# Patient Record
Sex: FEMALE | Race: WHITE | NOT HISPANIC OR LATINO | Employment: OTHER | URBAN - METROPOLITAN AREA
[De-identification: names, ages, dates, MRNs, and addresses within clinical notes are randomized per-mention and may not be internally consistent; named-entity substitution may affect disease eponyms.]

---

## 2018-04-09 LAB — INR PPP: 2.6 (ref 2–3.5)

## 2018-04-23 ENCOUNTER — ANTICOAGULATION VISIT (OUTPATIENT)
Dept: MEDICAL GROUP | Facility: PHYSICIAN GROUP | Age: 83
End: 2018-04-23
Payer: MEDICARE

## 2018-04-23 ENCOUNTER — TELEPHONE (OUTPATIENT)
Dept: VASCULAR LAB | Facility: MEDICAL CENTER | Age: 83
End: 2018-04-23

## 2018-04-23 DIAGNOSIS — Z79.01 CHRONIC ANTICOAGULATION: ICD-10-CM

## 2018-04-23 LAB — INR PPP: 1.5 (ref 2–3.5)

## 2018-04-23 PROCEDURE — 99211 OFF/OP EST MAY X REQ PHY/QHP: CPT | Performed by: FAMILY MEDICINE

## 2018-04-23 PROCEDURE — 85610 PROTHROMBIN TIME: CPT | Performed by: FAMILY MEDICINE

## 2018-04-23 RX ORDER — WARFARIN SODIUM 4 MG/1
4-8 TABLET ORAL DAILY
Qty: 180 TAB | Refills: 1 | Status: SHIPPED | OUTPATIENT
Start: 2018-04-23 | End: 2018-05-24 | Stop reason: SDUPTHER

## 2018-04-23 NOTE — PROGRESS NOTES
Anticoagulation Summary  As of 4/23/2018    INR goal:   2.0-3.0   TTR:   --   Today's INR:   1.5!   Maintenance plan:   4 mg (4 mg x 1) every day   Weekly total:   28 mg   Plan last modified:   Harrison Adames, PharmD (4/23/2018)   Next INR check:   4/26/2018   Target end date:       Indications    Acute thromboembolism of deep veins of lower extremity (CMS-HCC) [I82.409]  Pulmonary embolism (CMS-HCC) [I26.99]  Chronic anticoagulation [Z79.01]             Anticoagulation Episode Summary     INR check location:   [facility]    Preferred lab:       Send INR reminders to:       Comments:         Anticoagulation Care Providers     Provider Role Specialty Phone number    [facility] Responsible  [phone]    Rambo Shah M.D. Responsible Family Medicine [phone]        Anticoagulation Patient Findings      HPI:  Nancy King referred to the [facility] for warfarin management. They have been with us in the past. They have a PMH of a PE and DVT. Note from Dr. Bloch on 7/15.  They have been managed by oncology. She has had about 2-3 episodes of PEs. She and her  have a Alere HM that they bought and will continue to use it and report the INR to us.      Pt gave verbal consent  to leave a VM with detailed medical information regarding INR and dose of warfarin.    Pt education done (s/s of bleeding, when to f/u with clinic vs ER, foods with vitamin K, etc)     Assessment:  INR  sub-therapeutic.      She does not want to use lovenox at this time.     They would be a good candidate for PSM at some point, but I would like to work with them first on the phone for a few weeks first.     Pt tolerating medication well, no s/s of bleeding.     Med rec done with pt, they are also on fish oil and a SSRI, both will increase the bleeding risk. The risks discussed with the patient.     Plan   2 tabs today then continue weekly warfarin dose as noted      Follow up:  Follow up appointment in 3 days        Harrison Adames, PharmD

## 2018-04-24 NOTE — TELEPHONE ENCOUNTER
Initial anticoag clinic note reviewed.  Pending further recs from pcp or heme we will continue with indefinite anticoag for recurrent DVT/PE.  Patient also had IVC filter in past - likely remains indwelling based on previous documentation    Michael Bloch, MD  Anticoagulation Clinic    Cc:      Dr. Alyssa Neil

## 2018-04-25 ENCOUNTER — ANTICOAGULATION MONITORING (OUTPATIENT)
Dept: VASCULAR LAB | Facility: MEDICAL CENTER | Age: 83
End: 2018-04-25

## 2018-04-25 DIAGNOSIS — Z79.01 CHRONIC ANTICOAGULATION: ICD-10-CM

## 2018-04-25 LAB — INR PPP: 2.3 (ref 2–3.5)

## 2018-04-25 NOTE — PROGRESS NOTES
Anticoagulation Summary  As of 4/25/2018    INR goal:   2.0-3.0   TTR:   --   Today's INR:   2.3   Warfarin maintenance plan:   4 mg (4 mg x 1) every day   Weekly warfarin total:   28 mg   Plan last modified:   Harrison Adames, PharmD (4/23/2018)   Next INR check:   5/2/2018   Target end date:   Indefinite    Indications    Acute thromboembolism of deep veins of lower extremity (CMS-[facility]) [I82.409]  Pulmonary embolism (CMS-[facility]) [I26.99]  Chronic anticoagulation [Z79.01]             Anticoagulation Episode Summary     INR check location:   [facility]    Preferred lab:       Send INR reminders to:       Comments:         Anticoagulation Care Providers     Provider Role Specialty Phone number    [facility] Responsible  [phone]    Rambo Shah M.D. Responsible [facility] [phone]        Anticoagulation Patient Findings      Spoke with pt's .  INR is therapeutic.   However pt missed her dose yesterday.   Pt denies any unusual s/s of bleeding, bruising, clotting or any changes to diet or medications. Denies any etoh, cranberries, supplements, or illness.   Pt verifies warfarin weekly dosing.     Will have pt take a boost dose of 6mg x1 today and then resume her normal dose of 4mg daily.     Repeat INR in 1 week(s).     Tete Smith, PharmD

## 2018-05-18 LAB — INR PPP: 3.2 (ref 2–3.5)

## 2018-05-21 ENCOUNTER — ANTICOAGULATION MONITORING (OUTPATIENT)
Dept: VASCULAR LAB | Facility: MEDICAL CENTER | Age: 83
End: 2018-05-21

## 2018-05-21 DIAGNOSIS — Z79.01 CHRONIC ANTICOAGULATION: ICD-10-CM

## 2018-05-24 ENCOUNTER — OFFICE VISIT (OUTPATIENT)
Dept: CARDIOLOGY | Facility: MEDICAL CENTER | Age: 83
End: 2018-05-24
Payer: MEDICARE

## 2018-05-24 VITALS
WEIGHT: 184 LBS | BODY MASS INDEX: 30.66 KG/M2 | DIASTOLIC BLOOD PRESSURE: 78 MMHG | HEART RATE: 96 BPM | OXYGEN SATURATION: 97 % | HEIGHT: 65 IN | SYSTOLIC BLOOD PRESSURE: 152 MMHG

## 2018-05-24 DIAGNOSIS — I10 BENIGN ESSENTIAL HTN: Chronic | ICD-10-CM

## 2018-05-24 DIAGNOSIS — Z79.01 CHRONIC ANTICOAGULATION: Chronic | ICD-10-CM

## 2018-05-24 DIAGNOSIS — I82.409 ACUTE THROMBOEMBOLISM OF DEEP VEINS OF LOWER EXTREMITY, UNSPECIFIED LATERALITY (HCC): Chronic | ICD-10-CM

## 2018-05-24 DIAGNOSIS — R07.89 OTHER CHEST PAIN: ICD-10-CM

## 2018-05-24 DIAGNOSIS — I25.10 CORONARY ARTERY DISEASE INVOLVING NATIVE CORONARY ARTERY OF NATIVE HEART WITHOUT ANGINA PECTORIS: Chronic | ICD-10-CM

## 2018-05-24 DIAGNOSIS — E78.00 PURE HYPERCHOLESTEROLEMIA: Chronic | ICD-10-CM

## 2018-05-24 DIAGNOSIS — I60.9 SUBARACHNOID HEMORRHAGE (HCC): ICD-10-CM

## 2018-05-24 LAB — EKG IMPRESSION: NORMAL

## 2018-05-24 PROCEDURE — 99204 OFFICE O/P NEW MOD 45 MIN: CPT | Performed by: INTERNAL MEDICINE

## 2018-05-24 PROCEDURE — 93000 ELECTROCARDIOGRAM COMPLETE: CPT | Performed by: INTERNAL MEDICINE

## 2018-05-24 RX ORDER — NITROGLYCERIN 0.4 MG/1
0.4 TABLET SUBLINGUAL PRN
Qty: 25 TAB | Refills: 3 | Status: SHIPPED | OUTPATIENT
Start: 2018-05-24 | End: 2019-10-07 | Stop reason: SDUPTHER

## 2018-05-24 RX ORDER — AZITHROMYCIN 250 MG/1
TABLET, FILM COATED ORAL
COMMUNITY
Start: 2018-04-29 | End: 2018-05-24

## 2018-05-24 RX ORDER — SIMVASTATIN 40 MG
40 TABLET ORAL EVERY MORNING
Qty: 90 TAB | Refills: 3 | Status: SHIPPED | OUTPATIENT
Start: 2018-05-24 | End: 2019-02-23 | Stop reason: SDUPTHER

## 2018-05-24 RX ORDER — VALSARTAN 80 MG/1
80 TABLET ORAL DAILY
Qty: 90 TAB | Refills: 3 | Status: SHIPPED | OUTPATIENT
Start: 2018-05-24 | End: 2018-08-20 | Stop reason: RX

## 2018-05-24 RX ORDER — WARFARIN SODIUM 4 MG/1
4-8 TABLET ORAL DAILY
Qty: 180 TAB | Refills: 3 | Status: SHIPPED | OUTPATIENT
Start: 2018-05-24 | End: 2020-09-08

## 2018-05-24 RX ORDER — VALSARTAN 80 MG/1
80 TABLET ORAL DAILY
COMMUNITY
End: 2018-05-24 | Stop reason: SDUPTHER

## 2018-05-24 ASSESSMENT — ENCOUNTER SYMPTOMS
ORTHOPNEA: 0
NERVOUS/ANXIOUS: 1
PND: 0
SHORTNESS OF BREATH: 1
FEVER: 0
INSOMNIA: 0
MYALGIAS: 0
BLURRED VISION: 0
PALPITATIONS: 0
CHILLS: 0
BACK PAIN: 1
ABDOMINAL PAIN: 0
LOSS OF CONSCIOUSNESS: 0
HEARTBURN: 1
DIZZINESS: 0

## 2018-05-24 NOTE — PROGRESS NOTES
"Chief Complaint   Patient presents with   • Chest Pain     x2 weeks scale:7       Subjective:   Nancy King is a 82 y.o. female who presents today as new patient establishment for chest pain with coronary artery disease.    Ms. Moffett is a 82 year old female with history of coronary artery disease, hypertension and hyperlipidemia, deep venous thrombosis and.pulmonary embolism on chronic anticoagulation therapy. She was well until 2 weeks ago when she began to experience chest pain. She describes her chest pain to be moderate to severe chest pressure sensation of her mid chest with radiation up her shoulder and down her bilateral arm, lasting up one to five minutes. She admits to associated nausea. She denies associated shortness of breath, palpitations, diaphoresis and vomiting. Her pain occurs couple of hours after she falls asleep and is alleviated by use of nitroglycerine.     Past Medical History:   Diagnosis Date   • Acute venous embolism and thrombosis of unspecified deep vessels of lower extremity    • Angina pectoris 5/11/2012    \"with stress\"   • Benign essential HTN 5/11/2012   • CAD (coronary artery disease) 5/11/2012   • Cancer (HCC)     right breast   • Chronic anticoagulation 5/11/2012   • Heart burn    • Hyperlipidemia 5/11/2012   • Hyperlipoproteinemia 5/11/2012   • Indigestion    • Obesity 5/11/2012   • Other specified disorder of intestines     diarrhea   • Pulmonary embolism (HCC)    • Unspecified cataract     removed bilat   • Unspecified disorder of thyroid    • Unspecified urinary incontinence      Past Surgical History:   Procedure Laterality Date   • MASTECTOMY  1/12/2015    Performed by Dwight Birch M.D. at SURGERY [facility]   • NODE BIOPSY SENTINEL  1/12/2015    Performed by Dwight Birch M.D. at SURGERY [facility]   • AXILLARY NODE DISSECTION  1/12/2015    Performed by Dwight Birch M.D. at SURGERY [facility]   • APPENDECTOMY     • CHOLECYSTECTOMY     • " HYSTERECTOMY, TOTAL ABDOMINAL     • OTHER      Bladder   • TONSILLECTOMY AND ADENOIDECTOMY       Family History   Problem Relation Age of Onset   • Heart Disease Mother    • Heart Disease Father      Social History     Social History   • Marital status:      Spouse name: N/A   • Number of children: N/A   • Years of education: N/A     Occupational History   • Not on file.     Social History Main Topics   • Smoking status: Never Smoker   • Smokeless tobacco: Never Used   • Alcohol use No   • Drug use: No   • Sexual activity: Not on file     Other Topics Concern   • Not on file     Social History Narrative   • No narrative on file     Allergies   Allergen Reactions   • Sulfa Drugs      Swelling   • Levaquin      Rash     Medications reviewed.    Outpatient Encounter Prescriptions as of 5/24/2018   Medication Sig Dispense Refill   • Capsaicin-Menthol (PAIN RELIEF EX) by Apply externally route.     • valsartan (DIOVAN) 80 MG Tab Take 80 mg by mouth every day.     • SITagliptin (JANUVIA) 100 MG Tab Take 100 mg by mouth every day.     • warfarin (COUMADIN) 4 MG Tab Take 1-2 Tabs by mouth every day. 180 Tab 1   • levothyroxine (SYNTHROID) 50 MCG TABS Take 50 mcg by mouth every morning before breakfast.     • simvastatin (ZOCOR) 40 MG TABS Take 40 mg by mouth every morning.     • Cholecalciferol (VITAMIN D PO) Take 1 Tab by mouth every morning.     • ascorbic acid (ASCORBIC ACID) 500 MG TABS Take 500 mg by mouth every morning.     • [DISCONTINUED] azithromycin (ZITHROMAX) 250 MG Tab      • [DISCONTINUED] enoxaparin (LOVENOX) 100 MG/ML SOLN inj Inject 80 mg as instructed every 12 hours. 7 Syringe 0   • [DISCONTINUED] lisinopril (PRINIVIL) 20 MG TABS Take 20 mg by mouth every day.     • [DISCONTINUED] metoprolol SR (TOPROL XL) 25 MG TB24 Take 25 mg by mouth every day.     • [DISCONTINUED] magnesium oxide (MAG-OX) 400 MG TABS Take 400 mg by mouth every morning.     • [DISCONTINUED] Coenzyme Q10 200 MG CAPS Take 200 mg by  "mouth every day.     • [DISCONTINUED] Omega-3 Fatty Acids (OMEGA 3 PO) Take 1 Cap by mouth every morning.     • [DISCONTINUED] Green Tea, Camillia sinensis, (GREEN TEA EXTRACT) 150 MG CAPS Take 1 Cap by mouth every morning.     • [DISCONTINUED] hydrochlorothiazide (MICROZIDE) 12.5 MG capsule Take 1 Cap by mouth every day. 90 Cap 1   • [DISCONTINUED] escitalopram (LEXAPRO) 10 MG TABS Take 10 mg by mouth every morning.     • [DISCONTINUED] warfarin (COUMADIN) 3 MG TABS Take 3 mg by mouth every 48 hours. Alternates w/4 mg       No facility-administered encounter medications on file as of 5/24/2018.      Review of Systems   Constitutional: Negative for chills and fever.   HENT: Positive for hearing loss. Negative for congestion.    Eyes: Negative for blurred vision.   Respiratory: Positive for shortness of breath.    Cardiovascular: Positive for chest pain and leg swelling. Negative for palpitations, orthopnea and PND.   Gastrointestinal: Positive for heartburn. Negative for abdominal pain.   Genitourinary: Negative for dysuria.   Musculoskeletal: Positive for back pain and joint pain. Negative for myalgias.   Skin: Positive for itching. Negative for rash.   Neurological: Negative for dizziness and loss of consciousness.   Psychiatric/Behavioral: The patient is nervous/anxious. The patient does not have insomnia.         Objective:   /78   Pulse 96   Ht 1.651 m (5' 5\")   Wt 83.5 kg (184 lb)   SpO2 97%   BMI 30.62 kg/m²     Physical Exam   Constitutional: She is oriented to person, place, and time. She appears well-developed and well-nourished.   HENT:   Head: Normocephalic and atraumatic.   Eyes: Conjunctivae are normal.   Neck: Neck supple.   Cardiovascular: Normal rate and regular rhythm.    Pulmonary/Chest: Effort normal and breath sounds normal.   Abdominal: Soft. Bowel sounds are normal.   Musculoskeletal: Normal range of motion. She exhibits edema.   Neurological: She is alert and oriented to person, " place, and time.   Skin: Skin is warm and dry.   Psychiatric: She has a normal mood and affect.     CARDIAC STUDIES/PROCEDURES:    CTA OF CHEST (01/30/15)  1.  Extensive pulmonary thromboembolism involving majority of right pulmonary arteries and significant portions of left pulmonary arteries  2.  No other significant finding  3.  Right breast postoperative changes  4.  Aortic atherosclerotic plaque  5.  Infiltration of liver    ECHOCARDIOGRAM CONCLUSIONS (02/01/15)  No prior study is available for comparison.   Normal left ventricular size and function. Grade I diastolic   dysfunction - mitral inflow E/A is <1.0. Left ventricular ejection   fraction is 60% to 65%. Mild concentric left ventricular hypertrophy.  No significant valve abnormalities.   Right ventricular systolic pressure is estimated to be 45-50 mmHg.  Normal pericardium without effusion.    EKG was ordered for chest pain, performed on (05/24/18) and reviewed: EKG shows sinus rhythm with diffuse ST abnormalities.    Laboratory results of (02/05/15) were reviewed. Cholesterol profile of 129/144/34/66 noted.    LOWER EXTREMITY VENOUS ULTRASOUND (01/30/15)  1.  Normal right lower extremity superficial and deep venous examination.   2.  Left lower extremity deep and superficial venous thrombosis.   3.  Left popliteal cyst.    Assessment:     1. Other chest pain  EKG   2. Coronary artery disease involving native coronary artery of native heart without angina pectoris     3. Benign essential HTN     4. Pure hypercholesterolemia     5. Acute thromboembolism of deep veins of lower extremity, unspecified laterality (HCC)     6. Chronic anticoagulation     7. Subarachnoid hemorrhage (HCC)       Medical Decision Making:  Today's Assessment / Status / Plan:     1. Chest pain with coronary artery disease of left anterior descending artery diagnosed in 1984 [place], [place]: She is experiencing chest pain as described above. We will perform a MPI study and an  echocardiogram and follow up with her. We will start beta-blockade therapy with metoprolol 25 mg BID and she will try PPI as well.   2. Hypertension: Blood pressure has been high. She has been off of her Diovan. We will restart her medication.  3. Hyperlipidemia: She is doing well on statin therapy without myalgia symptoms. We will repeat labs including fasting lipid profile.  4. Pulmonary embolism with deep venous thrombosis on chronic anticoagulation therapy (warfarin): She is clinically doing well without recurrence or chest pain or shortness of breath. She has chronic edema of her left lower extremity.    We will follow up after her tests to reassess her symptoms.

## 2018-06-11 ENCOUNTER — HOSPITAL ENCOUNTER (OUTPATIENT)
Dept: CARDIOLOGY | Facility: MEDICAL CENTER | Age: 83
End: 2018-06-11
Attending: INTERNAL MEDICINE
Payer: MEDICARE

## 2018-06-11 DIAGNOSIS — R07.89 OTHER CHEST PAIN: ICD-10-CM

## 2018-06-11 PROCEDURE — 93306 TTE W/DOPPLER COMPLETE: CPT | Mod: 26 | Performed by: INTERNAL MEDICINE

## 2018-06-11 PROCEDURE — 93306 TTE W/DOPPLER COMPLETE: CPT

## 2018-06-12 LAB
LV EJECT FRACT  99904: 60
LV EJECT FRACT MOD 2C 99903: 69.39
LV EJECT FRACT MOD 4C 99902: 58.91
LV EJECT FRACT MOD BP 99901: 61.96

## 2018-06-13 ENCOUNTER — TELEPHONE (OUTPATIENT)
Dept: CARDIOLOGY | Facility: MEDICAL CENTER | Age: 83
End: 2018-06-13

## 2018-06-13 NOTE — TELEPHONE ENCOUNTER
----- Message from Dave Obrien M.D. sent at 6/12/2018  8:26 AM PDT -----  Please call with unremarkable study, echocardiogram.    Thanks.  SARA

## 2018-06-14 ENCOUNTER — ANTICOAGULATION MONITORING (OUTPATIENT)
Dept: VASCULAR LAB | Facility: MEDICAL CENTER | Age: 83
End: 2018-06-14

## 2018-06-14 DIAGNOSIS — Z79.01 CHRONIC ANTICOAGULATION: ICD-10-CM

## 2018-06-14 DIAGNOSIS — I82.409 ACUTE THROMBOEMBOLISM OF DEEP VEINS OF LOWER EXTREMITY, UNSPECIFIED LATERALITY (HCC): ICD-10-CM

## 2018-06-14 NOTE — PROGRESS NOTES
Anticoagulation clinic    Reminder voice message for patient regarding getting INR done ASAP for anticoagulation clinic.     Harrison Adames, PharmD

## 2018-06-15 ENCOUNTER — HOSPITAL ENCOUNTER (OUTPATIENT)
Dept: LAB | Facility: MEDICAL CENTER | Age: 83
End: 2018-06-15
Attending: INTERNAL MEDICINE
Payer: MEDICARE

## 2018-06-15 ENCOUNTER — HOSPITAL ENCOUNTER (OUTPATIENT)
Dept: RADIOLOGY | Facility: MEDICAL CENTER | Age: 83
End: 2018-06-15
Attending: INTERNAL MEDICINE
Payer: MEDICARE

## 2018-06-15 DIAGNOSIS — R07.89 OTHER CHEST PAIN: ICD-10-CM

## 2018-06-15 DIAGNOSIS — E78.00 PURE HYPERCHOLESTEROLEMIA: Chronic | ICD-10-CM

## 2018-06-15 LAB
ALBUMIN SERPL BCP-MCNC: 4.7 G/DL (ref 3.2–4.9)
ALBUMIN/GLOB SERPL: 1.7 G/DL
ALP SERPL-CCNC: 61 U/L (ref 30–99)
ALT SERPL-CCNC: 20 U/L (ref 2–50)
ANION GAP SERPL CALC-SCNC: 10 MMOL/L (ref 0–11.9)
AST SERPL-CCNC: 23 U/L (ref 12–45)
BILIRUB SERPL-MCNC: 0.8 MG/DL (ref 0.1–1.5)
BUN SERPL-MCNC: 21 MG/DL (ref 8–22)
CALCIUM SERPL-MCNC: 9.6 MG/DL (ref 8.5–10.5)
CHLORIDE SERPL-SCNC: 105 MMOL/L (ref 96–112)
CHOLEST SERPL-MCNC: 172 MG/DL (ref 100–199)
CO2 SERPL-SCNC: 24 MMOL/L (ref 20–33)
CREAT SERPL-MCNC: 0.84 MG/DL (ref 0.5–1.4)
GLOBULIN SER CALC-MCNC: 2.8 G/DL (ref 1.9–3.5)
GLUCOSE SERPL-MCNC: 162 MG/DL (ref 65–99)
HDLC SERPL-MCNC: 63 MG/DL
LDLC SERPL CALC-MCNC: 80 MG/DL
POTASSIUM SERPL-SCNC: 4.1 MMOL/L (ref 3.6–5.5)
PROT SERPL-MCNC: 7.5 G/DL (ref 6–8.2)
SODIUM SERPL-SCNC: 139 MMOL/L (ref 135–145)
TRIGL SERPL-MCNC: 147 MG/DL (ref 0–149)

## 2018-06-15 PROCEDURE — 80053 COMPREHEN METABOLIC PANEL: CPT

## 2018-06-15 PROCEDURE — 700111 HCHG RX REV CODE 636 W/ 250 OVERRIDE (IP)

## 2018-06-15 PROCEDURE — 36415 COLL VENOUS BLD VENIPUNCTURE: CPT

## 2018-06-15 PROCEDURE — A9502 TC99M TETROFOSMIN: HCPCS

## 2018-06-15 PROCEDURE — 80061 LIPID PANEL: CPT

## 2018-06-15 RX ORDER — REGADENOSON 0.08 MG/ML
INJECTION, SOLUTION INTRAVENOUS
Status: COMPLETED
Start: 2018-06-15 | End: 2018-06-15

## 2018-06-15 RX ADMIN — REGADENOSON 0.4 MG: 0.08 INJECTION, SOLUTION INTRAVENOUS at 11:55

## 2018-06-18 ENCOUNTER — TELEPHONE (OUTPATIENT)
Dept: CARDIOLOGY | Facility: MEDICAL CENTER | Age: 83
End: 2018-06-18

## 2018-06-18 NOTE — TELEPHONE ENCOUNTER
----- Message from Dave Obrien M.D. sent at 6/18/2018  7:44 AM PDT -----  Please call with unremarkable study, myocardial perfusion imaging study showing small ischemia only. This can be treated medically. Please make a follow up with me if she wishes to discuss cardiac catheterization..    Thanks.  SARA    --------------------------------------------------------------------------------        Pt has a scheduled follow up 6/29 w/ SARA. S/w pt at home number and informed her of testing results, including Dr. Obrien's interpretation, and lab work. Pt was informed to to EMS or go to ER if chest pain persists.     Pt admits that just prior to her blood work she had a can of soda and 2 cookies because she did the stress test prior to her blood work appt. She notes this to be the reason her blood sugar was high.

## 2018-06-29 ENCOUNTER — TELEPHONE (OUTPATIENT)
Dept: CARDIOLOGY | Facility: MEDICAL CENTER | Age: 83
End: 2018-06-29

## 2018-06-29 ENCOUNTER — OFFICE VISIT (OUTPATIENT)
Dept: CARDIOLOGY | Facility: MEDICAL CENTER | Age: 83
End: 2018-06-29
Payer: MEDICARE

## 2018-06-29 VITALS
HEIGHT: 65 IN | OXYGEN SATURATION: 96 % | SYSTOLIC BLOOD PRESSURE: 130 MMHG | HEART RATE: 76 BPM | DIASTOLIC BLOOD PRESSURE: 60 MMHG | BODY MASS INDEX: 30.49 KG/M2 | WEIGHT: 183 LBS

## 2018-06-29 DIAGNOSIS — I25.10 CORONARY ARTERY DISEASE INVOLVING NATIVE CORONARY ARTERY OF NATIVE HEART WITHOUT ANGINA PECTORIS: Chronic | ICD-10-CM

## 2018-06-29 DIAGNOSIS — I10 BENIGN ESSENTIAL HTN: Chronic | ICD-10-CM

## 2018-06-29 DIAGNOSIS — R12 HEART BURN: ICD-10-CM

## 2018-06-29 DIAGNOSIS — E78.49 OTHER HYPERLIPIDEMIA: Chronic | ICD-10-CM

## 2018-06-29 PROCEDURE — 99213 OFFICE O/P EST LOW 20 MIN: CPT | Performed by: INTERNAL MEDICINE

## 2018-06-29 ASSESSMENT — ENCOUNTER SYMPTOMS
LOSS OF CONSCIOUSNESS: 0
HEARTBURN: 1
DIZZINESS: 0
CHILLS: 0
PND: 0
PALPITATIONS: 0
ABDOMINAL PAIN: 0
NERVOUS/ANXIOUS: 1
ORTHOPNEA: 0
BLURRED VISION: 0
MYALGIAS: 0
FEVER: 0
SHORTNESS OF BREATH: 0
WEAKNESS: 0
INSOMNIA: 0

## 2018-06-29 NOTE — PROGRESS NOTES
"Chief Complaint   Patient presents with   • Hypertension     Follow up        Subjective:   Nancy King is a 82 y.o. female who presents today for follow up of chest pain and study result review.    Since the patient's last visit on 05/24/18, she has had increased with mild to sever chest pain with radiation of her left arm improved with nitroglycerin use. She denies shortness of breath, palpitations, nausea/vomiting or diaphoresis. She had anxiety starting at the time of her husbands TAVR.    Past Medical History:   Diagnosis Date   • Acute venous embolism and thrombosis of unspecified deep vessels of lower extremity    • Angina pectoris 5/11/2012    \"with stress\"   • Benign essential HTN 5/11/2012   • CAD (coronary artery disease) 5/11/2012   • Cancer (HCC)     right breast   • Chronic anticoagulation 5/11/2012   • Heart burn    • Hyperlipidemia 5/11/2012   • Hyperlipoproteinemia 5/11/2012   • Indigestion    • Obesity 5/11/2012   • Other specified disorder of intestines     diarrhea   • Pulmonary embolism (HCC)    • Unspecified cataract     removed bilat   • Unspecified disorder of thyroid    • Unspecified urinary incontinence      Past Surgical History:   Procedure Laterality Date   • MASTECTOMY  1/12/2015    Performed by Dwight Birch M.D. at SURGERY [facility]   • NODE BIOPSY SENTINEL  1/12/2015    Performed by Dwight Birch M.D. at SURGERY [facility]   • AXILLARY NODE DISSECTION  1/12/2015    Performed by Dwight [facility]ch M.D. at [facility]   • APPENDECTOMY     • CHOLECYSTECTOMY     • HYSTERECTOMY, TOTAL ABDOMINAL     • OTHER      Bladder   • TONSILLECTOMY AND ADENOIDECTOMY       Family History   Problem Relation Age of Onset   • Heart Disease Mother    • Heart Disease Father      Social History     Social History   • Marital status:      Spouse name: N/A   • Number of children: N/A   • Years of education: N/A     Occupational History   • Not on file.     Social " History Main Topics   • Smoking status: Never Smoker   • Smokeless tobacco: Never Used   • Alcohol use No   • Drug use: No   • Sexual activity: Not on file     Other Topics Concern   • Not on file     Social History Narrative   • No narrative on file     Allergies   Allergen Reactions   • Sulfa Drugs      Swelling   • Levaquin      Rash     Medications reviewed.    Outpatient Encounter Prescriptions as of 6/29/2018   Medication Sig Dispense Refill   • Capsaicin-Menthol (PAIN RELIEF EX) by Apply externally route.     • SITagliptin (JANUVIA) 100 MG Tab Take 100 mg by mouth every day.     • warfarin (COUMADIN) 4 MG Tab Take 1-2 Tabs by mouth every day. 180 Tab 3   • valsartan (DIOVAN) 80 MG Tab Take 1 Tab by mouth every day. 90 Tab 3   • simvastatin (ZOCOR) 40 MG Tab Take 1 Tab by mouth every morning. 90 Tab 3   • metoprolol (LOPRESSOR) 25 MG Tab Take 1 Tab by mouth 2 times a day. (Patient taking differently: Take 25 mg by mouth every day.) 60 Tab 11   • nitroglycerin (NITROSTAT) 0.4 MG SL Tab Place 1 Tab under tongue as needed for Chest Pain. 25 Tab 3   • levothyroxine (SYNTHROID) 50 MCG TABS Take 50 mcg by mouth every morning before breakfast.     • Cholecalciferol (VITAMIN D PO) Take 1 Tab by mouth every morning.     • ascorbic acid (ASCORBIC ACID) 500 MG TABS Take 500 mg by mouth every morning.       No facility-administered encounter medications on file as of 6/29/2018.      Review of Systems   Constitutional: Negative for chills, fever and malaise/fatigue.   HENT: Negative for congestion.    Eyes: Negative for blurred vision.   Respiratory: Negative for shortness of breath.    Cardiovascular: Positive for chest pain. Negative for palpitations, orthopnea, leg swelling and PND.   Gastrointestinal: Positive for heartburn. Negative for abdominal pain.   Genitourinary: Negative for dysuria.   Musculoskeletal: Negative for joint pain and myalgias.   Skin: Negative for rash.   Neurological: Negative for dizziness, loss  "of consciousness and weakness.   Psychiatric/Behavioral: The patient is nervous/anxious. The patient does not have insomnia.         Objective:   /60   Pulse 76   Ht 1.651 m (5' 5\")   Wt 83 kg (183 lb)   SpO2 96%   BMI 30.45 kg/m²     Physical Exam   Constitutional: She is oriented to person, place, and time. She appears well-developed and well-nourished.   HENT:   Head: Normocephalic and atraumatic.   Eyes: Conjunctivae are normal.   Neck: Normal range of motion. Neck supple.   Cardiovascular: Normal rate and regular rhythm.    No murmur heard.  Pulmonary/Chest: Effort normal and breath sounds normal.   Abdominal: Soft. Bowel sounds are normal.   Musculoskeletal: Normal range of motion. She exhibits no edema.   Neurological: She is alert and oriented to person, place, and time.   Skin: Skin is warm and dry.   Psychiatric: She has a normal mood and affect.     CARDIAC STUDIES/PROCEDURES:     CTA OF CHEST (01/30/15)  1.  Extensive pulmonary thromboembolism involving majority of right pulmonary arteries and significant portions of left pulmonary arteries  2.  No other significant finding  3.  Right breast postoperative changes  4.  Aortic atherosclerotic plaque  5.  Infiltration of liver    ECHOCARDIOGRAM CONCLUSIONS (06/11/18)  Prior echo done 02/01/15. Compared to the report of the study done -   there has been no significant change.   Normal left ventricular systolic function.  Left ventricular ejection fraction is visually estimated to be 60%.  Grade I diastolic dysfunction.  Mild mitral regurgitation.  Mild tricuspid regurgitation.  Estimated right ventricular systolic pressure is 25 mmHg.  (study result reviewed)     ECHOCARDIOGRAM CONCLUSIONS (02/01/15)  No prior study is available for comparison.   Normal left ventricular size and function. Grade I diastolic   dysfunction - mitral inflow E/A is <1.0. Left ventricular ejection   fraction is 60% to 65%. Mild concentric left ventricular " hypertrophy.  No significant valve abnormalities.   Right ventricular systolic pressure is estimated to be 45-50 mmHg.  Normal pericardium without effusion.     EKG performed on (05/24/18) EKG shows sinus rhythm with diffuse ST abnormalities.     Laboratory results of (06/15/18) were reviewed. Cholesterol profile of 172/147/63/80 noted.  Laboratory results of (02/05/15) Cholesterol profile of 129/144/34/66 noted.     LOWER EXTREMITY VENOUS ULTRASOUND (01/30/15)  1.  Normal right lower extremity superficial and deep venous examination.   2.  Left lower extremity deep and superficial venous thrombosis.   3.  Left popliteal cyst.    MYOCARDIAL PERFUSION STUDY CONCLUSIONS (06/15/18)  Very small reversible defect is noted at the apical cap in the stress images, concerning for ischemia. Artifact cannot be ruled out.  Normal left ventricular wall motion, with EF of 75%.   ECG INTERPRETATION  Negative stress ECG for ischemia.  (study result reviewed)    Assessment:     1. Coronary artery disease involving native coronary artery of native heart without angina pectoris     2. Benign essential HTN     3. Other hyperlipidemia         Medical Decision Making:  Today's Assessment / Status / Plan:     1. Chest pain with coronary artery disease of left anterior descending artery diagnosed in 1984 [place], [place]: She is experiencing chest pain as described above. Her myocardial perfusion imaging study was minimally abnormal. We did discuss the options of performing cardiac catheterization, however, she does not wish to schedule this procedure at this time.  2. Hypertension: Blood pressure is well controlled.  3. Hyperlipidemia: She is doing well on statin therapy without myalgia symptoms. We will repeat labs including fasting lipid profile.  4. Pulmonary embolism with deep venous thrombosis on chronic anticoagulation therapy (warfarin): She is clinically doing well without recurrence or chest pain or shortness of breath. She has  chronic edema of her left lower extremity.  5. Heart burn: She will be referred to gastroenterology.     We will follow up in 10 month.    Dee Mcghee Ms.

## 2018-06-29 NOTE — LETTER
"     [facility]   [address]  [place] [address]  Phone: [phone]  Fax: [phone]              Nancy King  1935    Encounter Date: 6/29/2018    Dave Obrien M.D.          PROGRESS NOTE:  Chief Complaint   Patient presents with   • Hypertension     Follow up        Subjective:   Nancy King is a 82 y.o. female who presents today for follow up of chest pain and study result review.    Since the patient's last visit on 05/24/18, she has had increased with mild to sever chest pain with radiation of her left arm improved with nitroglycerin use. She denies shortness of breath, palpitations, nausea/vomiting or diaphoresis. She had anxiety starting at the time of her husbands TAVR.    Past Medical History:   Diagnosis Date   • Acute venous embolism and thrombosis of unspecified deep vessels of lower extremity    • Angina pectoris 5/11/2012    \"with stress\"   • Benign essential HTN 5/11/2012   • CAD (coronary artery disease) 5/11/2012   • Cancer (HCC)     right breast   • Chronic anticoagulation 5/11/2012   • Heart burn    • Hyperlipidemia 5/11/2012   • Hyperlipoproteinemia 5/11/2012   • Indigestion    • Obesity 5/11/2012   • Other specified disorder of intestines     diarrhea   • Pulmonary embolism (HCC)    • Unspecified cataract     removed bilat   • Unspecified disorder of thyroid    • Unspecified urinary incontinence      Past Surgical History:   Procedure Laterality Date   • MASTECTOMY  1/12/2015    Performed by Dwight Birch M.D. at [facility]ERY [facility]   • NODE BIOPSY SENTINEL  1/12/2015    Performed by Dwight Birch M.D. at SUR[facility]ERY [facility]   • AXILLARY NODE DISSECTION  1/12/2015    Performed by Dwight Birch M.D. at SUR[facility]ERY [facility]   • APPENDECTOMY     • CHOLECYSTECTOMY     • HYSTERECTOMY, TOTAL ABDOMINAL     • OTHER      Bladder   • TONSILLECTOMY AND ADENOIDECTOMY       Family History   Problem Relation Age " of Onset   • Heart Disease Mother    • Heart Disease Father      Social History     Social History   • Marital status:      Spouse name: N/A   • Number of children: N/A   • Years of education: N/A     Occupational History   • Not on file.     Social History Main Topics   • Smoking status: Never Smoker   • Smokeless tobacco: Never Used   • Alcohol use No   • Drug use: No   • Sexual activity: Not on file     Other Topics Concern   • Not on file     Social History Narrative   • No narrative on file     Allergies   Allergen Reactions   • Sulfa Drugs      Swelling   • Levaquin      Rash     Medications reviewed.    Outpatient Encounter Prescriptions as of 6/29/2018   Medication Sig Dispense Refill   • Capsaicin-Menthol (PAIN RELIEF EX) by Apply externally route.     • SITagliptin (JANUVIA) 100 MG Tab Take 100 mg by mouth every day.     • warfarin (COUMADIN) 4 MG Tab Take 1-2 Tabs by mouth every day. 180 Tab 3   • valsartan (DIOVAN) 80 MG Tab Take 1 Tab by mouth every day. 90 Tab 3   • simvastatin (ZOCOR) 40 MG Tab Take 1 Tab by mouth every morning. 90 Tab 3   • metoprolol (LOPRESSOR) 25 MG Tab Take 1 Tab by mouth 2 times a day. (Patient taking differently: Take 25 mg by mouth every day.) 60 Tab 11   • nitroglycerin (NITROSTAT) 0.4 MG SL Tab Place 1 Tab under tongue as needed for Chest Pain. 25 Tab 3   • levothyroxine (SYNTHROID) 50 MCG TABS Take 50 mcg by mouth every morning before breakfast.     • Cholecalciferol (VITAMIN D PO) Take 1 Tab by mouth every morning.     • ascorbic acid (ASCORBIC ACID) 500 MG TABS Take 500 mg by mouth every morning.       No facility-administered encounter medications on file as of 6/29/2018.      Review of Systems   Constitutional: Negative for chills, fever and malaise/fatigue.   HENT: Negative for congestion.    Eyes: Negative for blurred vision.   Respiratory: Negative for shortness of breath.    Cardiovascular: Positive for chest pain. Negative for palpitations, orthopnea, leg  "swelling and PND.   Gastrointestinal: Positive for heartburn. Negative for abdominal pain.   Genitourinary: Negative for dysuria.   Musculoskeletal: Negative for joint pain and myalgias.   Skin: Negative for rash.   Neurological: Negative for dizziness, loss of consciousness and weakness.   Psychiatric/Behavioral: The patient is nervous/anxious. The patient does not have insomnia.         Objective:   /60   Pulse 76   Ht 1.651 m (5' 5\")   Wt 83 kg (183 lb)   SpO2 96%   BMI 30.45 kg/m²      Physical Exam   Constitutional: She is oriented to person, place, and time. She appears well-developed and well-nourished.   HENT:   Head: Normocephalic and atraumatic.   Eyes: Conjunctivae are normal.   Neck: Normal range of motion. Neck supple.   Cardiovascular: Normal rate and regular rhythm.    No murmur heard.  Pulmonary/Chest: Effort normal and breath sounds normal.   Abdominal: Soft. Bowel sounds are normal.   Musculoskeletal: Normal range of motion. She exhibits no edema.   Neurological: She is alert and oriented to person, place, and time.   Skin: Skin is warm and dry.   Psychiatric: She has a normal mood and affect.     CARDIAC STUDIES/PROCEDURES:     CTA OF CHEST (01/30/15)  1.  Extensive pulmonary thromboembolism involving majority of right pulmonary arteries and significant portions of left pulmonary arteries  2.  No other significant finding  3.  Right breast postoperative changes  4.  Aortic atherosclerotic plaque  5.  Infiltration of liver    ECHOCARDIOGRAM CONCLUSIONS (06/11/18)  Prior echo done 02/01/15. Compared to the report of the study done -   there has been no significant change.   Normal left ventricular systolic function.  Left ventricular ejection fraction is visually estimated to be 60%.  Grade I diastolic dysfunction.  Mild mitral regurgitation.  Mild tricuspid regurgitation.  Estimated right ventricular systolic pressure is 25 mmHg.  (study result reviewed)     ECHOCARDIOGRAM CONCLUSIONS " (02/01/15)  No prior study is available for comparison.   Normal left ventricular size and function. Grade I diastolic   dysfunction - mitral inflow E/A is <1.0. Left ventricular ejection   fraction is 60% to 65%. Mild concentric left ventricular hypertrophy.  No significant valve abnormalities.   Right ventricular systolic pressure is estimated to be 45-50 mmHg.  Normal pericardium without effusion.     EKG performed on (05/24/18) EKG shows sinus rhythm with diffuse ST abnormalities.     Laboratory results of (06/15/18) were reviewed. Cholesterol profile of 172/147/63/80 noted.  Laboratory results of (02/05/15) Cholesterol profile of 129/144/34/66 noted.     LOWER EXTREMITY VENOUS ULTRASOUND (01/30/15)  1.  Normal right lower extremity superficial and deep venous examination.   2.  Left lower extremity deep and superficial venous thrombosis.   3.  Left popliteal cyst.    MYOCARDIAL PERFUSION STUDY CONCLUSIONS (06/15/18)  Very small reversible defect is noted at the apical cap in the stress images, concerning for ischemia. Artifact cannot be ruled out.  Normal left ventricular wall motion, with EF of 75%.   ECG INTERPRETATION  Negative stress ECG for ischemia.  (study result reviewed)    Assessment:     1. Coronary artery disease involving native coronary artery of native heart without angina pectoris     2. Benign essential HTN     3. Other hyperlipidemia         Medical Decision Making:  Today's Assessment / Status / Plan:     1. Chest pain with coronary artery disease of left anterior descending artery diagnosed in 1984 [place], [place]: She is experiencing chest pain as described above. Her myocardial perfusion imaging study was minimally abnormal. We did discuss the options of performing cardiac catheterization, however, she does not wish to schedule this procedure at this time.  2. Hypertension: Blood pressure is well controlled.  3. Hyperlipidemia: She is doing well on statin therapy without myalgia symptoms. We  will repeat labs including fasting lipid profile.  4. Pulmonary embolism with deep venous thrombosis on chronic anticoagulation therapy (warfarin): She is clinically doing well without recurrence or chest pain or shortness of breath. She has chronic edema of her left lower extremity.  5. Heart burn: She will be referred to gastroenterology.     We will follow up in 10 month.    CC Dee SaldanaP      No Recipients

## 2018-07-03 ENCOUNTER — TELEPHONE (OUTPATIENT)
Dept: CARDIOLOGY | Facility: MEDICAL CENTER | Age: 83
End: 2018-07-03

## 2018-07-03 NOTE — TELEPHONE ENCOUNTER
Message     The referral to Gastroenterology will be sent to [facility]. The contact number is [phone]

## 2018-07-09 ENCOUNTER — ANTICOAGULATION MONITORING (OUTPATIENT)
Dept: VASCULAR LAB | Facility: MEDICAL CENTER | Age: 83
End: 2018-07-09

## 2018-07-09 DIAGNOSIS — Z79.01 CHRONIC ANTICOAGULATION: ICD-10-CM

## 2018-07-09 DIAGNOSIS — I82.409 ACUTE THROMBOEMBOLISM OF DEEP VEINS OF LOWER EXTREMITY, UNSPECIFIED LATERALITY (HCC): ICD-10-CM

## 2018-07-31 ENCOUNTER — ANTICOAGULATION MONITORING (OUTPATIENT)
Dept: VASCULAR LAB | Facility: MEDICAL CENTER | Age: 83
End: 2018-07-31

## 2018-07-31 DIAGNOSIS — Z79.01 CHRONIC ANTICOAGULATION: ICD-10-CM

## 2018-08-20 ENCOUNTER — TELEPHONE (OUTPATIENT)
Dept: CARDIOLOGY | Facility: MEDICAL CENTER | Age: 83
End: 2018-08-20

## 2018-08-20 RX ORDER — LOSARTAN POTASSIUM 25 MG/1
25 TABLET ORAL DAILY
Qty: 90 TAB | Refills: 3 | Status: SHIPPED | OUTPATIENT
Start: 2018-08-20 | End: 2019-03-23 | Stop reason: SDUPTHER

## 2018-08-20 NOTE — TELEPHONE ENCOUNTER
change therapy   Received: 3 days ago      Call patient   Message Contents   Faiza Summers, Med Ass't  Sharmila Montemayor R.N.             Patient's valsartan (DIOVAN) 80 MG Tab is due for a refill and [facility] is requesting a change in therapy due to the recall, please send new RX for alternate therapy   Thank you        Called and s/w pts , Augustin, as pt was in the shower. He was aware of the valsartan recall as [facility] did contact them. Notified  that we would be prescribing an equivalent medication, losartan.  denies knowing of any medication allergies other than to sulfa drugs. Dicussed possible side effects or losartan and instructed pt to monitor blood pressure reading 2 hours after medication and call if noticing any changes in blood pressure.  agreed to notify the pt.      Losartan 25mg sent to [facility] per [facility]'s valsartan recall algorithm.

## 2018-08-21 ENCOUNTER — ANTICOAGULATION MONITORING (OUTPATIENT)
Dept: VASCULAR LAB | Facility: MEDICAL CENTER | Age: 83
End: 2018-08-21

## 2018-08-21 DIAGNOSIS — Z79.01 CHRONIC ANTICOAGULATION: ICD-10-CM

## 2018-09-25 ENCOUNTER — ANTICOAGULATION MONITORING (OUTPATIENT)
Dept: VASCULAR LAB | Facility: MEDICAL CENTER | Age: 83
End: 2018-09-25

## 2018-09-25 DIAGNOSIS — Z79.01 CHRONIC ANTICOAGULATION: ICD-10-CM

## 2018-10-30 ENCOUNTER — ANTICOAGULATION MONITORING (OUTPATIENT)
Dept: MEDICAL GROUP | Facility: PHYSICIAN GROUP | Age: 83
End: 2018-10-30

## 2018-10-30 DIAGNOSIS — Z79.01 CHRONIC ANTICOAGULATION: ICD-10-CM

## 2018-12-03 ENCOUNTER — ANTICOAGULATION MONITORING (OUTPATIENT)
Dept: VASCULAR LAB | Facility: MEDICAL CENTER | Age: 83
End: 2018-12-03

## 2018-12-03 DIAGNOSIS — Z79.01 CHRONIC ANTICOAGULATION: ICD-10-CM

## 2018-12-03 LAB — INR PPP: 2.9 (ref 2–3.5)

## 2018-12-03 NOTE — PROGRESS NOTES
Anticoagulation Summary  As of 12/3/2018    INR goal:   2.0-3.0   TTR:   35.6 % (7.1 mo)   Today's INR:   2.9   Warfarin maintenance plan:   4 mg (4 mg x 1) every day   Weekly warfarin total:   28 mg   Plan last modified:   Harrison Adames, PharmD (4/23/2018)   Next INR check:   12/17/2018   Target end date:   Indefinite    Indications    Acute thromboembolism of deep veins of lower extremity (HCC) [I82.409]  Pulmonary embolism (HCC) [I26.99]  Chronic anticoagulation [Z79.01]             Anticoagulation Episode Summary     INR check location:   [facility]    Preferred lab:       Send INR reminders to:       Comments:         Anticoagulation Care Providers     Provider Role Specialty Phone number    [facility] Responsible  [phone]    Rambo Shah M.D. Responsible Family Medicine [phone]        Anticoagulation Patient Findings        Spoke to patient on the phone.   INR  therapeutic.   Denies signs/symptoms of bleeding and/or thrombosis.   Denies changes to diet or medications.   Follow up appointment in 2 week(s).    Continue weekly warfarin dose as noted      Harrison Adames, PharmD

## 2018-12-31 ENCOUNTER — ANTICOAGULATION MONITORING (OUTPATIENT)
Dept: VASCULAR LAB | Facility: MEDICAL CENTER | Age: 83
End: 2018-12-31

## 2018-12-31 DIAGNOSIS — Z79.01 CHRONIC ANTICOAGULATION: ICD-10-CM

## 2018-12-31 NOTE — PROGRESS NOTES
Anticoagulation clinic    Reminder voice message for patient regarding getting INR done ASAP for anticoagulation clinic.     I call all 3 numbers.    Harrison Adames, PharmD

## 2019-02-23 DIAGNOSIS — E78.00 PURE HYPERCHOLESTEROLEMIA: Chronic | ICD-10-CM

## 2019-02-26 RX ORDER — SIMVASTATIN 40 MG
TABLET ORAL
Qty: 90 TAB | Refills: 0 | Status: SHIPPED | OUTPATIENT
Start: 2019-02-26 | End: 2021-03-22 | Stop reason: SDUPTHER

## 2019-02-28 ENCOUNTER — ANTICOAGULATION MONITORING (OUTPATIENT)
Dept: VASCULAR LAB | Facility: MEDICAL CENTER | Age: 84
End: 2019-02-28

## 2019-02-28 DIAGNOSIS — Z79.01 CHRONIC ANTICOAGULATION: ICD-10-CM

## 2019-03-23 DIAGNOSIS — I10 ESSENTIAL HYPERTENSION: Primary | ICD-10-CM

## 2019-03-25 RX ORDER — LOSARTAN POTASSIUM 25 MG/1
TABLET ORAL
Qty: 90 TAB | Refills: 1 | Status: SHIPPED | OUTPATIENT
Start: 2019-03-25 | End: 2020-01-28

## 2019-03-28 ENCOUNTER — ANTICOAGULATION MONITORING (OUTPATIENT)
Dept: MEDICAL GROUP | Facility: PHYSICIAN GROUP | Age: 84
End: 2019-03-28

## 2019-03-28 DIAGNOSIS — Z79.01 CHRONIC ANTICOAGULATION: ICD-10-CM

## 2019-03-28 LAB — INR PPP: 2.5 (ref 2–3.5)

## 2019-03-28 NOTE — PROGRESS NOTES
Anticoagulation Summary  As of 3/28/2019    INR goal:   2.0-3.0   TTR:   58.1 % (11 mo)   INR used for dosin.5 (3/28/2019)   Warfarin maintenance plan:   4 mg (4 mg x 1) every day   Weekly warfarin total:   28 mg   Plan last modified:   Harrison Adames, PharmD (2018)   Next INR check:   2019   Target end date:   Indefinite    Indications    Acute thromboembolism of deep veins of lower extremity (HCC) [I82.409]  Pulmonary embolism (HCC) [I26.99]  Chronic anticoagulation [Z79.01]             Anticoagulation Episode Summary     INR check location:   [facility]    Preferred lab:       Send INR reminders to:       Comments:         Anticoagulation Care Providers     Provider Role Specialty Phone number    [facility] Responsible  [phone]    Rambo Shah M.D. Responsible Family Medicine [phone]        Anticoagulation Patient Findings        Spoke to patient on the phone.   INR  therapeutic.   Denies signs/symptoms of bleeding and/or thrombosis.   Denies changes to diet or medications.   Follow up appointment in 4 week(s).    Continue weekly warfarin dose as noted      Harrison Adames, PharmD

## 2019-05-09 ENCOUNTER — ANTICOAGULATION MONITORING (OUTPATIENT)
Dept: MEDICAL GROUP | Facility: PHYSICIAN GROUP | Age: 84
End: 2019-05-09

## 2019-05-09 DIAGNOSIS — Z79.01 CHRONIC ANTICOAGULATION: ICD-10-CM

## 2019-06-04 ENCOUNTER — ANTICOAGULATION MONITORING (OUTPATIENT)
Dept: VASCULAR LAB | Facility: MEDICAL CENTER | Age: 84
End: 2019-06-04

## 2019-06-04 DIAGNOSIS — Z79.01 CHRONIC ANTICOAGULATION: ICD-10-CM

## 2019-06-05 ENCOUNTER — ANTICOAGULATION MONITORING (OUTPATIENT)
Dept: VASCULAR LAB | Facility: MEDICAL CENTER | Age: 84
End: 2019-06-05

## 2019-06-05 DIAGNOSIS — Z79.01 CHRONIC ANTICOAGULATION: ICD-10-CM

## 2019-06-05 DIAGNOSIS — I82.409 ACUTE THROMBOEMBOLISM OF DEEP VEINS OF LOWER EXTREMITY, UNSPECIFIED LATERALITY (HCC): ICD-10-CM

## 2019-06-05 LAB — INR PPP: 1.1 (ref 2–3.5)

## 2019-06-05 NOTE — PROGRESS NOTES
Unable to reach patient at any number listed, all numbers ring then click and hang up after several rings.  Will attempt to reach patient at a later time.  Min Blanc, PharmD

## 2019-06-06 NOTE — PROGRESS NOTES
OP Telephone Anticoagulation Service Note    Date: 2019      Anticoagulation Summary  As of 2019    INR goal:   2.0-3.0   TTR:   54.2 % (1.1 y)   INR used for dosin.10! (2019)   Warfarin maintenance plan:   3 mg (3 mg x 1) every day   Weekly warfarin total:   21 mg   Plan last modified:   Humaira Berry PharmD (2019)   Next INR check:   6/10/2019   Target end date:   Indefinite    Indications    Acute thromboembolism of deep veins of lower extremity (HCC) [I82.409]  Pulmonary embolism (HCC) [I26.99]  Chronic anticoagulation [Z79.01]             Anticoagulation Episode Summary     INR check location:   [facility]    Preferred lab:       Send INR reminders to:       Comments:         Anticoagulation Care Providers     Provider Role Specialty Phone number    [facility] Responsible  [phone]    Rambo Shah M.D. Responsible [facility] [phone]        Anticoagulation Patient Findings        Plan: Spoke with patient on the phone. Patient is SUB therapeutic today. She report she has been taking 3 mg daily for a few months, she also missed a few doses this past week. She has been checking her INR but not reporting it and self adjust her warfarin dose.  Patient denies any changes in medications or diet. Patient denies any signs or symptoms of bleeding or clotting. Instructed patient to call clinic if any unusual bleeding or bruising occurs. Will have pt take 6 mg x 2 days then continue dosing as outlined. Will follow-up with patient in 1 week(s).        Humaira Berry PharmD

## 2019-06-06 NOTE — PROGRESS NOTES
Attempted to reach pt regarding INR result   INR   Date Value Ref Range Status   06/05/2019 1.10  Final     All three numbers ring with No VM set up. Her only emergency contact is her  and his numbers do not work either    Will try to reach again at later time.     Humaira Berry, Pharm D

## 2019-06-10 ENCOUNTER — ANTICOAGULATION MONITORING (OUTPATIENT)
Dept: VASCULAR LAB | Facility: MEDICAL CENTER | Age: 84
End: 2019-06-10

## 2019-06-10 DIAGNOSIS — Z79.01 CHRONIC ANTICOAGULATION: ICD-10-CM

## 2019-06-10 LAB — INR PPP: 2.2 (ref 2–3.5)

## 2019-06-10 NOTE — PROGRESS NOTES
Anticoagulation Summary  As of 6/10/2019    INR goal:   2.0-3.0   TTR:   53.8 % (1.1 y)   INR used for dosin.20 (6/10/2019)   Warfarin maintenance plan:   3 mg (3 mg x 1) every day   Weekly warfarin total:   21 mg   Plan last modified:   Humaira Berry, PharmD (2019)   Next INR check:   2019   Target end date:   Indefinite    Indications    Acute thromboembolism of deep veins of lower extremity (HCC) [I82.409]  Pulmonary embolism (HCC) [I26.99]  Chronic anticoagulation [Z79.01]             Anticoagulation Episode Summary     INR check location:   [facility]    Preferred lab:       Send INR reminders to:       Comments:         Anticoagulation Care Providers     Provider Role Specialty Phone number    [facility] Responsible  [phone]    Rambo Shah M.D. Responsible [facility] [phone]        Anticoagulation Patient Findings          Left voicemail message to report a  therapeutic INR of 2.2.  Pt to continue with current warfarin dosing regimen. Requested pt contact the clinic for any s/s of unusual bleeding, bruising, clotting or any changes to diet or medication.  Follow up in 2 weeks, to reduce the risk of adverse events related to this high risk medication, warfarin.    Denise Huddleston, Clinical Pharmacist

## 2019-07-02 ENCOUNTER — ANTICOAGULATION MONITORING (OUTPATIENT)
Dept: MEDICAL GROUP | Facility: MEDICAL CENTER | Age: 84
End: 2019-07-02

## 2019-07-02 DIAGNOSIS — I26.99 OTHER PULMONARY EMBOLISM WITHOUT ACUTE COR PULMONALE, UNSPECIFIED CHRONICITY (HCC): ICD-10-CM

## 2019-07-02 DIAGNOSIS — I82.409 ACUTE THROMBOEMBOLISM OF DEEP VEINS OF LOWER EXTREMITY, UNSPECIFIED LATERALITY (HCC): ICD-10-CM

## 2019-07-02 DIAGNOSIS — Z79.01 CHRONIC ANTICOAGULATION: ICD-10-CM

## 2019-07-02 LAB — INR PPP: 1.9 (ref 2–3.5)

## 2019-07-02 NOTE — PROGRESS NOTES
Anticoagulation Summary  As of 2019    INR goal:   2.0-3.0   TTR:   54.5 % (1.2 y)   INR used for dosin.90! (2019)   Warfarin maintenance plan:   3 mg (3 mg x 1) every day   Weekly warfarin total:   21 mg   Plan last modified:   Tresa WillisD (2019)   Next INR check:   2019   Target end date:   Indefinite    Indications    Acute thromboembolism of deep veins of lower extremity (HCC) [I82.409]  Pulmonary embolism (HCC) [I26.99]  Chronic anticoagulation [Z79.01]             Anticoagulation Episode Summary     [facility] check location[facility]   [facility]    Preferred lab:       Send INR reminders to:       Comments:         Anticoagulation Care Providers     Provider Role Specialty Phone number    [facility] Responsible  [phone]    Rambo Shah M.D. Responsible [facility] [phone]        Anticoagulation Patient Findings  Patient Findings     Negatives:   Signs/symptoms of thrombosis, Signs/symptoms of bleeding, Laboratory test error suspected, Change in health, Change in alcohol use, Change in activity, Upcoming invasive procedure, Emergency department visit, Upcoming dental procedure, Missed doses, Extra doses, Change in medications, Change in diet/appetite, Hospital admission, Bruising, Other complaints        Spoke with the patient's  on the phone today, reporting a SUB-therapeutic INR of 1.9.  Confirmed the current warfarin dosing regimen and patient compliance. Patient had missed several doses, so for the past 5 days they have been taking a boost dose of 3.5mg QD. Patient denies any interval changes to diet and/or medications. Patient denies any signs/symptoms of bleeding or clotting.  Patient will bolus with 4mg TONIGHT, and then will resume her normal dosing regimen of 3mg QD. Patient will follow up again in 1 week.     Josie GtzD

## 2019-07-16 ENCOUNTER — ANTICOAGULATION MONITORING (OUTPATIENT)
Dept: MEDICAL GROUP | Facility: MEDICAL CENTER | Age: 84
End: 2019-07-16

## 2019-07-16 DIAGNOSIS — Z79.01 CHRONIC ANTICOAGULATION: ICD-10-CM

## 2019-07-16 DIAGNOSIS — I26.99 OTHER PULMONARY EMBOLISM WITHOUT ACUTE COR PULMONALE, UNSPECIFIED CHRONICITY (HCC): ICD-10-CM

## 2019-07-16 DIAGNOSIS — I82.409 ACUTE THROMBOEMBOLISM OF DEEP VEINS OF LOWER EXTREMITY, UNSPECIFIED LATERALITY (HCC): ICD-10-CM

## 2019-07-16 LAB — INR PPP: 2 (ref 2–3.5)

## 2019-07-16 NOTE — PROGRESS NOTES
Anticoagulation Summary  As of 2019    INR goal:   2.0-3.0   TTR:   52.7 % (1.2 y)   INR used for dosin.00 (2019)   Warfarin maintenance plan:   3 mg (3 mg x 1) every day   Weekly warfarin total:   21 mg   No change documented:   Edu Minor Med Ass't   Plan last modified:   Humaira Berry PharmD (2019)   Next INR check:   2019   Target end date:   Indefinite    Indications    Acute thromboembolism of deep veins of lower extremity (HCC) [I82.409]  Pulmonary embolism (HCC) [I26.99]  Chronic anticoagulation [Z79.01]             Anticoagulation Episode Summary     [facility]R check location[facility]   [facility]    Preferred lab:       Send INR reminders to:       Comments:         Anticoagulation Care Providers     Provider Role Specialty Phone number    [facility] Responsible  [phone]    Rambo Shah M.D. Responsible [facility] [phone]        Anticoagulation Patient Findings  Patient Findings     Negatives:   Signs/symptoms of thrombosis, Signs/symptoms of bleeding, Laboratory test error suspected, Change in health, Change in alcohol use, Change in activity, Upcoming invasive procedure, Emergency department visit, Upcoming dental procedure, Missed doses, Extra doses, Change in medications, Change in diet/appetite, Hospital admission, Bruising, Other complaints        Left voicemail message to report therapeutic INR of 2.0.  Patient to continue with current warfarin dosing regimen. Requested pt contact the clinic for any change to diet or medication, and to report any signs or symptoms of bleeding, bruising or clotting.  Pt to follow up in 2 weeks.    Edu Minor, Med Ass't   I have reviewed and agree with the plan  on  2019    Humaira Berry, Pharm D

## 2019-08-13 ENCOUNTER — ANTICOAGULATION MONITORING (OUTPATIENT)
Dept: VASCULAR LAB | Facility: MEDICAL CENTER | Age: 84
End: 2019-08-13

## 2019-08-13 DIAGNOSIS — I82.409 ACUTE THROMBOEMBOLISM OF DEEP VEINS OF LOWER EXTREMITY, UNSPECIFIED LATERALITY (HCC): ICD-10-CM

## 2019-08-13 DIAGNOSIS — I26.99 OTHER PULMONARY EMBOLISM WITHOUT ACUTE COR PULMONALE, UNSPECIFIED CHRONICITY (HCC): ICD-10-CM

## 2019-08-13 DIAGNOSIS — Z79.01 CHRONIC ANTICOAGULATION: ICD-10-CM

## 2019-08-19 ENCOUNTER — ANTICOAGULATION MONITORING (OUTPATIENT)
Dept: VASCULAR LAB | Facility: MEDICAL CENTER | Age: 84
End: 2019-08-19

## 2019-08-19 DIAGNOSIS — Z79.01 CHRONIC ANTICOAGULATION: ICD-10-CM

## 2019-08-19 DIAGNOSIS — I26.99 OTHER PULMONARY EMBOLISM WITHOUT ACUTE COR PULMONALE, UNSPECIFIED CHRONICITY (HCC): ICD-10-CM

## 2019-08-19 DIAGNOSIS — I82.409 ACUTE THROMBOEMBOLISM OF DEEP VEINS OF LOWER EXTREMITY, UNSPECIFIED LATERALITY (HCC): ICD-10-CM

## 2019-08-19 LAB — INR PPP: 2.4 (ref 2–3.5)

## 2019-08-19 NOTE — PROGRESS NOTES
Anticoagulation Summary  As of 2019    INR goal:   2.0-3.0   TTR:   56.1 % (1.3 y)   INR used for dosin.40 (2019)   Warfarin maintenance plan:   3 mg (3 mg x 1) every day   Weekly warfarin total:   21 mg   Plan last modified:   Humaira Berry PharmD (2019)   Next INR check:   2019   Target end date:   Indefinite    Indications    Acute thromboembolism of deep veins of lower extremity (HCC) [I82.409]  Pulmonary embolism (HCC) [I26.99]  Chronic anticoagulation [Z79.01]             Anticoagulation Episode Summary     INR check location[facility]   [facility]    Preferred lab:       Send INR reminders to:       Comments:         Anticoagulation Care Providers     Provider Role Specialty Phone number    [facility] Responsible  [phone]    Rambo Shah M.D. Responsible [facility] [phone]        Anticoagulation Patient Findings          HPI:  Nancy King, on anticoagulation therapy with warfarin for PE.   Changes to current medical/health status since last appt: none  Denies signs/symptoms of bleeding and/or thrombosis since the last appt.    Denies any interval changes to diet  Denies any interval changes to medications since last appt.   Denies any complications or cost restrictions with current therapy.     A/P   INR  therapeutic.   Pt is to continue with current warfarin dosing regimen.     Next INR in 2 week(s).    Doug Castorena, PharmD

## 2019-09-02 LAB — INR PPP: 1.6 (ref 2–3.5)

## 2019-09-04 ENCOUNTER — ANTICOAGULATION MONITORING (OUTPATIENT)
Dept: VASCULAR LAB | Facility: MEDICAL CENTER | Age: 84
End: 2019-09-04

## 2019-09-04 DIAGNOSIS — I26.99 OTHER PULMONARY EMBOLISM WITHOUT ACUTE COR PULMONALE, UNSPECIFIED CHRONICITY (HCC): ICD-10-CM

## 2019-09-04 DIAGNOSIS — Z79.01 CHRONIC ANTICOAGULATION: ICD-10-CM

## 2019-09-04 DIAGNOSIS — I82.409 ACUTE THROMBOEMBOLISM OF DEEP VEINS OF LOWER EXTREMITY, UNSPECIFIED LATERALITY (HCC): ICD-10-CM

## 2019-09-04 NOTE — PROGRESS NOTES
Anticoagulation Summary  As of 2019    INR goal:   2.0-3.0   TTR:   55.9 % (1.3 y)   INR used for dosin.60! (2019)   Warfarin maintenance plan:   3 mg (3 mg x 1) every day   Weekly warfarin total:   21 mg   Plan last modified:   Tresa WillisD (2019)   Next INR check:   2019   Target end date:   Indefinite    Indications    Acute thromboembolism of deep veins of lower extremity (HCC) [I82.409]  Pulmonary embolism (HCC) [I26.99]  Chronic anticoagulation [Z79.01]             Anticoagulation Episode Summary     INR check location:   [facility]    Preferred lab:       Send INR reminders to:       Comments:         Anticoagulation Care Providers     Provider Role Specialty Phone number    [facility] Responsible  [phone]    Rambo Shah M.D. Responsible [facility] [phone]        Anticoagulation Patient Findings     Left voicemail message to report a subtherapeutic INR of 1.6.  Requested pt contact the clinic for any s/s of unusual bleeding, bruising, clotting or any changes to diet or medication.   Instructed patient to bolus with 6mg X 1, then resume current warfarin regimen.  FU 2 weeks.  Min Blanc, TresaD, BCACP

## 2019-10-01 ENCOUNTER — ANTICOAGULATION MONITORING (OUTPATIENT)
Dept: VASCULAR LAB | Facility: MEDICAL CENTER | Age: 84
End: 2019-10-01

## 2019-10-01 DIAGNOSIS — Z79.01 CHRONIC ANTICOAGULATION: ICD-10-CM

## 2019-10-02 ENCOUNTER — ANTICOAGULATION MONITORING (OUTPATIENT)
Dept: VASCULAR LAB | Facility: MEDICAL CENTER | Age: 84
End: 2019-10-02

## 2019-10-02 DIAGNOSIS — I82.409 ACUTE THROMBOEMBOLISM OF DEEP VEINS OF LOWER EXTREMITY, UNSPECIFIED LATERALITY (HCC): ICD-10-CM

## 2019-10-02 DIAGNOSIS — Z79.01 CHRONIC ANTICOAGULATION: ICD-10-CM

## 2019-10-02 LAB — INR PPP: 2.7 (ref 2–3.5)

## 2019-10-07 DIAGNOSIS — I25.10 CORONARY ARTERY DISEASE INVOLVING NATIVE CORONARY ARTERY OF NATIVE HEART WITHOUT ANGINA PECTORIS: Chronic | ICD-10-CM

## 2019-10-07 DIAGNOSIS — R07.89 OTHER CHEST PAIN: ICD-10-CM

## 2019-10-08 RX ORDER — NITROGLYCERIN 0.4 MG/1
TABLET SUBLINGUAL
Qty: 30 TAB | Refills: 0 | Status: SHIPPED | OUTPATIENT
Start: 2019-10-08 | End: 2020-04-03

## 2019-10-21 NOTE — PROGRESS NOTES
Anticoagulation Summary  As of 5/21/2018    INR goal:   2.0-3.0   TTR:   65.9 % (2.1 wk)   Today's INR:   3.2! (5/18/2018)   Warfarin maintenance plan:   4 mg (4 mg x 1) every day   Weekly warfarin total:   28 mg   Plan last modified:   Harrison Adames, PharmD (4/23/2018)   Next INR check:   6/4/2018   Target end date:   Indefinite    Indications    Acute thromboembolism of deep veins of lower extremity (HCC) [I82.409]  Pulmonary embolism (HCC) [I26.99]  Chronic anticoagulation [Z79.01]             Anticoagulation Episode Summary     [facility]R check location:   [facility]    Preferred lab:       Send INR reminders to:       Comments:         Anticoagulation Care Providers     Provider Role Specialty Phone number    [facility] Responsible  [phone]    Rambo Shah M.D. Responsible Family Medicine [phone]        Anticoagulation Patient Findings        Spoke to patient on the phone.   INR  supra-therapeutic.   Denies signs/symptoms of bleeding and/or thrombosis.   Denies changes to diet or medications.   Follow up appointment in 2 week(s).    2mg today then continue weekly warfarin dose as noted      Harrison Adames, PharmD       Review Findings: no new or changing lesions Number Of Photographs Reviewed: 1 Number Of Photographs Reviewed: 19 Detail Level: Detailed

## 2019-12-02 ENCOUNTER — ANTICOAGULATION MONITORING (OUTPATIENT)
Dept: VASCULAR LAB | Facility: MEDICAL CENTER | Age: 84
End: 2019-12-02

## 2019-12-02 DIAGNOSIS — Z79.01 CHRONIC ANTICOAGULATION: ICD-10-CM

## 2020-01-07 ENCOUNTER — TELEPHONE (OUTPATIENT)
Dept: VASCULAR LAB | Facility: MEDICAL CENTER | Age: 85
End: 2020-01-07

## 2020-01-07 NOTE — LETTER
January 7, 2020              Nancy King  [phone]  [facility] [address]        Dear Nancy King ,    We have been unsuccessful in our attempts to contact you regarding your Anticoagulation Service appointments. Warfarin is a potent blood-thinning agent that requires monitoring to ensure that the dosage is correct for your body.  If it isn't, you could develop serious, sometimes life-threatening bleeding problems or life-threatening blood clots or stroke could result.     It is extremely important that you have your lab work drawn as soon as possible.  We are open Monday-Friday 8 am until 5 pm.  You may reach our Service at [phone].     To monitor you effectively, we need to be able to communicate with you.  This is a requirement to be followed by our Service.  If we are unable to contact you on three separate occasions, you will be discharged from the Anticoagulation Service.     If you repeatedly fail to keep your lab appointments, you are at risk of being discharged from the Service.           Sincerely,           Mannie Thao, PharmD, [facility]  Pharmacy Clinical Supervisor  [facility]  Outpatient Anticoagulation Service

## 2020-01-07 NOTE — TELEPHONE ENCOUNTER
Unable to leave voicemail reminder, voicemailbox not yet set up.  Letter sent  Denise Huddleston, Clinical Pharmacist, CDE, CACP

## 2020-01-09 ENCOUNTER — ANTICOAGULATION MONITORING (OUTPATIENT)
Dept: VASCULAR LAB | Facility: MEDICAL CENTER | Age: 85
End: 2020-01-09

## 2020-01-09 DIAGNOSIS — Z79.01 CHRONIC ANTICOAGULATION: ICD-10-CM

## 2020-01-09 LAB — INR PPP: 1.7 (ref 2–3.5)

## 2020-01-10 NOTE — PROGRESS NOTES
Anticoagulation Summary  As of 2020    INR goal:   2.0-3.0   TTR:   58.6 % (1.7 y)   INR used for dosin.70! (2020)   Warfarin maintenance plan:   3 mg (3 mg x 1) every day   Weekly warfarin total:   21 mg   Plan last modified:   Tresa WillisD (2019)   Next INR check:   2020   Target end date:   Indefinite    Indications    Acute thromboembolism of deep veins of lower extremity (HCC) [I82.409]  Pulmonary embolism (HCC) [I26.99]  Chronic anticoagulation [Z79.01]             Anticoagulation Episode Summary     INR check location:   Anticoagulation Clinic    Preferred lab:       Send INR reminders to:       Comments:         Anticoagulation Care Providers     Provider Role Specialty Phone number    [facility] Responsible  [phone]    Rambo Shah M.D. Responsible [facility] [phone]        Anticoagulation Patient Findings        HPI:  Nancy King, on anticoagulation therapy with warfarin for DVT.   Changes to current medical/health status since last appt: none  Denies signs/symptoms of bleeding and/or thrombosis since the last appt.    Denies any interval changes to diet  Denies any interval changes to medications since last appt.   Denies any complications or cost restrictions with current therapy.     A/P   INR  SUB-therapeutic.   Bolus warfarin today then Pt is to continue with current warfarin dosing regimen.     Next INR in 2 week(s).    Doug Castorena, PharmD

## 2020-01-23 ENCOUNTER — ANTICOAGULATION MONITORING (OUTPATIENT)
Dept: VASCULAR LAB | Facility: MEDICAL CENTER | Age: 85
End: 2020-01-23

## 2020-01-23 DIAGNOSIS — Z79.01 CHRONIC ANTICOAGULATION: ICD-10-CM

## 2020-01-23 LAB — INR PPP: 1.5 (ref 2–3.5)

## 2020-01-24 NOTE — PROGRESS NOTES
[facility]    No VM available, try to reach at another time.     Doug Castorena, PharmD

## 2020-01-24 NOTE — PROGRESS NOTES
OP Telephone Anticoagulation Service Note    Date: 2020      Anticoagulation Summary  As of 2020    INR goal:   2.0-3.0   TTR:   57.3 % (1.7 y)   INR used for dosin.50! (2020)   Warfarin maintenance plan:   4 mg (4 mg x 1) every Tue, Thu, Sat; 3 mg (3 mg x 1) all other days   Weekly warfarin total:   24 mg   Plan last modified:   Humaira Berry PharmD (2020)   Next INR check:   2020   Target end date:   Indefinite    Indications    Acute thromboembolism of deep veins of lower extremity (HCC) [I82.409]  Pulmonary embolism (HCC) [I26.99]  Chronic anticoagulation [Z79.01]             Anticoagulation Episode Summary     INR check location:   Anticoagulation Clinic    Preferred lab:       Send INR reminders to:       Comments:         Anticoagulation Care Providers     Provider Role Specialty Phone number    [facility] Responsible  [phone]    Rambo Shah M.D. Responsible Family Medicine [phone]        Anticoagulation Patient Findings        Plan: Spoke with patient's  lei on the phone. Patient is SUB therapeutic today. Confirmed dosing. He reports . Patient denies any changes in medications or diet. Patient denies any signs or symptoms of bleeding or clotting. Instructed patient to call clinic if any unusual bleeding or bruising occurs. Will increase weekly regimen. Will follow-up with patient in 1 week(s).              Humaira Berry, Jeri

## 2020-01-27 DIAGNOSIS — I10 ESSENTIAL HYPERTENSION: ICD-10-CM

## 2020-01-28 RX ORDER — LOSARTAN POTASSIUM 25 MG/1
25 TABLET ORAL DAILY
Qty: 90 TAB | Refills: 0 | Status: SHIPPED | OUTPATIENT
Start: 2020-01-28 | End: 2020-04-20

## 2020-02-11 ENCOUNTER — ANTICOAGULATION MONITORING (OUTPATIENT)
Dept: MEDICAL GROUP | Facility: PHYSICIAN GROUP | Age: 85
End: 2020-02-11

## 2020-02-11 DIAGNOSIS — Z79.01 CHRONIC ANTICOAGULATION: ICD-10-CM

## 2020-02-11 DIAGNOSIS — I82.409 ACUTE THROMBOEMBOLISM OF DEEP VEINS OF LOWER EXTREMITY, UNSPECIFIED LATERALITY (HCC): ICD-10-CM

## 2020-02-11 LAB — INR PPP: 2.3 (ref 2–3.5)

## 2020-02-11 NOTE — PROGRESS NOTES
Anticoagulation Summary  As of 2020    INR goal:   2.0-3.0   TTR:   56.7 % (1.8 y)   INR used for dosin.30 (2020)   Warfarin maintenance plan:   3 mg (3 mg x 1) every day   Weekly warfarin total:   21 mg   Plan last modified:   Min Blanc, PharmD (2020)   Next INR check:   2020   Target end date:   Indefinite    Indications    Acute thromboembolism of deep veins of lower extremity (HCC) [I82.409]  Pulmonary embolism (HCC) [I26.99]  Chronic anticoagulation [Z79.01]             Anticoagulation Episode Summary     INR check location:   Anticoagulation Clinic    Preferred lab:       Send INR reminders to:       Comments:         Anticoagulation Care Providers     Provider Role Specialty Phone number    [facility] Responsible  [phone]    Rambo Shah M.D. Responsible Family [facility] [phone]        Anticoagulation Patient Findings  Patient Findings     Negatives:   Signs/symptoms of thrombosis, Signs/symptoms of bleeding, Laboratory test error suspected, Change in health, Change in alcohol use, Change in activity, Upcoming invasive procedure, Emergency department visit, Upcoming dental procedure, Missed doses, Extra doses, Change in medications, Change in diet/appetite, Hospital admission, Bruising, Other complaints        Spoke with patients  today regarding therapeutic INR of 2.3.  Patient denies any signs/symptoms of bruising or bleeding or any changes in diet and medications.  Instructed patient to call clinic with any questions or concerns.   states she has been doing only 3mg daily for some time and would like to continue that pattern.  Pt is to continue with current warfarin dosing regimen.  Follow up in 2 weeks, to reduce risk of adverse events related to this high risk medication,  Warfarin.    Min Blanc, PharmD, BCACP

## 2020-02-19 ENCOUNTER — OFFICE VISIT (OUTPATIENT)
Dept: CARDIOLOGY | Facility: MEDICAL CENTER | Age: 85
End: 2020-02-19
Payer: MEDICARE

## 2020-02-19 VITALS
WEIGHT: 183 LBS | DIASTOLIC BLOOD PRESSURE: 72 MMHG | HEART RATE: 80 BPM | SYSTOLIC BLOOD PRESSURE: 146 MMHG | HEIGHT: 65 IN | BODY MASS INDEX: 30.49 KG/M2 | OXYGEN SATURATION: 95 %

## 2020-02-19 DIAGNOSIS — I25.10 CORONARY ARTERY DISEASE INVOLVING NATIVE CORONARY ARTERY OF NATIVE HEART WITHOUT ANGINA PECTORIS: Chronic | ICD-10-CM

## 2020-02-19 DIAGNOSIS — E78.5 DYSLIPIDEMIA: ICD-10-CM

## 2020-02-19 DIAGNOSIS — I10 BENIGN ESSENTIAL HTN: Chronic | ICD-10-CM

## 2020-02-19 PROCEDURE — 99214 OFFICE O/P EST MOD 30 MIN: CPT | Performed by: INTERNAL MEDICINE

## 2020-02-19 ASSESSMENT — ENCOUNTER SYMPTOMS
WEIGHT LOSS: 0
NEUROLOGICAL NEGATIVE: 1
WEAKNESS: 0
DEPRESSION: 0
FOCAL WEAKNESS: 0
HEADACHES: 0
BLURRED VISION: 0
DIZZINESS: 0
NERVOUS/ANXIOUS: 0
PALPITATIONS: 0
SHORTNESS OF BREATH: 0
VOMITING: 0
PSYCHIATRIC NEGATIVE: 1
CHILLS: 0
NAUSEA: 0
FEVER: 0
CLAUDICATION: 0
ABDOMINAL PAIN: 0
CONSTITUTIONAL NEGATIVE: 1
MUSCULOSKELETAL NEGATIVE: 1
GASTROINTESTINAL NEGATIVE: 1
BRUISES/BLEEDS EASILY: 0
EYES NEGATIVE: 1
DOUBLE VISION: 0
MYALGIAS: 0
COUGH: 1

## 2020-02-19 NOTE — PROGRESS NOTES
"Chief Complaint   Patient presents with   • Coronary Artery Disease       Subjective:   Nancy King is a 84 y.o. female who presents today for annual follow up of coronary artery disease.    Since the patient's last visit on 06/29/18, she has been doing well clinically. She has rare chest pain. She denies shortness of breath, palpitations, nausea/vomiting or diaphoresis. She enjoys living quiet life with her . She has been off of her metoprolol and her blood pressure has been high.    Past Medical History:   Diagnosis Date   • Acute venous embolism and thrombosis of unspecified deep vessels of lower extremity    • Angina pectoris 5/11/2012    \"with stress\"   • Benign essential HTN 5/11/2012   • CAD (coronary artery disease) 5/11/2012   • Cancer (HCC)     right breast   • Chronic anticoagulation 5/11/2012   • Heart burn    • Hyperlipidemia 5/11/2012   • Hyperlipoproteinemia 5/11/2012   • Indigestion    • Obesity 5/11/2012   • Other specified disorder of intestines     diarrhea   • Pulmonary embolism (HCC)    • Unspecified cataract     removed bilat   • Unspecified disorder of thyroid    • Unspecified urinary incontinence      Past Surgical History:   Procedure Laterality Date   • MASTECTOMY  1/12/2015    Performed by Dwight Birch M.D. at SURGERY [facility]   • NODE BIOPSY SENTINEL  1/12/2015    Performed by Dwight Birch M.D. at SURGERY [facility]   • AXILLARY NODE DISSECTION  1/12/2015    Performed by Dwight [facility] M.D. at [facility]   • APPENDECTOMY     • CHOLECYSTECTOMY     • HYSTERECTOMY, TOTAL ABDOMINAL     • OTHER      Bladder   • TONSILLECTOMY AND ADENOIDECTOMY       Family History   Problem Relation Age of Onset   • Heart Disease Mother    • Heart Disease Father      Social History     Socioeconomic History   • Marital status:      Spouse name: Not on file   • Number of children: Not on file   • Years of education: Not on file   • Highest education level: " Not on file   Occupational History   • Not on file   Social Needs   • Financial resource strain: Not on file   • Food insecurity     Worry: Not on file     Inability: Not on file   • Transportation needs     Medical: Not on file     Non-medical: Not on file   Tobacco Use   • Smoking status: Never Smoker   • Smokeless tobacco: Never Used   Substance and Sexual Activity   • Alcohol use: No   • Drug use: No   • Sexual activity: Not on file   Lifestyle   • Physical activity     Days per week: Not on file     Minutes per session: Not on file   • Stress: Not on file   Relationships   • Social connections     Talks on phone: Not on file     Gets together: Not on file     Attends [employer] witness service: Not on file     Active member of club or organization: Not on file     Attends meetings of clubs or organizations: Not on file     Relationship status: Not on file   • Intimate partner violence     Fear of current or ex partner: Not on file     Emotionally abused: Not on file     Physically abused: Not on file     Forced sexual activity: Not on file   Other Topics Concern   • Not on file   Social History Narrative   • Not on file     Allergies   Allergen Reactions   • Sulfa Drugs      Swelling   • Levaquin      Rash     (Medications reviewed.)  Outpatient Encounter Medications as of 2/19/2020   Medication Sig Dispense Refill   • losartan (COZAAR) 25 MG Tab Take 1 Tab by mouth every day. Needs to be seen for further refills. Thank you 90 Tab 0   • nitroglycerin (NITROSTAT) 0.4 MG SL Tab place 1 tablet under the tongue if needed for chest pain 30 Tab 0   • simvastatin (ZOCOR) 40 MG Tab TAKE 1 TABLET BY MOUTH  EVERY MORNING 90 Tab 0   • Capsaicin-Menthol (PAIN RELIEF EX) by Apply externally route.     • SITagliptin (JANUVIA) 100 MG Tab Take 100 mg by mouth every day.     • warfarin (COUMADIN) 4 MG Tab Take 1-2 Tabs by mouth every day. 180 Tab 3   • levothyroxine (SYNTHROID) 50 MCG TABS Take 50 mcg by mouth every morning before  "breakfast.     • Cholecalciferol (VITAMIN D PO) Take 1 Tab by mouth every morning.     • ascorbic acid (ASCORBIC ACID) 500 MG TABS Take 500 mg by mouth every morning.     • [DISCONTINUED] metoprolol (LOPRESSOR) 25 MG Tab Take 1 Tab by mouth 2 times a day. (Patient not taking: Reported on 2/19/2020) 60 Tab 11     No facility-administered encounter medications on file as of 2/19/2020.      Review of Systems   Constitutional: Negative.  Negative for chills, fever, malaise/fatigue and weight loss.   HENT: Negative.  Negative for hearing loss.    Eyes: Negative.  Negative for blurred vision and double vision.   Respiratory: Positive for cough. Negative for shortness of breath.    Cardiovascular: Positive for chest pain. Negative for palpitations, claudication and leg swelling.   Gastrointestinal: Negative.  Negative for abdominal pain, nausea and vomiting.   Genitourinary: Negative.  Negative for dysuria and urgency.   Musculoskeletal: Negative.  Negative for joint pain and myalgias.   Skin: Negative.  Negative for itching and rash.   Neurological: Negative.  Negative for dizziness, focal weakness, weakness and headaches.   Endo/Heme/Allergies: Negative.  Does not bruise/bleed easily.   Psychiatric/Behavioral: Negative.  Negative for depression. The patient is not nervous/anxious.         Objective:   /72 (BP Location: Right arm, Patient Position: Sitting, BP Cuff Size: Adult)   Pulse 80   Ht 1.651 m (5' 5\")   Wt 83 kg (183 lb)   SpO2 95%   BMI 30.45 kg/m²     Physical Exam   Constitutional: She is oriented to person, place, and time. She appears well-developed and well-nourished.   HENT:   Head: Normocephalic and atraumatic.   Eyes: EOM are normal.   Neck: No JVD present.   Cardiovascular: Normal rate, regular rhythm and normal heart sounds.   Pulmonary/Chest: Effort normal and breath sounds normal.   Abdominal: Soft. Bowel sounds are normal.   No hepatosplenomegaly.   Musculoskeletal: Normal range of motion. "   Lymphadenopathy:     She has no cervical adenopathy.   Neurological: She is alert and oriented to person, place, and time.   Skin: Skin is warm and dry.   Psychiatric: She has a normal mood and affect.     CARDIAC STUDIES/PROCEDURES:     CTA OF CHEST (01/30/15)  1.  Extensive pulmonary thromboembolism involving majority of right pulmonary arteries and significant portions of left pulmonary arteries  2.  No other significant finding  3.  Right breast postoperative changes  4.  Aortic atherosclerotic plaque  5.  Infiltration of liver     ECHOCARDIOGRAM CONCLUSIONS (06/11/18)  Prior echo done 02/01/15. Compared to the report of the study done -   there has been no significant change.   Normal left ventricular systolic function.  Left ventricular ejection fraction is visually estimated to be 60%.  Grade I diastolic dysfunction.  Mild mitral regurgitation.  Mild tricuspid regurgitation.  Estimated right ventricular systolic pressure is 25 mmHg.     ECHOCARDIOGRAM CONCLUSIONS (02/01/15)  No prior study is available for comparison.   Normal left ventricular size and function. Grade I diastolic   dysfunction - mitral inflow E/A is <1.0. Left ventricular ejection   fraction is 60% to 65%. Mild concentric left ventricular hypertrophy.  No significant valve abnormalities.   Right ventricular systolic pressure is estimated to be 45-50 mmHg.  Normal pericardium without effusion.     EKG performed on (05/24/18) EKG shows sinus rhythm with diffuse ST abnormalities.     Laboratory results of (06/15/18) Cholesterol profile of 172/147/63/80 noted.  Laboratory results of (02/05/15) Cholesterol profile of 129/144/34/66 noted.     LOWER EXTREMITY VENOUS ULTRASOUND (01/30/15)  1.  Normal right lower extremity superficial and deep venous examination.   2.  Left lower extremity deep and superficial venous thrombosis.   3.  Left popliteal cyst.     MYOCARDIAL PERFUSION STUDY CONCLUSIONS (06/15/18)  Very small reversible defect is noted at  the apical cap in the stress images, concerning for ischemia. Artifact cannot be ruled out.  Normal left ventricular wall motion, with EF of 75%.   ECG INTERPRETATION  Negative stress ECG for ischemia.    Assessment:     1. Coronary artery disease involving native coronary artery of native heart without angina pectoris     2. Benign essential HTN     3. Dyslipidemia       Medical Decision Making:  Today's Assessment / Status / Plan:     1. Coronary artery disease (of left anterior descending artery diagnosed in [place], [place] [address]): She remains clinically doing well. I will continue with current medical care including metoprolol,m losartan and simvastatin.  2. Hypertension: Blood pressure has been high. We will restart metoprolol 25 mg BID and angiotensin receptor blockade.  3. Hyperlipidemia: She is doing well on statin therapy without myalgia symptoms. We will repeat labs including fasting lipid profile.  4. Pulmonary embolism with deep venous thrombosis on chronic anticoagulation therapy (warfarin): She is clinically doing well without recurrence or chest pain or shortness of breath. She has chronic edema of her left lower extremity.     We will follow up in two months.     CC Dee Saldana

## 2020-02-25 ENCOUNTER — ANTICOAGULATION MONITORING (OUTPATIENT)
Dept: VASCULAR LAB | Facility: MEDICAL CENTER | Age: 85
End: 2020-02-25

## 2020-02-25 DIAGNOSIS — Z79.01 CHRONIC ANTICOAGULATION: ICD-10-CM

## 2020-02-25 DIAGNOSIS — I82.409 ACUTE THROMBOEMBOLISM OF DEEP VEINS OF LOWER EXTREMITY, UNSPECIFIED LATERALITY (HCC): ICD-10-CM

## 2020-02-25 LAB — INR PPP: 3.3 (ref 2–3.5)

## 2020-02-25 NOTE — LETTER
February 28, 2020          Nancy King  [phone]  [facility] [address]           Dear Nancy King ,     We have been unsuccessful in our attempts to contact you regarding your Anticoagulation Service appointments. Warfarin is a potent blood-thinning agent that requires monitoring to ensure that the dosage is correct for your body.  If it isn't, you could develop serious, sometimes life-threatening bleeding problems or life-threatening blood clots or stroke could result.     It is extremely important that you have your lab work drawn as soon as possible.  We are open Monday-Friday 8 am until 5 pm.  You may reach our Service at [phone].     To monitor you effectively, we need to be able to communicate with you.  This is a requirement to be followed by our Service.  If we are unable to contact you on three separate occasions, you will be discharged from the Anticoagulation Service.     If you repeatedly fail to keep your lab appointments, you are at risk of being discharged from the Service.           Sincerely,           Mannie Thao, PharmD, [facility]  Pharmacy Clinical Supervisor  [facility]  Outpatient Anticoagulation Service

## 2020-02-26 NOTE — PROGRESS NOTES
Attempted to reach patient again. Both numbers were unable to leave VM.  Will try again at later time.     11:12am  2/26/20  Josie Gong PharmD

## 2020-02-28 NOTE — PROGRESS NOTES
Attempted to contact patient again regarding INR results but unable to LM on either phone numbers listed.   Will try again later and will send a letter.     3pm 2/28/20  Josie GtzD

## 2020-03-01 LAB — INR PPP: 3 (ref 2–3.5)

## 2020-03-04 ENCOUNTER — ANTICOAGULATION MONITORING (OUTPATIENT)
Dept: VASCULAR LAB | Facility: MEDICAL CENTER | Age: 85
End: 2020-03-04

## 2020-03-04 DIAGNOSIS — I82.409 ACUTE THROMBOEMBOLISM OF DEEP VEINS OF LOWER EXTREMITY, UNSPECIFIED LATERALITY (HCC): ICD-10-CM

## 2020-03-04 DIAGNOSIS — Z79.01 CHRONIC ANTICOAGULATION: ICD-10-CM

## 2020-03-05 NOTE — PROGRESS NOTES
Anticoagulation Summary  As of 3/4/2020    INR goal:   2.0-3.0   TTR:   56.6 % (1.8 y)   INR used for dosing:   3.00 (3/1/2020)   Warfarin maintenance plan:   3 mg (3 mg x 1) every day   Weekly warfarin total:   21 mg   Plan last modified:   Min Blanc, PharmD (2/11/2020)   Next INR check:   3/15/2020   Target end date:   Indefinite    Indications    Acute thromboembolism of deep veins of lower extremity (HCC) [I82.409]  Pulmonary embolism (HCC) [I26.99]  Chronic anticoagulation [Z79.01]             Anticoagulation Episode Summary     INR check location:   Anticoagulation Clinic    Preferred lab:       Send INR reminders to:       Comments:         Anticoagulation Care Providers     Provider Role Specialty Phone number    [facility] Responsible  [phone]    Rambo Shah M.D. Responsible Family Medicine [phone]        Anticoagulation Patient Findings    Spoke to patient's  on phone. No current signs of bleeding or thrombosis. No interval medication or diet changes. Continue current dose of warfarin. Follow up INR in 2 week(s).    Mannie Thao, PharmD

## 2020-03-16 ENCOUNTER — ANTICOAGULATION MONITORING (OUTPATIENT)
Dept: VASCULAR LAB | Facility: MEDICAL CENTER | Age: 85
End: 2020-03-16

## 2020-03-16 DIAGNOSIS — I82.409 ACUTE THROMBOEMBOLISM OF DEEP VEINS OF LOWER EXTREMITY, UNSPECIFIED LATERALITY (HCC): ICD-10-CM

## 2020-03-16 DIAGNOSIS — Z79.01 CHRONIC ANTICOAGULATION: ICD-10-CM

## 2020-03-16 LAB — INR PPP: 5 (ref 2–3.5)

## 2020-03-16 NOTE — PROGRESS NOTES
Attempted to reach patient again today.  All phone #'s listed, including emergency contact) go immediately to  and do not have VM set up.  Will continue to attempt reaching patient by phone as there is no MyChart set up or email listed.  Min Blanc, PharmD, BCACP      Attempted to call home ph# and work ph# on file to report supratherapeutic INR, but unable to leave . Will attempt later.  Rin Spence, TresaD

## 2020-03-16 NOTE — LETTER
March 17, 2020          Nancy King  [phone]  [facility] [address]        Dear Nancy King ,    We have been unsuccessful in our attempts to contact you regarding your Anticoagulation Service appointments. Warfarin is a potent blood-thinning agent that requires monitoring to ensure that the dosage is correct for your body.  If it isn't, you could develop serious, sometimes life-threatening bleeding problems or life-threatening blood clots or stroke could result.     It is extremely important that you have your lab work drawn as soon as possible.  We are open Monday-Friday 8 am until 5 pm.  You may reach our Service at [phone].     To monitor you effectively, we need to be able to communicate with you.  This is a requirement to be followed by our Service.  If we are unable to contact you on three separate occasions, you will be discharged from the Anticoagulation Service.     If you repeatedly fail to keep your lab appointments, you are at risk of being discharged from the Service.           Sincerely,           Mannie Thao, PharmD, [facility]  Pharmacy Clinical Supervisor  [facility]  Outpatient Anticoagulation Service

## 2020-03-17 NOTE — PROGRESS NOTES
Unable to reach pt. VM not set up  Letter sent  Denise Huddleston, Clinical Pharmacist, CDE, CACP

## 2020-04-03 DIAGNOSIS — R07.89 OTHER CHEST PAIN: ICD-10-CM

## 2020-04-03 DIAGNOSIS — I25.10 CORONARY ARTERY DISEASE INVOLVING NATIVE CORONARY ARTERY OF NATIVE HEART WITHOUT ANGINA PECTORIS: Chronic | ICD-10-CM

## 2020-04-03 RX ORDER — NITROGLYCERIN 0.4 MG/1
TABLET SUBLINGUAL
Qty: 25 TAB | Refills: 5 | Status: SHIPPED | OUTPATIENT
Start: 2020-04-03 | End: 2021-02-26 | Stop reason: SDUPTHER

## 2020-04-19 DIAGNOSIS — I10 ESSENTIAL HYPERTENSION: ICD-10-CM

## 2020-04-20 RX ORDER — LOSARTAN POTASSIUM 25 MG/1
TABLET ORAL
Qty: 90 TAB | Refills: 3 | Status: SHIPPED | OUTPATIENT
Start: 2020-04-20 | End: 2020-11-16

## 2020-04-24 ENCOUNTER — ANTICOAGULATION MONITORING (OUTPATIENT)
Dept: VASCULAR LAB | Facility: MEDICAL CENTER | Age: 85
End: 2020-04-24

## 2020-04-24 DIAGNOSIS — I26.99 PULMONARY EMBOLISM, UNSPECIFIED CHRONICITY, UNSPECIFIED PULMONARY EMBOLISM TYPE, UNSPECIFIED WHETHER ACUTE COR PULMONALE PRESENT (HCC): ICD-10-CM

## 2020-04-24 DIAGNOSIS — Z79.01 CHRONIC ANTICOAGULATION: ICD-10-CM

## 2020-04-24 DIAGNOSIS — I82.409 ACUTE THROMBOEMBOLISM OF DEEP VEINS OF LOWER EXTREMITY, UNSPECIFIED LATERALITY (HCC): ICD-10-CM

## 2020-04-24 LAB — INR PPP: 3.7 (ref 2–3.5)

## 2020-04-24 NOTE — PROGRESS NOTES
Anticoagulation Summary  As of 4/24/2020    INR goal:   2.0-3.0   TTR:   52.3 % (2 y)   INR used for dosing:   3.70! (4/24/2020)   Warfarin maintenance plan:   3 mg (3 mg x 1) every day   Weekly warfarin total:   21 mg   Plan last modified:   Min Blanc, PharmD (2/11/2020)   Next INR check:   5/8/2020   Target end date:   Indefinite    Indications    Acute thromboembolism of deep veins of lower extremity (HCC) [I82.409]  Pulmonary embolism (HCC) [I26.99]  Chronic anticoagulation [Z79.01]             Anticoagulation Episode Summary     INR check location:   Anticoagulation Clinic    Preferred lab:       Send INR reminders to:       Comments:         Anticoagulation Care Providers     Provider Role Specialty Phone number    [facility] Responsible  [phone]    Rambo Shah M.D. Responsible [facility] [phone]        Anticoagulation Patient Findings  Patient Findings     Negatives:   Signs/symptoms of thrombosis, Signs/symptoms of bleeding, Laboratory test error suspected, Change in health, Change in alcohol use, Change in activity, Upcoming invasive procedure, Emergency department visit, Upcoming dental procedure, Missed doses, Extra doses, Change in medications, Change in diet/appetite, Hospital admission, Bruising, Other complaints         Spoke with the patient on the phone today, reporting a SUPRA-therapeutic INR of 3.7. Confirmed the current warfarin dosing regimen and patient compliance. Patient denies any interval changes to diet and/or medications. Patient denies any signs/symptoms of bleeding or clotting.  Patient asked to HOLD dose TONIGHT ONLY, then to resume her current dosing regimen. Patient will retest again in 2 weeks.     Josie GtzD

## 2020-05-03 LAB — INR PPP: 2.5 (ref 2–3.5)

## 2020-05-06 ENCOUNTER — ANTICOAGULATION MONITORING (OUTPATIENT)
Dept: VASCULAR LAB | Facility: MEDICAL CENTER | Age: 85
End: 2020-05-06

## 2020-05-06 DIAGNOSIS — I82.409 ACUTE THROMBOEMBOLISM OF DEEP VEINS OF LOWER EXTREMITY, UNSPECIFIED LATERALITY (HCC): ICD-10-CM

## 2020-05-06 DIAGNOSIS — Z79.01 CHRONIC ANTICOAGULATION: ICD-10-CM

## 2020-05-06 DIAGNOSIS — I26.99 PULMONARY EMBOLISM, UNSPECIFIED CHRONICITY, UNSPECIFIED PULMONARY EMBOLISM TYPE, UNSPECIFIED WHETHER ACUTE COR PULMONALE PRESENT (HCC): ICD-10-CM

## 2020-05-06 NOTE — PROGRESS NOTES
Anticoagulation Summary  As of 2020    INR goal:   2.0-3.0   TTR:   52.2 % (2 y)   INR used for dosin.50 (5/3/2020)   Warfarin maintenance plan:   3 mg (3 mg x 1) every day   Weekly warfarin total:   21 mg   Plan last modified:   Min Blanc, PharmD (2020)   Next INR check:      Target end date:   Indefinite    Indications    Acute thromboembolism of deep veins of lower extremity (HCC) [I82.409]  Pulmonary embolism (HCC) [I26.99]  Chronic anticoagulation [Z79.01]             Anticoagulation Episode Summary     INR check location:   Anticoagulation Clinic    Preferred lab:       Send INR reminders to:       Comments:         Anticoagulation Care Providers     Provider Role Specialty Phone number    [facility] Responsible  [phone]    Rambo Shah M.D. Responsible [facility] [phone]        Anticoagulation Patient Findings          Spoke with Will to report a therapeutic INR of 2.5. Continue current dosing regimen.  Follow up in 4 weeks, to reduce the risk of adverse events related to this high risk medication, warfarin.    Denise Huddleston, Clinical Pharmacist

## 2020-06-23 ENCOUNTER — TELEPHONE (OUTPATIENT)
Dept: VASCULAR LAB | Facility: MEDICAL CENTER | Age: 85
End: 2020-06-23

## 2020-06-23 NOTE — TELEPHONE ENCOUNTER
[facility]    Left VM with pt requesting a call back to remind her to obtain next INR and follow up with current warfarin regimen.  Received refill request for warfarin but pt is overdue for next INR.    Doug Castorena, PharmD

## 2020-06-27 LAB — INR PPP: 2.1 (ref 2–3.5)

## 2020-07-16 ENCOUNTER — TELEPHONE (OUTPATIENT)
Dept: VASCULAR LAB | Facility: MEDICAL CENTER | Age: 85
End: 2020-07-16

## 2020-07-16 NOTE — LETTER
July 16, 2020          Nancy King  [phone]  [facility] [address]        Dear Nancy King ,    We have been unsuccessful in our attempts to contact you regarding your Anticoagulation Service appointments. Warfarin is a potent blood-thinning agent that requires monitoring to ensure that the dosage is correct for your body.  If it isn't, you could develop serious, sometimes life-threatening bleeding problems or life-threatening blood clots or stroke could result.     It is extremely important that you have your lab work drawn as soon as possible.  We are open Monday-Friday 8 am until 5 pm.  You may reach our Service at [phone].     To monitor you effectively, we need to be able to communicate with you.  This is a requirement to be followed by our Service.  If we are unable to contact you on three separate occasions, you will be discharged from the Anticoagulation Service.     If you repeatedly fail to keep your lab appointments, you are at risk of being discharged from the Service.           Sincerely,           Mannie Thao, PharmD, [facility]  Pharmacy Clinical Supervisor  [facility]  Outpatient Anticoagulation Service

## 2020-07-16 NOTE — TELEPHONE ENCOUNTER
Unable to leave VM message to check INR. VM is not set up.  Letter sent  Denise Huddleston, Clinical Pharmacist, CDE, CACP

## 2020-07-22 LAB — INR PPP: 2.8 (ref 2–3.5)

## 2020-07-23 ENCOUNTER — ANTICOAGULATION MONITORING (OUTPATIENT)
Dept: VASCULAR LAB | Facility: MEDICAL CENTER | Age: 85
End: 2020-07-23

## 2020-07-23 DIAGNOSIS — I82.409 ACUTE THROMBOEMBOLISM OF DEEP VEINS OF LOWER EXTREMITY, UNSPECIFIED LATERALITY (HCC): ICD-10-CM

## 2020-07-23 DIAGNOSIS — I26.99 PULMONARY EMBOLISM, UNSPECIFIED CHRONICITY, UNSPECIFIED PULMONARY EMBOLISM TYPE, UNSPECIFIED WHETHER ACUTE COR PULMONALE PRESENT (HCC): ICD-10-CM

## 2020-07-23 DIAGNOSIS — Z79.01 CHRONIC ANTICOAGULATION: ICD-10-CM

## 2020-07-23 NOTE — LETTER
July 24, 2020      We have been unsuccessful in our attempts to contact you regarding your most recent Iab work.  Your phone number on file is out of service. VOICEMAILBOX IS NOT YET SET UP.   Your lNR on  was NOT in the therapeutic range we need to be able to speak to you by telephone.     Please contact us with an updated phone number as soon as possible.  We are open Monday-Friday 8 am until 5 pm.  You may reach our Service at [phone].    Let me know if you have any questions.      Sincerely,          Mannie Thao, PharmD, BCACP, BCPS-AQ Cardiology  Ambulatory Pharmacy Manager  [facility]  Anticoagulation Services

## 2020-07-24 NOTE — PROGRESS NOTES
I attempted to call this patient to report their INR.  Unfortunately I was not able to speak with them or leave a voice message.  We will need to call back and attempt to reach them at a later time.    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 Responsible [facility] [phone]        Anticoagulation Patient Findings        HPI:  Nancy King, on anticoagulation therapy with warfarin for PE.   Changes to current medical/health status since last appt: none  Denies signs/symptoms of bleeding and/or thrombosis since the last appt.    Denies any interval changes to diet  Denies any interval changes to medications since last appt.   Denies any complications or cost restrictions with current therapy.     A/P   INR  therapeutic.   Pt is to continue with current warfarin dosing regimen.     Next INR in 2 weeks per pt request.     Doug Castorena, PharmD

## 2020-08-24 LAB — INR PPP: 2 (ref 2–3.5)

## 2020-08-25 ENCOUNTER — TELEPHONE (OUTPATIENT)
Dept: VASCULAR LAB | Facility: MEDICAL CENTER | Age: 85
End: 2020-08-25

## 2020-08-25 ENCOUNTER — ANTICOAGULATION MONITORING (OUTPATIENT)
Dept: VASCULAR LAB | Facility: MEDICAL CENTER | Age: 85
End: 2020-08-25

## 2020-08-25 DIAGNOSIS — Z79.01 CHRONIC ANTICOAGULATION: ICD-10-CM

## 2020-08-25 DIAGNOSIS — I82.409 ACUTE THROMBOEMBOLISM OF DEEP VEINS OF LOWER EXTREMITY, UNSPECIFIED LATERALITY (HCC): ICD-10-CM

## 2020-08-25 DIAGNOSIS — I26.99 PULMONARY EMBOLISM, UNSPECIFIED CHRONICITY, UNSPECIFIED PULMONARY EMBOLISM TYPE, UNSPECIFIED WHETHER ACUTE COR PULMONALE PRESENT (HCC): ICD-10-CM

## 2020-08-25 NOTE — PROGRESS NOTES
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 Responsible [facility] [phone]        Anticoagulation Patient Findings          Left voicemail message to report a  therapeutic INR of 2.0.  Pt to continue with current warfarin dosing regimen. Requested pt contact the clinic for any s/s of unusual bleeding, bruising, clotting or any changes to diet or medication.  Follow up in 4 weeks, to reduce the risk of adverse events related to this high risk medication, warfarin.    Denise Huddleston, Clinical Pharmacist

## 2020-09-08 ENCOUNTER — ANTICOAGULATION MONITORING (OUTPATIENT)
Dept: VASCULAR LAB | Facility: MEDICAL CENTER | Age: 85
End: 2020-09-08

## 2020-09-08 DIAGNOSIS — Z79.01 CHRONIC ANTICOAGULATION: Chronic | ICD-10-CM

## 2020-09-08 DIAGNOSIS — I26.99 PULMONARY EMBOLISM, UNSPECIFIED CHRONICITY, UNSPECIFIED PULMONARY EMBOLISM TYPE, UNSPECIFIED WHETHER ACUTE COR PULMONALE PRESENT (HCC): ICD-10-CM

## 2020-09-08 DIAGNOSIS — Z79.01 CHRONIC ANTICOAGULATION: ICD-10-CM

## 2020-09-08 DIAGNOSIS — I82.409 ACUTE THROMBOEMBOLISM OF DEEP VEINS OF LOWER EXTREMITY, UNSPECIFIED LATERALITY (HCC): Chronic | ICD-10-CM

## 2020-09-08 DIAGNOSIS — I82.409 ACUTE THROMBOEMBOLISM OF DEEP VEINS OF LOWER EXTREMITY, UNSPECIFIED LATERALITY (HCC): ICD-10-CM

## 2020-09-08 LAB — INR PPP: 3.6 (ref 2–3.5)

## 2020-09-08 RX ORDER — WARFARIN SODIUM 1 MG/1
TABLET ORAL
Qty: 270 TAB | Refills: 1 | Status: SHIPPED | OUTPATIENT
Start: 2020-09-08 | End: 2021-03-19

## 2020-10-07 ENCOUNTER — ANTICOAGULATION MONITORING (OUTPATIENT)
Dept: VASCULAR LAB | Facility: MEDICAL CENTER | Age: 85
End: 2020-10-07

## 2020-10-07 DIAGNOSIS — I82.409 ACUTE THROMBOEMBOLISM OF DEEP VEINS OF LOWER EXTREMITY, UNSPECIFIED LATERALITY (HCC): ICD-10-CM

## 2020-10-07 DIAGNOSIS — Z79.01 CHRONIC ANTICOAGULATION: ICD-10-CM

## 2020-10-07 DIAGNOSIS — I26.99 PULMONARY EMBOLISM, UNSPECIFIED CHRONICITY, UNSPECIFIED PULMONARY EMBOLISM TYPE, UNSPECIFIED WHETHER ACUTE COR PULMONALE PRESENT (HCC): ICD-10-CM

## 2020-10-07 LAB — INR PPP: 2.7 (ref 2–3.5)

## 2020-10-07 NOTE — LETTER
We have been unsuccessful in our attempts to contact you regarding your most recent Iab work.  Your phone number on file is out of service. Your lNR on  was NOT in the therapeutic range we need to be able to speak to you by telephone.     Please contact us with an updated phone number as soon as possible.  We are open Monday-Friday 8 am until 5 pm.  You may reach our Service at [phone].    Let me know if you have any questions.      Sincerely,          Mannie Thao, PharmD, BCACP, BCPS- Cardiology  Ambulatory Pharmacy Manager  [facility]  Anticoagulation Services

## 2020-10-07 NOTE — PROGRESS NOTES
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 Responsible [facility] [phone]        Anticoagulation Patient Findings    Unable to discuss anticoagulation management.  Both phones on file seem to be out of order  Will try again soon  Denise Huddleston Clinical Pharmacist, MELL SIM    10/9/2020 phone remains out of service  Letter sent  Denise Huddleston Clinical Pharmacist, CDE, CACP

## 2020-10-16 ENCOUNTER — TELEPHONE (OUTPATIENT)
Dept: CARDIOLOGY | Facility: MEDICAL CENTER | Age: 85
End: 2020-10-16

## 2020-11-16 ENCOUNTER — TELEPHONE (OUTPATIENT)
Dept: CARDIOLOGY | Facility: MEDICAL CENTER | Age: 85
End: 2020-11-16

## 2020-11-16 DIAGNOSIS — I10 ESSENTIAL HYPERTENSION: ICD-10-CM

## 2020-11-16 RX ORDER — LOSARTAN POTASSIUM 50 MG/1
50 TABLET ORAL
Qty: 90 TAB | Refills: 3 | Status: SHIPPED | OUTPATIENT
Start: 2020-11-16 | End: 2021-11-02

## 2020-11-16 NOTE — TELEPHONE ENCOUNTER
Pt was unable to complete virtual visit with Dr. Obrien today due to internet connection difficulties. S/w pt over phone and reviewed vital signs. Per pt, weight is stable, 's/75; HR 60's; O2 98%, she has no complaints/symptoms to report. She takes her medications as prescribed.     Discussed elevated BP with Dr. Obrien. Losartan will be increased to 50mg daily.     Pt notified of the above and discussed indication for increasing losartan. Pt advised to check BP daily and discussed target BP range of 130/80.  Pt agrees with plan and will take 2 of her current 25mg losartan tabs for now until new Rx is received from mail order.

## 2020-12-04 ENCOUNTER — ANTICOAGULATION MONITORING (OUTPATIENT)
Dept: MEDICAL GROUP | Facility: PHYSICIAN GROUP | Age: 85
End: 2020-12-04

## 2020-12-04 DIAGNOSIS — I82.409 ACUTE THROMBOEMBOLISM OF DEEP VEINS OF LOWER EXTREMITY, UNSPECIFIED LATERALITY (HCC): ICD-10-CM

## 2020-12-04 DIAGNOSIS — Z79.01 CHRONIC ANTICOAGULATION: ICD-10-CM

## 2020-12-04 DIAGNOSIS — I26.99 PULMONARY EMBOLISM, UNSPECIFIED CHRONICITY, UNSPECIFIED PULMONARY EMBOLISM TYPE, UNSPECIFIED WHETHER ACUTE COR PULMONALE PRESENT (HCC): ICD-10-CM

## 2020-12-04 LAB — INR PPP: 3.1 (ref 2–3.5)

## 2020-12-07 NOTE — PROGRESS NOTES
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 Responsible [facility] [phone]        Anticoagulation Patient Findings    Left a message on the patient's voicemail today, informing the patient of a SUPRA-therapeutic INR of 3.1.   Instructed the patient to call the clinic with any changes to diet or medications, with any signs/sx of bleeding or clotting or with any questions or concerns.     Patient instructed to reduce dose to 2mg TONIGHT ONLY, as a one time dose adjustment, then to continue with the current warfarin dosing regimen. Patient asked to follow up again in 2 weeks.     Josie GtzD

## 2020-12-18 DIAGNOSIS — Z79.01 CHRONIC ANTICOAGULATION: Chronic | ICD-10-CM

## 2020-12-18 DIAGNOSIS — I25.10 CORONARY ARTERY DISEASE INVOLVING NATIVE CORONARY ARTERY OF NATIVE HEART WITHOUT ANGINA PECTORIS: Chronic | ICD-10-CM

## 2020-12-18 DIAGNOSIS — I10 BENIGN ESSENTIAL HTN: Chronic | ICD-10-CM

## 2020-12-21 ENCOUNTER — ANTICOAGULATION MONITORING (OUTPATIENT)
Dept: MEDICAL GROUP | Facility: PHYSICIAN GROUP | Age: 85
End: 2020-12-21

## 2020-12-21 DIAGNOSIS — I82.409 ACUTE THROMBOEMBOLISM OF DEEP VEINS OF LOWER EXTREMITY, UNSPECIFIED LATERALITY (HCC): ICD-10-CM

## 2020-12-21 DIAGNOSIS — I26.99 PULMONARY EMBOLISM, UNSPECIFIED CHRONICITY, UNSPECIFIED PULMONARY EMBOLISM TYPE, UNSPECIFIED WHETHER ACUTE COR PULMONALE PRESENT (HCC): ICD-10-CM

## 2020-12-21 DIAGNOSIS — Z79.01 CHRONIC ANTICOAGULATION: ICD-10-CM

## 2020-12-21 LAB — INR PPP: 2.5 (ref 2–3.5)

## 2020-12-21 NOTE — LETTER
Nancy King  [phone] [facility] [address]    12/23/20    Dear Nancy King ,    We have been unsuccessful in our attempts to contact you regarding your Anticoagulation Service appointments. Warfarin is a potent blood-thinning agent that requires monitoring to ensure that the dosage is correct for your body.  If it isn't, you could develop serious, sometimes life-threatening bleeding problems or life-threatening blood clots or stroke could result.    To monitor you effectively, we need to be able to communicate with you.  This is a requirement to be followed by our Service.       If you repeatedly fail to keep your lab appointments, you are at risk of being discharged from the Anticoagulation Service.    It is extremely important that you contact the clinic as soon as possible to arrange appropriate follow up.  We are open Monday-Friday 8 am until 5 pm.  You may reach our Service at [phone].           Sincerely,           Doug Castorena, PharmD, [facility]  Clinic Supervisor  [facility]  Outpatient Anticoagulation Service

## 2020-12-22 NOTE — PROGRESS NOTES
attempted to reach patient about INR but first number listed does not have VM set up and the other was busy busy.   Will try again tomorrow.     12/21/20  5:40pm  Josie GtzD      Attempted to reach patient at both phone numbers listed, same result as above, will attempt to reach at a later time.    12/22/20  1316  Min Blanc, Jeri, BCACP

## 2021-01-12 ENCOUNTER — ANTICOAGULATION MONITORING (OUTPATIENT)
Dept: VASCULAR LAB | Facility: MEDICAL CENTER | Age: 86
End: 2021-01-12

## 2021-01-12 DIAGNOSIS — Z79.01 CHRONIC ANTICOAGULATION: ICD-10-CM

## 2021-01-12 DIAGNOSIS — I26.99 PULMONARY EMBOLISM, UNSPECIFIED CHRONICITY, UNSPECIFIED PULMONARY EMBOLISM TYPE, UNSPECIFIED WHETHER ACUTE COR PULMONALE PRESENT (HCC): ICD-10-CM

## 2021-01-12 DIAGNOSIS — I82.409 ACUTE THROMBOEMBOLISM OF DEEP VEINS OF LOWER EXTREMITY, UNSPECIFIED LATERALITY (HCC): ICD-10-CM

## 2021-01-12 LAB — INR PPP: 2.4 (ref 2–3.5)

## 2021-01-13 NOTE — PROGRESS NOTES
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 Responsible [facility] [phone]        Anticoagulation Patient Findings  Patient Findings     Negatives:  Signs/symptoms of thrombosis, Signs/symptoms of bleeding, Laboratory test error suspected, Change in health, Change in alcohol use, Change in activity, Upcoming invasive procedure, Emergency department visit, Upcoming dental procedure, Missed doses, Extra doses, Change in medications, Change in diet/appetite, Hospital admission, Bruising, Other complaints        Spoke with the patient's  on the phone today, reporting a therapeutic INR of 2.4.   Confirmed the current warfarin dosing regimen and patient compliance.  Patient denies any interval changes to diet and/or medications. Patient denies any signs/symptoms of bleeding or clotting.  Patient instructed to continue with the current warfarin dosing regimen, and asked to follow up again in 2 weeks.     Josie GtzD

## 2021-02-01 LAB — INR PPP: 2.9 (ref 2–3.5)

## 2021-02-02 ENCOUNTER — ANTICOAGULATION MONITORING (OUTPATIENT)
Dept: VASCULAR LAB | Facility: MEDICAL CENTER | Age: 86
End: 2021-02-02

## 2021-02-02 DIAGNOSIS — Z79.01 CHRONIC ANTICOAGULATION: ICD-10-CM

## 2021-02-03 NOTE — PROGRESS NOTES
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 Responsible Family Medicine [phone]        Anticoagulation Patient Findings      This appointment was conducted over the phone.     HPI:   The reason for today's call is to prevent morbidity and mortality from a blood clot and/or stroke and to reduce the risk of bleeding while on a anticoagulant.     PCP:  DORI Gomez  [address] [facility] [address]    Assessment:     • INR  therapeutic.       Current Outpatient Medications:   •  metoprolol tartrate, TAKE 1 TABLET BY MOUTH TWO  TIMES DAILY  •  losartan, 50 mg, Oral, QDAY  •  warfarin, Take three tablets daily as directed by [facility]  •  nitroglycerin, place 1 tablet under the tongue if needed for chest pain  •  simvastatin, TAKE 1 TABLET BY MOUTH  EVERY MORNING  •  Capsaicin-Menthol (PAIN RELIEF EX), by Apply externally route.  •  SITagliptin, 100 mg, Oral, DAILY  •  levothyroxine, 50 mcg, Oral, QAM AC  •  Cholecalciferol (VITAMIN D PO), 1 Tab, Oral, QAM  •  ascorbic acid, 500 mg, Oral, QAM      Plan:     • Continue the same warfarin dose, as noted above.       Follow-up:      • Our protocol suggests we test in 2 weeks.        Additional information discussed with patient:     • Asked patient to please call the anticoagulation clinic if they have any signs/symptoms of bleeding and/or thrombosis or any changes to diet or medications.      National recommendations regarding anticoagulation therapy:     The CHEST guidelines recommends frequent INR monitoring at regular intervals (a few days up to a max of 12 weeks) to ensure patients are on the proper dose of warfarin, and patients are not having any complications from therapy.  INRs can dramatically change over a short time period due to diet, medications, and medical conditions.       Harrison Adames, PharmD, MS, BCACP, [facility]  Phone [phone] fax [phone]    This note was created using voice recognition software (Dragon). The accuracy of the dictation is limited by the abilities of the software. I have reviewed the note prior to signing, however some errors in grammar and context are still possible. If you have any questions related to this note please do not hesitate to contact our office.

## 2021-02-17 ENCOUNTER — ANTICOAGULATION MONITORING (OUTPATIENT)
Dept: VASCULAR LAB | Facility: MEDICAL CENTER | Age: 86
End: 2021-02-17

## 2021-02-17 DIAGNOSIS — Z79.01 CHRONIC ANTICOAGULATION: ICD-10-CM

## 2021-02-17 LAB — INR PPP: 3.1 (ref 2–3.5)

## 2021-02-19 NOTE — PROGRESS NOTES
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 Responsible [facility] [phone]        Anticoagulation Patient Findings      Left voicemail message to report a slightly supratherapeutic INR of 3.1.    INR is only 0.1 out of range, so will have pt to continue with current warfarin dosing regimen. Requested pt contact the clinic for any s/s of unusual bleeding, bruising, clotting or any changes to diet or medication.    FU INR in 2 week(s).    Rin Spence, PharmD

## 2021-02-25 ENCOUNTER — DOCUMENTATION (OUTPATIENT)
Dept: CARDIOLOGY | Facility: MEDICAL CENTER | Age: 86
End: 2021-02-25

## 2021-02-26 ENCOUNTER — OFFICE VISIT (OUTPATIENT)
Dept: CARDIOLOGY | Facility: MEDICAL CENTER | Age: 86
End: 2021-02-26
Payer: MEDICARE

## 2021-02-26 VITALS
HEIGHT: 65 IN | SYSTOLIC BLOOD PRESSURE: 160 MMHG | HEART RATE: 66 BPM | RESPIRATION RATE: 14 BRPM | WEIGHT: 180 LBS | DIASTOLIC BLOOD PRESSURE: 92 MMHG | OXYGEN SATURATION: 97 % | BODY MASS INDEX: 29.99 KG/M2

## 2021-02-26 DIAGNOSIS — R07.89 OTHER CHEST PAIN: ICD-10-CM

## 2021-02-26 DIAGNOSIS — E78.5 DYSLIPIDEMIA: ICD-10-CM

## 2021-02-26 DIAGNOSIS — I10 BENIGN ESSENTIAL HTN: Chronic | ICD-10-CM

## 2021-02-26 DIAGNOSIS — I25.10 CORONARY ARTERY DISEASE INVOLVING NATIVE CORONARY ARTERY OF NATIVE HEART WITHOUT ANGINA PECTORIS: Chronic | ICD-10-CM

## 2021-02-26 PROCEDURE — 99214 OFFICE O/P EST MOD 30 MIN: CPT | Performed by: INTERNAL MEDICINE

## 2021-02-26 RX ORDER — NITROGLYCERIN 0.4 MG/1
0.4 TABLET SUBLINGUAL PRN
Qty: 25 TABLET | Refills: 5 | Status: SHIPPED | OUTPATIENT
Start: 2021-02-26 | End: 2022-04-14 | Stop reason: SDUPTHER

## 2021-02-26 ASSESSMENT — ENCOUNTER SYMPTOMS
CARDIOVASCULAR NEGATIVE: 1
DEPRESSION: 0
NERVOUS/ANXIOUS: 0
COUGH: 0
GASTROINTESTINAL NEGATIVE: 1
CHILLS: 0
DOUBLE VISION: 0
MUSCULOSKELETAL NEGATIVE: 1
SHORTNESS OF BREATH: 0
RESPIRATORY NEGATIVE: 1
FEVER: 0
BRUISES/BLEEDS EASILY: 0
PALPITATIONS: 0
PSYCHIATRIC NEGATIVE: 1
WEAKNESS: 0
WEIGHT LOSS: 0
EYES NEGATIVE: 1
ABDOMINAL PAIN: 0
MYALGIAS: 0
BLURRED VISION: 0
CLAUDICATION: 0
CONSTITUTIONAL NEGATIVE: 1
FOCAL WEAKNESS: 0
HEADACHES: 0
VOMITING: 0
DIZZINESS: 0
NAUSEA: 0
NEUROLOGICAL NEGATIVE: 1

## 2021-03-05 ENCOUNTER — ANTICOAGULATION MONITORING (OUTPATIENT)
Dept: VASCULAR LAB | Facility: MEDICAL CENTER | Age: 86
End: 2021-03-05

## 2021-03-05 DIAGNOSIS — I82.409 ACUTE THROMBOEMBOLISM OF DEEP VEINS OF LOWER EXTREMITY, UNSPECIFIED LATERALITY (HCC): ICD-10-CM

## 2021-03-05 DIAGNOSIS — I26.99 PULMONARY EMBOLISM, UNSPECIFIED CHRONICITY, UNSPECIFIED PULMONARY EMBOLISM TYPE, UNSPECIFIED WHETHER ACUTE COR PULMONALE PRESENT (HCC): ICD-10-CM

## 2021-03-05 DIAGNOSIS — Z79.01 CHRONIC ANTICOAGULATION: ICD-10-CM

## 2021-03-05 LAB — INR PPP: 2.9 (ref 2–3.5)

## 2021-03-05 NOTE — PROGRESS NOTES
Attempted to reach patient regarding INR below.  INR   Date Value Ref Range Status   03/05/2021 2.90 2 - 3.5 Final     No results found for: POCINR      Phone picks up and immediately hungs up.  Will attempt to reach at a later time.  Min Blanc, PharmD, BCACP

## 2021-03-09 NOTE — PROGRESS NOTES
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 Responsible [facility] [phone]        Anticoagulation Patient Findings    Left a message on the patient's voicemail today, informing the patient of a therapeutic INR of 2.9.  Instructed the patient to call the clinic with any changes to diet or medications, with any signs/sx of bleeding or clotting or with any questions or concerns.     Pt is not on antiplatelet therapy.  Patient instructed to continue with the current warfarin dosing regimen, and asked to follow up again in 2 weeks.     Josie GtzD

## 2021-03-19 DIAGNOSIS — Z79.01 CHRONIC ANTICOAGULATION: Chronic | ICD-10-CM

## 2021-03-19 DIAGNOSIS — I25.10 CORONARY ARTERY DISEASE INVOLVING NATIVE CORONARY ARTERY OF NATIVE HEART WITHOUT ANGINA PECTORIS: Chronic | ICD-10-CM

## 2021-03-19 DIAGNOSIS — I10 BENIGN ESSENTIAL HTN: Chronic | ICD-10-CM

## 2021-03-19 RX ORDER — LEVOTHYROXINE SODIUM 0.05 MG/1
50 TABLET ORAL
Qty: 90 TABLET | Refills: 3 | OUTPATIENT
Start: 2021-03-19

## 2021-03-19 RX ORDER — WARFARIN SODIUM 1 MG/1
TABLET ORAL
Qty: 270 TABLET | Refills: 1 | Status: SHIPPED | OUTPATIENT
Start: 2021-03-19 | End: 2021-07-23

## 2021-03-22 DIAGNOSIS — E78.00 PURE HYPERCHOLESTEROLEMIA: Chronic | ICD-10-CM

## 2021-03-22 RX ORDER — SIMVASTATIN 40 MG
40 TABLET ORAL EVERY EVENING
Qty: 90 TABLET | Refills: 3 | Status: SHIPPED | OUTPATIENT
Start: 2021-03-22 | End: 2022-01-24

## 2021-03-31 ENCOUNTER — TELEPHONE (OUTPATIENT)
Dept: VASCULAR LAB | Facility: MEDICAL CENTER | Age: 86
End: 2021-03-31

## 2021-04-26 ENCOUNTER — TELEPHONE (OUTPATIENT)
Dept: VASCULAR LAB | Facility: MEDICAL CENTER | Age: 86
End: 2021-04-26

## 2021-04-26 NOTE — TELEPHONE ENCOUNTER
Nancy King  [phone] [facility] [address]    04/26/21      Dear Nancy King,    We have been unsuccessful in our attempts to contact you regarding your Anticoagulation Service appointments.  Warfarin is a potent blood-thinning agent that requires frequent monitoring to measure its level in the blood.  If your level becomes too high, you could develop serious, sometimes life-threatening bleeding problems.  If your level becomes too low, life-threatening blood clots or stroke could result.     The last recorded INR that we have on file for you is:  INR   Date Value Ref Range Status   03/05/2021 2.90 2 - 3.5 Final     No results found for: POCINR     To monitor your warfarin effectively, we need to be able to communicate with you.  This is a requirement to be followed by our Service.  If we are unable to contact you through repeated occasions, you are at risk of being discharged from the Anticoagulation Service.     It is extremely important that you have your lab work drawn as soon as possible.  Alternatively, you may schedule an appointment for a fingerstick INR at our clinic.  We are open Monday-Friday 8 am until 5 pm.  You may reach our Service at [phone].        Sincerely,           Doug Castorena, PharmD, [facility]  Clinic Supervisor  [facility]  Outpatient Anticoagulation Service

## 2021-04-27 LAB — INR PPP: 1.6 (ref 2–3.5)

## 2021-04-28 ENCOUNTER — ANTICOAGULATION MONITORING (OUTPATIENT)
Dept: VASCULAR LAB | Facility: MEDICAL CENTER | Age: 86
End: 2021-04-28

## 2021-04-28 DIAGNOSIS — I82.409 ACUTE THROMBOEMBOLISM OF DEEP VEINS OF LOWER EXTREMITY, UNSPECIFIED LATERALITY (HCC): ICD-10-CM

## 2021-04-28 DIAGNOSIS — Z79.01 CHRONIC ANTICOAGULATION: ICD-10-CM

## 2021-04-28 DIAGNOSIS — I26.99 PULMONARY EMBOLISM, UNSPECIFIED CHRONICITY, UNSPECIFIED PULMONARY EMBOLISM TYPE, UNSPECIFIED WHETHER ACUTE COR PULMONALE PRESENT (HCC): ICD-10-CM

## 2021-04-28 NOTE — PROGRESS NOTES
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 Responsible [facility] [phone]        Anticoagulation Patient Findings       Spoke with patient today regarding sub-therapeutic INR of 1.60.  Patient denies any signs/symptoms of bruising or bleeding or any changes in diet and medications.  Instructed patient to bolus todays dose to 5 mg and continue normal warfarin regimen on all other days. Let patient know to call clinic with any questions or concerns.    Follow up on , to reduce risk of adverse events related to this high risk medication,  Warfarin.    Discussed with Min Blanc, PharmD.   Carine Taylor - Student Pharmacist

## 2021-05-02 NOTE — PROGRESS NOTES
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 Responsible Family Medicine [phone]        Anticoagulation Patient Findings        Spoke to patient on the phone.   INR  therapeutic.   Denies signs/symptoms of bleeding and/or thrombosis.   Denies changes to diet or medications.   Follow up appointment in 4 week(s).    Continue weekly warfarin dose as noted    Harrison Adames, PharmD      
Attempted to reach pt regarding therapeutic INR on all phone numbers listed, unsuccessful.     Will try again later.     Humaira Berry, PharmD    
Unable to reach pt 10/2/19 @ 13:15    Doug Castorena, PharmD   
Self

## 2021-05-11 ENCOUNTER — ANTICOAGULATION MONITORING (OUTPATIENT)
Dept: VASCULAR LAB | Facility: MEDICAL CENTER | Age: 86
End: 2021-05-11

## 2021-05-11 DIAGNOSIS — I82.409 ACUTE THROMBOEMBOLISM OF DEEP VEINS OF LOWER EXTREMITY, UNSPECIFIED LATERALITY (HCC): ICD-10-CM

## 2021-05-11 DIAGNOSIS — I26.99 PULMONARY EMBOLISM, UNSPECIFIED CHRONICITY, UNSPECIFIED PULMONARY EMBOLISM TYPE, UNSPECIFIED WHETHER ACUTE COR PULMONALE PRESENT (HCC): ICD-10-CM

## 2021-05-11 DIAGNOSIS — Z79.01 CHRONIC ANTICOAGULATION: ICD-10-CM

## 2021-05-11 LAB — INR PPP: 3.9 (ref 2–3.5)

## 2021-05-11 NOTE — PROGRESS NOTES
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 Responsible [facility] [phone]        Anticoagulation Patient Findings    Spoke with the patient on the phone today, reporting a SUPRA-therapeutic INR of 3.9.   Confirmed the current warfarin dosing regimen and patient compliance.  Patient denies any interval changes to diet and/or medications. Patient denies any signs/symptoms of bleeding or clotting.  Patient instructed to HOLD warfarin TONIGHT ONLY, then to resume her current dosing regimen.   Patient asked to retest again in 1 week.     Josie Gong  PharmD

## 2021-06-22 ENCOUNTER — TELEPHONE (OUTPATIENT)
Dept: VASCULAR LAB | Facility: MEDICAL CENTER | Age: 86
End: 2021-06-22

## 2021-06-22 NOTE — LETTER
Nancy King  [phone] [facility] [address]    06/22/21    Dear Nancy King ,    We have been unsuccessful in our attempts to contact you regarding your Anticoagulation Service appointments. Warfarin is a potent blood-thinning agent that requires monitoring to ensure that the dosage is correct for your body.  If it isn't, you could develop serious, sometimes life-threatening bleeding problems or life-threatening blood clots or stroke could result.    To monitor you effectively, we need to be able to communicate with you.  This is a requirement to be followed by our Service.       If you repeatedly fail to keep your lab appointments, you are at risk of being discharged from the Anticoagulation Service.    It is extremely important that you contact the clinic as soon as possible to arrange appropriate follow up.  We are open Monday-Friday 8 am until 5 pm.  You may reach our Service at [phone].           Sincerely,           Doug Castorena, PharmD, [facility]  Clinic Supervisor  [facility]  Outpatient Anticoagulation Service

## 2021-06-22 NOTE — TELEPHONE ENCOUNTER
Unable to leavemessage for pt to have INR checked. Phone is out of service.  Letter sent  Denise Huddleston, Clinical Pharmacist, CDE, CACP

## 2021-07-07 ENCOUNTER — TELEPHONE (OUTPATIENT)
Dept: VASCULAR LAB | Facility: MEDICAL CENTER | Age: 86
End: 2021-07-07

## 2021-07-07 NOTE — LETTER
Nancy King  [phone] [facility] [address]    07/07/21    Dear Nancy King ,    We have been unsuccessful in our attempts to contact you regarding your Anticoagulation Service appointments. Warfarin is a potent blood-thinning agent that requires monitoring to ensure that the dosage is correct for your body.  If it isn't, you could develop serious, sometimes life-threatening bleeding problems or life-threatening blood clots or stroke could result.    To monitor you effectively, we need to be able to communicate with you.  This is a requirement to be followed by our Service.       If you repeatedly fail to keep your lab appointments, you are at risk of being discharged from the Anticoagulation Service.    It is extremely important that you contact the clinic as soon as possible to arrange appropriate follow up.  We are open Monday-Friday 8 am until 5 pm.  You may reach our Service at [phone].           Sincerely,           Doug Castorena, PharmD, [facility]  Clinic Supervisor  [facility]  Outpatient Anticoagulation Service

## 2021-07-07 NOTE — TELEPHONE ENCOUNTER
Left message for pt to have INR checked  2nd call  Letter sent  Denise Huddleston, Clinical Pharmacist, CDE, CACP

## 2021-07-22 DIAGNOSIS — Z79.01 CHRONIC ANTICOAGULATION: Chronic | ICD-10-CM

## 2021-07-23 RX ORDER — WARFARIN SODIUM 1 MG/1
TABLET ORAL
Qty: 270 TABLET | Refills: 0 | Status: SHIPPED | OUTPATIENT
Start: 2021-07-23 | End: 2021-10-26

## 2021-08-03 ENCOUNTER — ANTICOAGULATION MONITORING (OUTPATIENT)
Dept: VASCULAR LAB | Facility: MEDICAL CENTER | Age: 86
End: 2021-08-03

## 2021-08-03 DIAGNOSIS — Z79.01 CHRONIC ANTICOAGULATION: ICD-10-CM

## 2021-08-03 LAB — INR PPP: 3.7 (ref 2–3.5)

## 2021-08-03 NOTE — PROGRESS NOTES
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 Responsible [facility] [phone]          Refer to Anticoagulation Patient Findings for HPI  Patient Findings     Negatives:  Signs/symptoms of thrombosis, Signs/symptoms of bleeding, Laboratory test error suspected, Change in health, Change in alcohol use, Change in activity, Upcoming invasive procedure, Emergency department visit, Upcoming dental procedure, Missed doses, Extra doses, Change in medications, Change in diet/appetite, Hospital admission, Bruising, Other complaints          Spoke with patient's  to report a therapeutic INR.      Pt is NOT on antiplatelet therapy.    Pt instructed to hold warfarin dose tonight, then pt is to continue with current warfarin dosing regimen, confirms dosing.   Will follow up in 1 week(s).     Zachary Gundersen, Pharmacy Intern

## 2021-08-30 ENCOUNTER — DOCUMENTATION (OUTPATIENT)
Dept: VASCULAR LAB | Facility: MEDICAL CENTER | Age: 86
End: 2021-08-30
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
## 2021-09-01 ENCOUNTER — ANTICOAGULATION MONITORING (OUTPATIENT)
Dept: VASCULAR LAB | Facility: MEDICAL CENTER | Age: 86
End: 2021-09-01

## 2021-09-01 DIAGNOSIS — Z79.01 CHRONIC ANTICOAGULATION: ICD-10-CM

## 2021-09-01 LAB — INR PPP: 3.9 (ref 2–3.5)

## 2021-09-01 NOTE — PROGRESS NOTES
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 Responsible [facility] [phone]        Anticoagulation Patient Findings  Patient Findings     Negatives:  Signs/symptoms of thrombosis, Signs/symptoms of bleeding, Laboratory test error suspected, Change in health, Change in alcohol use, Change in activity, Upcoming invasive procedure, Emergency department visit, Upcoming dental procedure, Missed doses, Extra doses, Change in medications, Change in diet/appetite, Hospital admission, Bruising, Other complaints          INR supratherapeutic at 3.9.  Spoke w/ pt'  (Will) on phone.  Verified regimen w/ pt.  Instructed pt to hold today's warfarin dose x1 day, then reduce weekly regimen as stated above.  NO s/s bleeding reported per pt.  NO changes in diet reported per pt.  NO changes in medications reported per pt.  Check INR in 1 week(s).  Instructed pt to call clinic at [phone] if there are any questions.  Pt stated understanding.    Pt is not on antiplatelet therapy     Shanda Bañuelos, Pharmacy Intern.

## 2021-09-12 LAB — INR PPP: 1.9 (ref 2–3.5)

## 2021-09-14 ENCOUNTER — ANTICOAGULATION MONITORING (OUTPATIENT)
Dept: VASCULAR LAB | Facility: MEDICAL CENTER | Age: 86
End: 2021-09-14

## 2021-09-14 DIAGNOSIS — Z79.01 CHRONIC ANTICOAGULATION: ICD-10-CM

## 2021-09-14 NOTE — PROGRESS NOTES
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 Responsible [facility] [phone]        Anticoagulation Patient Findings  Patient Findings     Positives:  Extra doses (different regimen then what we have)    Negatives:  Signs/symptoms of thrombosis, Signs/symptoms of bleeding, Laboratory test error suspected, Change in health, Change in alcohol use, Change in activity, Upcoming invasive procedure, Emergency department visit, Upcoming dental procedure, Missed doses, Change in medications, Change in diet/appetite, Hospital admission, Bruising, Other complaints       Spoke with patient today regarding subtherapeutic INR of 1.9.  Patient denies any signs/symptoms of bruising or bleeding or any changes in diet and medications.  Instructed patient to call clinic with any questions or concerns.    Pt  states that she has been doing 2.5mg daily, different from what we had.      Instructed pt to take a bolus dose of 3 mg today then follow her regular regimen thereafter.     Follow up in 1 week, to reduce risk of adverse events related to this high risk medication,  Warfarin.    Dionisio Fox, Pharmacy Intern

## 2021-09-23 ENCOUNTER — ANTICOAGULATION MONITORING (OUTPATIENT)
Dept: VASCULAR LAB | Facility: MEDICAL CENTER | Age: 86
End: 2021-09-23

## 2021-09-23 DIAGNOSIS — Z79.01 CHRONIC ANTICOAGULATION: ICD-10-CM

## 2021-09-23 LAB — INR PPP: 2.4 (ref 2–3.5)

## 2021-09-24 NOTE — PROGRESS NOTES
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 Responsible [facility] [phone]        Anticoagulation Patient Findings    Voice message for patient regarding their anticoagulant.     HPI:   The reason for today's call is to prevent morbidity and mortality from a blood clot and/or stroke and to reduce the risk of bleeding while on a anticoagulant.     PCP:  DREA GomezNShaPSha  [address] [facility] [address]    Assessment:     • INR  therapeutic.       Current Outpatient Medications:   •  warfarin, TAKE 3 TABLETS BY MOUTH  DAILY AS DIRECTED BY [facility] ANTICOAGULATION SERVICES  •  simvastatin, 40 mg, Oral, Q EVENING  •  metoprolol tartrate, 25 mg, Oral, BID  •  nitroglycerin, 0.4 mg, Sublingual, PRN  •  losartan, 50 mg, Oral, QDAY  •  Capsaicin-Menthol (PAIN RELIEF EX), by Apply externally route.  •  SITagliptin, 100 mg, Oral, DAILY  •  levothyroxine, 50 mcg, Oral, QAM AC  •  Cholecalciferol (VITAMIN D PO), 1 Tablet, Oral, QAM  •  ascorbic acid, 500 mg, Oral, QAM      Plan:     • Continue the same warfarin dose, as noted above.       Follow-up:     • Our protocol suggests we test in 2  weeks.        Additional information discussed with patient:     • Asked patient to please call the anticoagulation clinic if they have any signs/symptoms of bleeding and/or thrombosis or any changes to diet or medications.      National recommendations regarding anticoagulation therapy:     The CHEST guidelines recommends frequent INR monitoring at regular intervals (a few days up to a max of 12 weeks) to ensure patients are on the proper dose of warfarin, and patients are not having any complications from therapy.  INRs can dramatically change over a short time period due to diet, medications, and medical conditions.       Harrison Adames, PharmD, MS, BCACP, [facility]  Phone [phone] fax [phone]    This note was created using voice recognition software (Dragon). The accuracy of the dictation is limited by the abilities of the software. I have reviewed the note prior to signing, however some errors in grammar and context are still possible. If you have any questions related to this note please do not hesitate to contact our office.

## 2021-10-15 ENCOUNTER — DOCUMENTATION (OUTPATIENT)
Dept: VASCULAR LAB | Facility: MEDICAL CENTER | Age: 86
End: 2021-10-15

## 2021-10-15 NOTE — PROGRESS NOTES
[facility] [place] [facility]      Pt is over due for PT/INR for warfarin monitoring. Left message for patient to have INR checked ASAP.     Clinic phone number left for any questions or concerns.    Josie Gong  PharmD

## 2021-10-16 LAB — INR PPP: 1.9 (ref 2–3.5)

## 2021-10-18 ENCOUNTER — ANTICOAGULATION MONITORING (OUTPATIENT)
Dept: MEDICAL GROUP | Facility: MEDICAL CENTER | Age: 86
End: 2021-10-18

## 2021-10-18 DIAGNOSIS — Z79.01 CHRONIC ANTICOAGULATION: ICD-10-CM

## 2021-10-18 NOTE — PROGRESS NOTES
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 Responsible [facility] [phone]        Anticoagulation Patient Findings      Left voicemail message to report a subtherapeutic INR of 1.9.    Will have pt take bolus dose of warfarin today of 3 mg and then   Pt to continue with current warfarin dosing regimen. Requested pt contact the clinic for any s/s of unusual bleeding, bruising, clotting or any changes to diet or medication.    FU INR in 2 week(s).    Rin Spence, PharmD

## 2021-10-23 DIAGNOSIS — Z79.01 CHRONIC ANTICOAGULATION: Chronic | ICD-10-CM

## 2021-10-26 RX ORDER — WARFARIN SODIUM 1 MG/1
TABLET ORAL
Qty: 225 TABLET | Refills: 1 | Status: SHIPPED | OUTPATIENT
Start: 2021-10-26 | End: 2022-03-11

## 2021-10-31 DIAGNOSIS — I10 ESSENTIAL HYPERTENSION: ICD-10-CM

## 2021-11-02 RX ORDER — LOSARTAN POTASSIUM 50 MG/1
TABLET ORAL
Qty: 90 TABLET | Refills: 3 | Status: SHIPPED | OUTPATIENT
Start: 2021-11-02 | End: 2022-03-29

## 2021-11-17 ENCOUNTER — ANTICOAGULATION MONITORING (OUTPATIENT)
Dept: VASCULAR LAB | Facility: MEDICAL CENTER | Age: 86
End: 2021-11-17

## 2021-11-17 DIAGNOSIS — Z79.01 CHRONIC ANTICOAGULATION: ICD-10-CM

## 2021-11-17 LAB — INR PPP: 2.1 (ref 2–3.5)

## 2021-11-17 NOTE — PROGRESS NOTES
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 Responsible [facility] [phone]          Refer to Anticoagulation Patient Findings for HPI  Patient Findings     Negatives:  Signs/symptoms of thrombosis, Signs/symptoms of bleeding, Laboratory test error suspected, Change in health, Change in alcohol use, Change in activity, Upcoming invasive procedure, Emergency department visit, Upcoming dental procedure, Missed doses, Extra doses, Change in medications, Change in diet/appetite, Hospital admission, Bruising, Other complaints          Spoke with patient's  to report a therapeutic INR.      Pt instructed to continue with current warfarin dosing regimen, confirms dosing.   Pt has been taking 3 mg daily    Will follow up in 2 week(s).     Rin Spence PharmD

## 2021-11-29 ENCOUNTER — ANTICOAGULATION MONITORING (OUTPATIENT)
Dept: VASCULAR LAB | Facility: MEDICAL CENTER | Age: 86
End: 2021-11-29

## 2021-11-29 DIAGNOSIS — Z79.01 CHRONIC ANTICOAGULATION: ICD-10-CM

## 2021-11-29 LAB — INR PPP: 2.4 (ref 2–3.5)

## 2021-11-30 NOTE — PROGRESS NOTES
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 Responsible [facility] [phone]        Anticoagulation Patient Findings          HPI:   The reason for today's call is to prevent morbidity and mortality from a blood clot and/or stroke and to reduce the risk of bleeding while on a anticoagulant.     PCP:  DORI Gomez  [address] [facility] [address]    Assessment:     • INR  therapeutic.       Current Outpatient Medications:   •  losartan, TAKE 1 TABLET BY MOUTH  DAILY  •  warfarin, TAKE 2 & 1/2 TABLETS BY MOUTH  DAILY AS DIRECTED BY [facility] ANTICOAGULATION SERVICES  •  simvastatin, 40 mg, Oral, Q EVENING  •  metoprolol tartrate, 25 mg, Oral, BID  •  nitroglycerin, 0.4 mg, Sublingual, PRN  •  Capsaicin-Menthol (PAIN RELIEF EX), by Apply externally route.  •  SITagliptin, 100 mg, Oral, DAILY  •  levothyroxine, 50 mcg, Oral, QAM AC  •  Cholecalciferol (VITAMIN D PO), 1 Tablet, Oral, QAM  •  ascorbic acid, 500 mg, Oral, QAM      Plan:     • Continue the same warfarin dose, as noted above.       Follow-up:     • Our protocol suggests we test in 2 weeks.        Additional information  discussed with patient:     • Asked patient to please call the anticoagulation clinic if they have any signs/symptoms of bleeding and/or thrombosis or any changes to diet or medications.      National recommendations regarding anticoagulation therapy:     The CHEST guidelines recommends frequent INR monitoring at regular intervals (a few days up to a max of 12 weeks) to ensure patients are on the proper dose of warfarin, and patients are not having any complications from therapy.  INRs can dramatically change over a short time period due to diet, medications, and medical conditions.     [facility]  Phone [phone] fax [phone]    This note was created using voice recognition software (Dragon). The accuracy of the dictation is limited by the abilities of the software. I have reviewed the note prior to signing, however some errors in grammar and context are still possible. If you have any questions related to this note please do not hesitate to contact our office.

## 2022-01-10 LAB — INR PPP: 1.8 (ref 2–3.5)

## 2022-01-12 ENCOUNTER — ANTICOAGULATION MONITORING (OUTPATIENT)
Dept: VASCULAR LAB | Facility: MEDICAL CENTER | Age: 87
End: 2022-01-12

## 2022-01-12 DIAGNOSIS — Z79.01 CHRONIC ANTICOAGULATION: ICD-10-CM

## 2022-01-12 NOTE — PROGRESS NOTES
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 Responsible [facility] [phone]          Refer to Anticoagulation Patient Findings for HPI  Patient Findings     Positives:  Missed doses (pt states she has been taking 2 mg daily)    Negatives:  Signs/symptoms of thrombosis, Signs/symptoms of bleeding, Laboratory test error suspected, Change in health, Change in alcohol use, Change in activity, Upcoming invasive procedure, Emergency department visit, Upcoming dental procedure, Extra doses, Change in medications, Change in diet/appetite, Hospital admission, Bruising, Other complaints          Spoke with pt.  INR is subtherapeutic.     Pt verifies warfarin weekly dosing.    Will have pt increase regimen to 2.5 mg daily    Repeat INR in 2 week(s).     Rin Bradford, Jeri

## 2022-01-21 DIAGNOSIS — E78.00 PURE HYPERCHOLESTEROLEMIA: Chronic | ICD-10-CM

## 2022-01-24 RX ORDER — SIMVASTATIN 40 MG
40 TABLET ORAL EVERY EVENING
Qty: 90 TABLET | Refills: 0 | Status: SHIPPED | OUTPATIENT
Start: 2022-01-24 | End: 2022-04-11

## 2022-02-14 ENCOUNTER — DOCUMENTATION (OUTPATIENT)
Dept: VASCULAR LAB | Facility: MEDICAL CENTER | Age: 87
End: 2022-02-14

## 2022-02-15 ENCOUNTER — ANTICOAGULATION MONITORING (OUTPATIENT)
Dept: VASCULAR LAB | Facility: MEDICAL CENTER | Age: 87
End: 2022-02-15

## 2022-02-15 DIAGNOSIS — Z79.01 CHRONIC ANTICOAGULATION: ICD-10-CM

## 2022-02-15 LAB — INR PPP: 1.2 (ref 2–3.5)

## 2022-02-15 NOTE — PROGRESS NOTES
[facility] [place] [facility]      Pt is over due for PT/INR for warfarin monitoring. Left message for patient to have INR checked ASAP.     Clinic phone number left for any questions or concerns.    Josie Gong  PharmD

## 2022-02-16 NOTE — PROGRESS NOTES
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 Responsible [facility] [phone]        Anticoagulation Patient Findings          Spoke with Will to report a sub therapeutic INR of 1.2  Pt missed one or more doses in the past week.  PT has been taking 3mg po daily.  Will BOLUS dose with 5mg po qhs x3 then resume 21mg/week  Follow up in 1 weeks, to reduce the risk of adverse events related to this high risk medication, warfarin.    Denise Huddleston, Clinical Pharmacist

## 2022-02-22 ENCOUNTER — ANTICOAGULATION MONITORING (OUTPATIENT)
Dept: VASCULAR LAB | Facility: MEDICAL CENTER | Age: 87
End: 2022-02-22
Payer: MEDICARE

## 2022-02-22 DIAGNOSIS — I82.409 ACUTE THROMBOEMBOLISM OF DEEP VEINS OF LOWER EXTREMITY, UNSPECIFIED LATERALITY (HCC): ICD-10-CM

## 2022-02-22 DIAGNOSIS — Z79.01 CHRONIC ANTICOAGULATION: ICD-10-CM

## 2022-02-22 DIAGNOSIS — I26.99 PULMONARY EMBOLISM, UNSPECIFIED CHRONICITY, UNSPECIFIED PULMONARY EMBOLISM TYPE, UNSPECIFIED WHETHER ACUTE COR PULMONALE PRESENT (HCC): ICD-10-CM

## 2022-02-22 LAB — INR PPP: 2.4 (ref 2–3.5)

## 2022-02-22 NOTE — LETTER
Nancy King  [phone] [facility] [address]    02/24/22    Dear Nancy King ,    We have been unsuccessful in our attempts to contact you regarding your Anticoagulation Service appointments. Warfarin is a potent blood-thinning agent that requires monitoring to ensure that the dosage is correct for your body.  If it isn't, you could develop serious, sometimes life-threatening bleeding problems or life-threatening blood clots or stroke could result.    To monitor you effectively, we need to be able to communicate with you.  This is a requirement to be followed by our Service.  ALL OF YOUR PHONES ARE OUT OF SERVICE.  PLEASE CALL US      If you repeatedly fail to keep your lab appointments, you are at risk of being discharged from the Anticoagulation Service.    It is extremely important that you contact the clinic as soon as possible to arrange appropriate follow up.  We are open Monday-Friday 8 am until 5 pm.  You may reach our Service at [phone].           Sincerely,           Doug Castorena, PharmD, [facility]  Clinic Supervisor  [facility]  Outpatient Anticoagulation Service

## 2022-02-23 NOTE — PROGRESS NOTES
Attempted to call the patient today, someone picks up, but no answer and then hangs up. Called multiple times.  Will try again later.     2/22/22 5:24pm

## 2022-02-24 NOTE — PROGRESS NOTES
Attempted to call pt once more - no answer. Will f/u at a later time.    Audie Zuniga, TresaD, BCACP

## 2022-02-25 NOTE — PROGRESS NOTES
Unable to reach patient or any/all of her emergency contacts.  VM are full, phone is not accepting calls, etc.  Letter sent for patient to contact us.  Denise Huddleston, Clinical Pharmacist, CDE, CACP

## 2022-03-04 ENCOUNTER — ANTICOAGULATION MONITORING (OUTPATIENT)
Dept: VASCULAR LAB | Facility: MEDICAL CENTER | Age: 87
End: 2022-03-04
Payer: MEDICARE

## 2022-03-04 DIAGNOSIS — I26.99 PULMONARY EMBOLISM, UNSPECIFIED CHRONICITY, UNSPECIFIED PULMONARY EMBOLISM TYPE, UNSPECIFIED WHETHER ACUTE COR PULMONALE PRESENT (HCC): ICD-10-CM

## 2022-03-04 DIAGNOSIS — Z79.01 CHRONIC ANTICOAGULATION: ICD-10-CM

## 2022-03-04 DIAGNOSIS — I82.409 ACUTE THROMBOEMBOLISM OF DEEP VEINS OF LOWER EXTREMITY, UNSPECIFIED LATERALITY (HCC): ICD-10-CM

## 2022-03-04 LAB — INR PPP: 1.4 (ref 2–3.5)

## 2022-03-04 NOTE — PROGRESS NOTES
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 Responsible [facility] [phone]        Anticoagulation Patient Findings          Left voicemail message to report a sub therapeutic INR of 1.4.  Pt to continue with current warfarin dosing regimen. Requested pt contact the clinic for any s/s of unusual bleeding, bruising, clotting or any changes to diet or medication.  Follow up in 1 weeks, to reduce the risk of adverse events related to this high risk medication, warfarin.    Denise Huddleston, Clinical Pharmacist

## 2022-03-10 DIAGNOSIS — I10 BENIGN ESSENTIAL HTN: Chronic | ICD-10-CM

## 2022-03-10 DIAGNOSIS — Z79.01 CHRONIC ANTICOAGULATION: Chronic | ICD-10-CM

## 2022-03-10 DIAGNOSIS — I25.10 CORONARY ARTERY DISEASE INVOLVING NATIVE CORONARY ARTERY OF NATIVE HEART WITHOUT ANGINA PECTORIS: Chronic | ICD-10-CM

## 2022-03-11 RX ORDER — WARFARIN SODIUM 1 MG/1
TABLET ORAL
Qty: 225 TABLET | Refills: 3 | Status: ON HOLD | OUTPATIENT
Start: 2022-03-11 | End: 2023-07-17

## 2022-03-24 ENCOUNTER — TELEPHONE (OUTPATIENT)
Dept: VASCULAR LAB | Facility: MEDICAL CENTER | Age: 87
End: 2022-03-24
Payer: MEDICARE

## 2022-03-29 ENCOUNTER — OFFICE VISIT (OUTPATIENT)
Dept: CARDIOLOGY | Facility: MEDICAL CENTER | Age: 87
End: 2022-03-29
Payer: MEDICARE

## 2022-03-29 VITALS
SYSTOLIC BLOOD PRESSURE: 190 MMHG | BODY MASS INDEX: 29.66 KG/M2 | OXYGEN SATURATION: 95 % | HEART RATE: 62 BPM | RESPIRATION RATE: 16 BRPM | HEIGHT: 65 IN | DIASTOLIC BLOOD PRESSURE: 82 MMHG | WEIGHT: 178 LBS

## 2022-03-29 DIAGNOSIS — I10 BENIGN ESSENTIAL HTN: Chronic | ICD-10-CM

## 2022-03-29 DIAGNOSIS — I25.10 CORONARY ARTERY DISEASE INVOLVING NATIVE CORONARY ARTERY OF NATIVE HEART WITHOUT ANGINA PECTORIS: Chronic | ICD-10-CM

## 2022-03-29 DIAGNOSIS — R07.89 OTHER CHEST PAIN: ICD-10-CM

## 2022-03-29 PROCEDURE — 99214 OFFICE O/P EST MOD 30 MIN: CPT | Performed by: INTERNAL MEDICINE

## 2022-03-29 RX ORDER — LOSARTAN POTASSIUM 100 MG/1
100 TABLET ORAL DAILY
Qty: 90 TABLET | Refills: 3 | Status: SHIPPED | OUTPATIENT
Start: 2022-03-29 | End: 2022-11-28 | Stop reason: SDUPTHER

## 2022-03-29 ASSESSMENT — ENCOUNTER SYMPTOMS
DIZZINESS: 0
CARDIOVASCULAR NEGATIVE: 1
PALPITATIONS: 0
MUSCULOSKELETAL NEGATIVE: 1
SHORTNESS OF BREATH: 0
ABDOMINAL PAIN: 0
WEAKNESS: 0
NERVOUS/ANXIOUS: 0
VOMITING: 0
CLAUDICATION: 0
DEPRESSION: 0
FEVER: 0
HEADACHES: 0
CONSTITUTIONAL NEGATIVE: 1
DOUBLE VISION: 0
FOCAL WEAKNESS: 0
PSYCHIATRIC NEGATIVE: 1
MYALGIAS: 0
BRUISES/BLEEDS EASILY: 0
COUGH: 0
EYES NEGATIVE: 1
BLURRED VISION: 0
NEUROLOGICAL NEGATIVE: 1
NAUSEA: 0
CHILLS: 0
GASTROINTESTINAL NEGATIVE: 1
RESPIRATORY NEGATIVE: 1
WEIGHT LOSS: 0

## 2022-03-29 NOTE — PROGRESS NOTES
"Chief Complaint   Patient presents with   • Pulmonary Embolism   • Coronary Artery Disease   • Anticoagulation     F/V Dx: Chronic anticoagulation       Subjective     Nancy King is a 86 y.o. female who presents today for annual follow up of chest pain    Since the patient's last visit on 02/26/21, she has been doing well clinically until her chest pain began to increase. She has had 3 episodes in the past 10 days. She describes her chest pain to be moderate to severe chest pressure sensation of her mid chest without radiation, lasting up 5 minutes hours days. She denies associated shortness of breath, palpitations, diaphoresis, nausea and vomiting. Her pain is aggravated by emotional stress and is alleviated with nitroglyrine.    Past Medical History:   Diagnosis Date   • Acute venous embolism and thrombosis of unspecified deep vessels of lower extremity    • Angina pectoris 5/11/2012    \"with stress\"   • Benign essential HTN 5/11/2012   • CAD (coronary artery disease) 5/11/2012   • Cancer (HCC)     right breast   • Chronic anticoagulation 5/11/2012   • Heart burn    • Hyperlipidemia 5/11/2012   • Hyperlipoproteinemia 5/11/2012   • Indigestion    • Obesity 5/11/2012   • Other specified disorder of intestines     diarrhea   • Pulmonary embolism (HCC)    • Unspecified cataract     removed bilat   • Unspecified disorder of thyroid    • Unspecified urinary incontinence      Past Surgical History:   Procedure Laterality Date   • MASTECTOMY  1/12/2015    Performed by Dwight Birch M.D. at SURGERY [facility]   • NODE BIOPSY SENTINEL  1/12/2015    Performed by Dwight Birch M.D. at SURGERY [facility]   • AXILLARY NODE DISSECTION  1/12/2015    Performed by Dwight Birch M.D. at SURGERY [facility]   • APPENDECTOMY     • CHOLECYSTECTOMY     • HYSTERECTOMY, TOTAL ABDOMINAL     • OTHER      Bladder   • TONSILLECTOMY AND ADENOIDECTOMY       Family History   Problem Relation Age of Onset   • Heart " "Disease Mother    • Heart Disease Father      Social History     Socioeconomic History   • Marital status:      Spouse name: Not on file   • Number of children: Not on file   • Years of education: Not on file   • Highest education level: Not on file   Occupational History   • Not on file   Tobacco Use   • Smoking status: Never Smoker   • Smokeless tobacco: Never Used   Vaping Use   • Vaping Use: Never used   Substance and Sexual Activity   • Alcohol use: Yes     Comment: \"Couple drinks every night\"   • Drug use: No   • Sexual activity: Not on file   Other Topics Concern   • Not on file   Social History Narrative   • Not on file     Social Determinants of Health     Financial Resource Strain: Not on file   Food Insecurity: Not on file   Transportation Needs: Not on file   Physical Activity: Not on file   Stress: Not on file   Social Connections: Not on file   Intimate Partner Violence: Not on file   Housing Stability: Not on file     Allergies   Allergen Reactions   • Sulfa Drugs      Swelling   • Levaquin      Rash     (Medications reviewed.)  Outpatient Encounter Medications as of 3/29/2022   Medication Sig Dispense Refill   • metoprolol tartrate (LOPRESSOR) 25 MG Tab TAKE 1 TABLET BY MOUTH  TWICE DAILY 180 Tablet 0   • warfarin (COUMADIN) 1 MG Tab TAKE 2 AND 1/2 TABLETS BY  MOUTH DAILY [facility]  [facility] 225 Tablet 3   • simvastatin (ZOCOR) 40 MG Tab Take 1 Tablet by mouth every evening. MAKE APPT/ GET LAB WORK 90 Tablet 0   • losartan (COZAAR) 50 MG Tab TAKE 1 TABLET BY MOUTH  DAILY 90 Tablet 3   • nitroglycerin (NITROSTAT) 0.4 MG SL Tab Place 1 tablet under the tongue as needed for Chest Pain. 25 tablet 5   • Capsaicin-Menthol (PAIN RELIEF EX) Apply  topically as needed.     • SITagliptin (JANUVIA) 100 MG Tab Take 100 mg by mouth every day.     • levothyroxine (SYNTHROID) 50 MCG TABS Take 50 mcg by mouth every morning before breakfast.     • [DISCONTINUED] Cholecalciferol " "(VITAMIN D PO) Take 1 Tab by mouth every morning. (Patient not taking: Reported on 3/29/2022)     • [DISCONTINUED] ascorbic acid (ASCORBIC ACID) 500 MG TABS Take 500 mg by mouth every morning. (Patient not taking: Reported on 3/29/2022)       No facility-administered encounter medications on file as of 3/29/2022.     Review of Systems   Constitutional: Negative.  Negative for chills, fever, malaise/fatigue and weight loss.   HENT: Negative.  Negative for hearing loss.    Eyes: Negative.  Negative for blurred vision and double vision.   Respiratory: Negative.  Negative for cough and shortness of breath.    Cardiovascular: Negative.  Negative for chest pain, palpitations, claudication and leg swelling.   Gastrointestinal: Negative.  Negative for abdominal pain, nausea and vomiting.   Genitourinary: Negative.  Negative for dysuria and urgency.   Musculoskeletal: Negative.  Negative for joint pain and myalgias.   Skin: Negative.  Negative for itching and rash.   Neurological: Negative.  Negative for dizziness, focal weakness, weakness and headaches.   Endo/Heme/Allergies: Negative.  Does not bruise/bleed easily.   Psychiatric/Behavioral: Negative.  Negative for depression. The patient is not nervous/anxious.               Objective     BP (!) 190/78 (BP Location: Left arm, Patient Position: Sitting, BP Cuff Size: Adult)   Pulse 62   Resp 16   Ht 1.651 m (5' 5\")   Wt 80.7 kg (178 lb)   SpO2 95%   BMI 29.62 kg/m²     Physical Exam  Constitutional:       Appearance: She is well-developed.   HENT:      Head: Normocephalic and atraumatic.   Neck:      Vascular: No JVD.   Cardiovascular:      Rate and Rhythm: Normal rate and regular rhythm.      Heart sounds: Normal heart sounds.   Pulmonary:      Effort: Pulmonary effort is normal.      Breath sounds: Normal breath sounds.   Abdominal:      General: Bowel sounds are normal.      Palpations: Abdomen is soft.      Comments: No hepatosplenomegaly.   Musculoskeletal:       "   General: Normal range of motion.   Lymphadenopathy:      Cervical: No cervical adenopathy.   Skin:     General: Skin is warm and dry.   Neurological:      Mental Status: She is alert and oriented to person, place, and time.              CARDIAC STUDIES/PROCEDURES:     CTA OF CHEST (01/30/15)  1.  Extensive pulmonary thromboembolism involving majority of right pulmonary arteries and significant portions of left pulmonary arteries  2.  No other significant finding  3.  Right breast postoperative changes  4.  Aortic atherosclerotic plaque  5.  Infiltration of liver     ECHOCARDIOGRAM CONCLUSIONS (06/11/18)  Prior echo done 02/01/15. Compared to the report of the study done -   there has been no significant change.   Normal left ventricular systolic function.  Left ventricular ejection fraction is visually estimated to be 60%.  Grade I diastolic dysfunction.  Mild mitral regurgitation.  Mild tricuspid regurgitation.  Estimated right ventricular systolic pressure is 25 mmHg.     ECHOCARDIOGRAM CONCLUSIONS (02/01/15)  No prior study is available for comparison.   Normal left ventricular size and function. Grade I diastolic   dysfunction - mitral inflow E/A is <1.0. Left ventricular ejection   fraction is 60% to 65%. Mild concentric left ventricular hypertrophy.  No significant valve abnormalities.   Right ventricular systolic pressure is estimated to be 45-50 mmHg.  Normal pericardium without effusion.     EKG performed on (05/24/18) EKG shows sinus rhythm with diffuse ST abnormalities.     Laboratory results of (06/15/18) Cholesterol profile of 172/147/63/80 noted.  Laboratory results of (02/05/15) Cholesterol profile of 129/144/34/66 noted.     LOWER EXTREMITY VENOUS ULTRASOUND (01/30/15)  1.  Normal right lower extremity superficial and deep venous examination.   2.  Left lower extremity deep and superficial venous thrombosis.   3.  Left popliteal cyst.     MYOCARDIAL PERFUSION STUDY CONCLUSIONS (06/15/18)  Very  small reversible defect is noted at the apical cap in the stress images, concerning for ischemia. Artifact cannot be ruled out.  Normal left ventricular wall motion, with EF of 75%.   ECG INTERPRETATION  Negative stress ECG for ischemia.    Assessment & Plan     1. Other chest pain     2. Coronary artery disease involving native coronary artery of native heart without angina pectoris     3. Benign essential HTN         Medical Decision Making: Today's Assessment/Status/Plan:          1. Chest pain (coronary artery disease of left anterior descending artery diagnosed in [place], [place] 1984): She is experiencing chest pain as described above. We will perform a myocardial perfusion imaging study and an echocardiogram and follow up with her.   2. Hypertension: Blood pressure has been high. We will increase losartan to 100 mg, continue with metoprolol 25 mg BID and reassess the blood pressure.  3. Hyperlipidemia: She is doing well on statin therapy without myalgia symptoms.  4. Pulmonary embolism with deep venous thrombosis on chronic anticoagulation therapy (warfarin): She is clinically doing well without recurrence or chest pain or shortness of breath. She has chronic edema of her left lower extremity.     We will follow up in 3 months.     CC Dee Saldana

## 2022-04-08 DIAGNOSIS — E78.00 PURE HYPERCHOLESTEROLEMIA: Chronic | ICD-10-CM

## 2022-04-11 RX ORDER — SIMVASTATIN 40 MG
40 TABLET ORAL NIGHTLY
Qty: 90 TABLET | Refills: 3 | Status: SHIPPED | OUTPATIENT
Start: 2022-04-11 | End: 2023-03-07

## 2022-04-14 ENCOUNTER — TELEPHONE (OUTPATIENT)
Dept: VASCULAR LAB | Facility: MEDICAL CENTER | Age: 87
End: 2022-04-14
Payer: MEDICARE

## 2022-04-14 ENCOUNTER — OFFICE VISIT (OUTPATIENT)
Dept: CARDIOLOGY | Facility: MEDICAL CENTER | Age: 87
End: 2022-04-14
Payer: MEDICARE

## 2022-04-14 ENCOUNTER — HOSPITAL ENCOUNTER (OUTPATIENT)
Dept: RADIOLOGY | Facility: MEDICAL CENTER | Age: 87
End: 2022-04-14
Attending: INTERNAL MEDICINE
Payer: MEDICARE

## 2022-04-14 VITALS
OXYGEN SATURATION: 97 % | RESPIRATION RATE: 16 BRPM | BODY MASS INDEX: 29.66 KG/M2 | HEART RATE: 56 BPM | DIASTOLIC BLOOD PRESSURE: 58 MMHG | SYSTOLIC BLOOD PRESSURE: 140 MMHG | HEIGHT: 65 IN | WEIGHT: 178 LBS

## 2022-04-14 DIAGNOSIS — I10 BENIGN ESSENTIAL HTN: Chronic | ICD-10-CM

## 2022-04-14 DIAGNOSIS — I82.409 ACUTE THROMBOEMBOLISM OF DEEP VEINS OF LOWER EXTREMITY, UNSPECIFIED LATERALITY (HCC): Chronic | ICD-10-CM

## 2022-04-14 DIAGNOSIS — I20.89 ANGINA AT REST (HCC): ICD-10-CM

## 2022-04-14 DIAGNOSIS — E78.5 DYSLIPIDEMIA: ICD-10-CM

## 2022-04-14 DIAGNOSIS — R07.89 OTHER CHEST PAIN: ICD-10-CM

## 2022-04-14 DIAGNOSIS — I25.10 CORONARY ARTERY DISEASE INVOLVING NATIVE CORONARY ARTERY OF NATIVE HEART WITHOUT ANGINA PECTORIS: Chronic | ICD-10-CM

## 2022-04-14 DIAGNOSIS — E78.5 HYPERLIPOPROTEINEMIA: Chronic | ICD-10-CM

## 2022-04-14 DIAGNOSIS — I26.99 PULMONARY EMBOLISM, UNSPECIFIED CHRONICITY, UNSPECIFIED PULMONARY EMBOLISM TYPE, UNSPECIFIED WHETHER ACUTE COR PULMONALE PRESENT (HCC): Chronic | ICD-10-CM

## 2022-04-14 DIAGNOSIS — Z79.01 CHRONIC ANTICOAGULATION: Chronic | ICD-10-CM

## 2022-04-14 PROCEDURE — 99214 OFFICE O/P EST MOD 30 MIN: CPT | Performed by: NURSE PRACTITIONER

## 2022-04-14 RX ORDER — LEVOTHYROXINE SODIUM 0.05 MG/1
50 TABLET ORAL
Qty: 90 TABLET | Refills: 0 | Status: SHIPPED | OUTPATIENT
Start: 2022-04-14 | End: 2023-07-12

## 2022-04-14 RX ORDER — NITROGLYCERIN 0.4 MG/1
0.4 TABLET SUBLINGUAL PRN
Qty: 25 TABLET | Refills: 5 | Status: SHIPPED | OUTPATIENT
Start: 2022-04-14 | End: 2022-07-24

## 2022-04-14 ASSESSMENT — ENCOUNTER SYMPTOMS
SHORTNESS OF BREATH: 1
PALPITATIONS: 0
ORTHOPNEA: 0
CLAUDICATION: 0
ABDOMINAL PAIN: 0
FEVER: 0
COUGH: 0
MYALGIAS: 0
PND: 0

## 2022-04-14 NOTE — LETTER
Nancy King  [phone] [facility] [address]    04/14/22    Dear Nancy King ,    We have been unsuccessful in our attempts to contact you regarding your Anticoagulation Service appointments. Warfarin is a potent blood-thinning agent that requires monitoring to ensure that the dosage is correct for your body.  If it isn't, you could develop serious, sometimes life-threatening bleeding problems or life-threatening blood clots or stroke could result.    To monitor you effectively, we need to be able to communicate with you.  This is a requirement to be followed by our Service.       If you repeatedly fail to keep your lab appointments, you are at risk of being discharged from the Anticoagulation Service.    It is extremely important that you contact the clinic as soon as possible to arrange appropriate follow up.  We are open Monday-Friday 8 am until 5 pm.  You may reach our Service at [phone].           Sincerely,           Doug Castorena, PharmD, [facility]  Clinic Supervisor  [facility]  Outpatient Anticoagulation Service

## 2022-04-14 NOTE — PATIENT INSTRUCTIONS
BP goal <130/80 consistently. Keep a log.  Take daily 2 hours after AM medications. Call on Monday with BP log.    We made changes to your BP medications:  Increase losartan to 100 mg once a day.  Change metoprolol to 25 mg morning and night.  Keep all the other medications the same.    I have refilled your januvia, levothyroxine, losartan, and metoprolol to [facility].

## 2022-04-14 NOTE — TELEPHONE ENCOUNTER
Left message for pt to have INR checked  2nd call  Letter sent  Dneise Huddleston, Clinical Pharmacist, CDE, CACP

## 2022-04-14 NOTE — PROCEDURES
Patient arrived for chemical cardiac stress test.  Patient reports that she had a cup of coffee with coffee cake this morning.  Unable to proceed with chemical cardiac stress test.  Patient given prep instructions and the telephone number for scheduling to reschedule her appointment.

## 2022-04-15 ENCOUNTER — TELEPHONE (OUTPATIENT)
Dept: CARDIOLOGY | Facility: MEDICAL CENTER | Age: 87
End: 2022-04-15
Payer: MEDICARE

## 2022-04-15 NOTE — TELEPHONE ENCOUNTER
----- Message from TROY Deluca sent at 4/14/2022  2:15 PM PDT -----  Regarding: call patient tomorrow  Will you call this lady tomorrow and check on her angina and BP readings?    I am worried about her.     Thanks    Cynthia    ---------------------------------------------------------    Attempted to call pt on both numbers on file. Called  [phone] (H) x3 and it rings once and then goes silent. Called [phone] (W) and there is no answer and no voicemail.     Will try again later.

## 2022-04-15 NOTE — TELEPHONE ENCOUNTER
Called [phone] (H) and again there was one ring and then silence.     Tried 's mobile # [phone], no answer and no voicemail.

## 2022-05-07 LAB — INR PPP: 2.8 (ref 2–3.5)

## 2022-05-11 ENCOUNTER — HOSPITAL ENCOUNTER (OUTPATIENT)
Dept: RADIOLOGY | Facility: MEDICAL CENTER | Age: 87
End: 2022-05-11
Attending: NURSE PRACTITIONER
Payer: MEDICARE

## 2022-05-11 DIAGNOSIS — I25.10 CORONARY ARTERY DISEASE INVOLVING NATIVE CORONARY ARTERY OF NATIVE HEART WITHOUT ANGINA PECTORIS: Chronic | ICD-10-CM

## 2022-05-11 DIAGNOSIS — I20.89 ANGINA AT REST (HCC): ICD-10-CM

## 2022-05-11 PROCEDURE — A9502 TC99M TETROFOSMIN: HCPCS

## 2022-05-11 PROCEDURE — 93018 CV STRESS TEST I&R ONLY: CPT | Performed by: INTERNAL MEDICINE

## 2022-05-11 PROCEDURE — 78452 HT MUSCLE IMAGE SPECT MULT: CPT | Mod: 26 | Performed by: INTERNAL MEDICINE

## 2022-05-11 PROCEDURE — 700111 HCHG RX REV CODE 636 W/ 250 OVERRIDE (IP)

## 2022-05-11 RX ORDER — AMINOPHYLLINE 25 MG/ML
100 INJECTION, SOLUTION INTRAVENOUS
Status: DISCONTINUED | OUTPATIENT
Start: 2022-05-11 | End: 2022-05-12 | Stop reason: HOSPADM

## 2022-05-11 RX ORDER — REGADENOSON 0.08 MG/ML
0.4 INJECTION, SOLUTION INTRAVENOUS ONCE
Status: COMPLETED | OUTPATIENT
Start: 2022-05-11 | End: 2022-05-11

## 2022-05-11 RX ORDER — REGADENOSON 0.08 MG/ML
INJECTION, SOLUTION INTRAVENOUS
Status: COMPLETED
Start: 2022-05-11 | End: 2022-05-11

## 2022-05-11 RX ADMIN — REGADENOSON 0.4 MG: 0.08 INJECTION, SOLUTION INTRAVENOUS at 14:32

## 2022-05-12 ENCOUNTER — TELEPHONE (OUTPATIENT)
Dept: CARDIOLOGY | Facility: MEDICAL CENTER | Age: 87
End: 2022-05-12
Payer: MEDICARE

## 2022-05-12 ENCOUNTER — TELEPHONE (OUTPATIENT)
Dept: VASCULAR LAB | Facility: MEDICAL CENTER | Age: 87
End: 2022-05-12
Payer: MEDICARE

## 2022-05-12 NOTE — TELEPHONE ENCOUNTER
Left message for pt to have INR checked  3rd call  2nd letter sent  INR   Date Value Ref Range Status   03/04/2022 1.40 (A) 2 - 3.5 Final     No results found for: OSEI Huddleston, Clinical Pharmacist, CDE, CACP

## 2022-05-12 NOTE — TELEPHONE ENCOUNTER
"Attempted to reach patient multiple times. Call answered and disconnected as soon as RN asked to speak with patient. Attempted to reach patient on spouses number, but was told \"I'm not sure if you have the wrong number or not\" and call was disconnected. Letter generated with results advising patient to contact our office ASAP or report to the ER if symptoms worsening.   "

## 2022-05-12 NOTE — LETTER
Nancy King  [phone] [facility] [address]    05/12/22    Dear Nancy King ,    We have been unsuccessful in our attempts to contact you regarding your Anticoagulation Service appointments. Warfarin is a potent blood-thinning agent that requires monitoring to ensure that the dosage is correct for your body.  If it isn't, you could develop serious, sometimes life-threatening bleeding problems or life-threatening blood clots or stroke could result.    To monitor you effectively, we need to be able to communicate with you.  This is a requirement to be followed by our Service.       If you repeatedly fail to keep your lab appointments, you are at risk of being discharged from the Anticoagulation Service.    It is extremely important that you contact the clinic as soon as possible to arrange appropriate follow up.  We are open Monday-Friday 8 am until 5 pm.  You may reach our Service at [phone].           Sincerely,           Doug Castorena, PharmD, [facility]  Clinic Supervisor  [facility]  Outpatient Anticoagulation Service

## 2022-05-12 NOTE — LETTER
May 12, 2022         Nancy King  [phone] [facility] [address]        Dear Nancy:      We have attempted to contact you by phone multiple times, but have been unsuccessful. Your stress test was abnormal and may require intervention with a cardiac catheterization.  We would like to discuss this with you further. Please call [phone] as soon as possible to discuss your results. If you feel your symptoms are worsening, we recommend you go the ER immediately.         Sincerely,      Julianne LOPEZ

## 2022-05-12 NOTE — TELEPHONE ENCOUNTER
----- Message from TROY Deluca sent at 5/12/2022  8:40 AM PDT -----  Recommend cardiac cath for abnormal stress test.    Let me know if she wants an apt to discuss findings and plan.    If symptoms worsening, recommend ER. SC

## 2022-05-18 ENCOUNTER — ANTICOAGULATION MONITORING (OUTPATIENT)
Dept: VASCULAR LAB | Facility: MEDICAL CENTER | Age: 87
End: 2022-05-18
Payer: MEDICARE

## 2022-05-18 DIAGNOSIS — Z79.01 CHRONIC ANTICOAGULATION: ICD-10-CM

## 2022-05-18 LAB — INR PPP: 2.5 (ref 2–3.5)

## 2022-05-18 NOTE — PROGRESS NOTES
Anticoagulation Summary  As of 2022    INR goal:  2.0-3.0   TTR:  55.2 % (4 y)   INR used for dosin.50 (2022)   Warfarin maintenance plan:  3 mg (1 mg x 3) every day   Weekly warfarin total:  21 mg   Plan last modified:  Denise M Filter (2/15/2022)   Next INR check:  2022   Target end date:  Indefinite    Indications    Acute thromboembolism of deep veins of lower extremity (HCC) [I82.409]  Pulmonary embolism (HCC) [I26.99]  Chronic anticoagulation [Z79.01]             Anticoagulation Episode Summary     INR check location:  Anticoagulation Clinic    Preferred lab:      Send INR reminders to:      Comments:        Anticoagulation Care Providers     Provider Role Specialty Phone number    [facility] Responsible  [phone]    Rambo Shah M.D. Responsible [facility] [phone]          Refer to Anticoagulation Patient Findings for HPI  Patient Findings     Negatives:  Signs/symptoms of thrombosis, Signs/symptoms of bleeding, Laboratory test error suspected, Change in health, Change in alcohol use, Change in activity, Upcoming invasive procedure, Emergency department visit, Upcoming dental procedure, Missed doses, Extra doses, Change in medications, Change in diet/appetite, Hospital admission, Bruising, Other complaints          Spoke with patient  to report a therapeutic INR.      Pt instructed to continue with current warfarin dosing regimen, confirms dosing.   Will follow up in 2 week(s).     Audie Zuniga, PharmD, BCACP

## 2022-06-22 ENCOUNTER — TELEPHONE (OUTPATIENT)
Dept: CARDIOLOGY | Facility: MEDICAL CENTER | Age: 87
End: 2022-06-22
Payer: MEDICARE

## 2022-06-22 ENCOUNTER — TELEPHONE (OUTPATIENT)
Dept: VASCULAR LAB | Facility: MEDICAL CENTER | Age: 87
End: 2022-06-22
Payer: MEDICARE

## 2022-06-22 NOTE — TELEPHONE ENCOUNTER
SC      Caller: Augustin King, pt's     Medication Name and Dosage: metoprolol tartrate (LOPRESSOR) and   levothyroxine (SYNTHROID) 50 MCG Tab    Did patient contact pharmacy (If no, please call pharmacy first): Yes    Medication amount left: Metoprolol- 2 days left and levothyroxine -a month left    Preferred Pharmacy:   [facility]  ([facility]) - [address] [address]   Phone:  [phone]  Fax:  [phone]   QUETA #:  --      Other questions (Topic): Augustin stated that at the last appt the pt's medication  metoprolol tartrate (LOPRESSOR) was changed to 50 MG for twice a day and pt is needing that updated. Augustin would like  The medication metoprolol tartrate (LOPRESSOR) quantity of 180 for it to be a 3 month supply for the pt.    Callback Number (Will only call for issues):[phone]       Thank you,  Maggie GATICA

## 2022-06-22 NOTE — TELEPHONE ENCOUNTER
Attempted to call patient multiple times to discuss medications. Call disconnected each time RN spoke. Letter generated and placed in mail for patient.

## 2022-06-22 NOTE — LETTER
Nancy King  [phone] [facility] [address]    06/22/22    Dear Nancy King ,    We have been unsuccessful in our attempts to contact you regarding your Anticoagulation Service appointments. Warfarin is a potent blood-thinning agent that requires monitoring to ensure that the dosage is correct for your body.  If it isn't, you could develop serious, sometimes life-threatening bleeding problems or life-threatening blood clots or stroke could result.    To monitor you effectively, we need to be able to communicate with you.  This is a requirement to be followed by our Service.       If you repeatedly fail to keep your lab appointments, you are at risk of being discharged from the Anticoagulation Service.    It is extremely important that you contact the clinic as soon as possible to arrange appropriate follow up.  We are open Monday-Friday 8 am until 5 pm.  You may reach our Service at [phone].           Sincerely,           Doug Castorena, PharmD, [facility]  Clinic Supervisor  [facility]  Outpatient Anticoagulation Service

## 2022-06-22 NOTE — LETTER
June 22, 2022        Nancy King  [phone] [place] [address]        Dear Nancy,      We have attempted to contact you by phone multiple times, but have been unsuccessful. In regards to your message, we are unable to refill your Levothyroxine for you. Please reach out to your primary care doctor for this request or if you do not have one we recommend going to Urgent Care. Your current Metoprolol dose is 25 mg twice daily, not 50 mg twice daily. The prescription was sent in for a year supply back in April.     A month ago, we tried to contact you about your abnormal stress test and the need for a cardiac catheterization. Please call our office at [phone] to schedule an appointment to discuss this further. If you feel your symptoms are worsening, we recommend you go to the ER immediately.       Thank you,      Julianne CRESPO

## 2022-06-23 ENCOUNTER — HOSPITAL ENCOUNTER (OUTPATIENT)
Dept: CARDIOLOGY | Facility: MEDICAL CENTER | Age: 87
End: 2022-06-23
Attending: INTERNAL MEDICINE
Payer: MEDICARE

## 2022-06-23 DIAGNOSIS — I25.10 CORONARY ARTERY DISEASE INVOLVING NATIVE CORONARY ARTERY OF NATIVE HEART WITHOUT ANGINA PECTORIS: Chronic | ICD-10-CM

## 2022-06-23 DIAGNOSIS — I10 BENIGN ESSENTIAL HTN: Chronic | ICD-10-CM

## 2022-06-23 DIAGNOSIS — R07.89 OTHER CHEST PAIN: ICD-10-CM

## 2022-06-23 LAB
LV EJECT FRACT  99904: 60
LV EJECT FRACT MOD 2C 99903: 60.78
LV EJECT FRACT MOD 4C 99902: 67.03
LV EJECT FRACT MOD BP 99901: 51.35

## 2022-06-23 PROCEDURE — 93306 TTE W/DOPPLER COMPLETE: CPT

## 2022-06-23 PROCEDURE — 93306 TTE W/DOPPLER COMPLETE: CPT | Mod: 26 | Performed by: INTERNAL MEDICINE

## 2022-06-24 DIAGNOSIS — E78.5 DYSLIPIDEMIA: ICD-10-CM

## 2022-06-24 DIAGNOSIS — E78.5 HYPERLIPOPROTEINEMIA: ICD-10-CM

## 2022-06-24 DIAGNOSIS — I25.10 CORONARY ARTERY DISEASE INVOLVING NATIVE CORONARY ARTERY OF NATIVE HEART WITHOUT ANGINA PECTORIS: ICD-10-CM

## 2022-06-28 ENCOUNTER — TELEPHONE (OUTPATIENT)
Dept: CARDIOLOGY | Facility: MEDICAL CENTER | Age: 87
End: 2022-06-28

## 2022-06-28 ENCOUNTER — HOSPITAL ENCOUNTER (OUTPATIENT)
Facility: MEDICAL CENTER | Age: 87
End: 2022-06-28
Attending: INTERNAL MEDICINE | Admitting: INTERNAL MEDICINE
Payer: MEDICARE

## 2022-06-28 ENCOUNTER — OFFICE VISIT (OUTPATIENT)
Dept: CARDIOLOGY | Facility: MEDICAL CENTER | Age: 87
End: 2022-06-28
Payer: MEDICARE

## 2022-06-28 VITALS
HEIGHT: 65 IN | WEIGHT: 169 LBS | BODY MASS INDEX: 28.16 KG/M2 | HEART RATE: 56 BPM | DIASTOLIC BLOOD PRESSURE: 60 MMHG | OXYGEN SATURATION: 95 % | SYSTOLIC BLOOD PRESSURE: 138 MMHG | RESPIRATION RATE: 16 BRPM

## 2022-06-28 DIAGNOSIS — R94.39 ABNORMAL MYOCARDIAL PERFUSION STUDY: ICD-10-CM

## 2022-06-28 DIAGNOSIS — E78.5 DYSLIPIDEMIA: ICD-10-CM

## 2022-06-28 DIAGNOSIS — I25.83 CORONARY ARTERY DISEASE DUE TO LIPID RICH PLAQUE: Chronic | ICD-10-CM

## 2022-06-28 DIAGNOSIS — I10 BENIGN ESSENTIAL HTN: Chronic | ICD-10-CM

## 2022-06-28 DIAGNOSIS — I25.10 CORONARY ARTERY DISEASE DUE TO LIPID RICH PLAQUE: Chronic | ICD-10-CM

## 2022-06-28 DIAGNOSIS — R07.89 OTHER CHEST PAIN: ICD-10-CM

## 2022-06-28 PROCEDURE — 99215 OFFICE O/P EST HI 40 MIN: CPT | Performed by: INTERNAL MEDICINE

## 2022-06-28 RX ORDER — SITAGLIPTIN 100 MG/1
TABLET, FILM COATED ORAL
Qty: 90 TABLET | Refills: 3 | Status: SHIPPED | OUTPATIENT
Start: 2022-06-28 | End: 2023-08-14 | Stop reason: SDUPTHER

## 2022-06-28 ASSESSMENT — ENCOUNTER SYMPTOMS
BRUISES/BLEEDS EASILY: 0
CHILLS: 0
NERVOUS/ANXIOUS: 0
DIZZINESS: 0
FEVER: 0
VOMITING: 0
EYES NEGATIVE: 1
MUSCULOSKELETAL NEGATIVE: 1
BLURRED VISION: 0
FOCAL WEAKNESS: 0
PSYCHIATRIC NEGATIVE: 1
CONSTITUTIONAL NEGATIVE: 1
NAUSEA: 0
DEPRESSION: 0
COUGH: 0
ABDOMINAL PAIN: 0
DOUBLE VISION: 0
NEUROLOGICAL NEGATIVE: 1
HEADACHES: 0
CLAUDICATION: 0
WEIGHT LOSS: 0
MYALGIAS: 0
PALPITATIONS: 0
GASTROINTESTINAL NEGATIVE: 1
RESPIRATORY NEGATIVE: 1
SHORTNESS OF BREATH: 0
WEAKNESS: 0

## 2022-06-28 NOTE — PROGRESS NOTES
"Chief Complaint   Patient presents with   • Coronary Artery Disease     F/V Dx: Coronary artery disease involving native coronary artery of native heart without angina pectoris   • Hypertension     F/V Dx: Benign essential HTN       Subjective     Nancy King is a 86 y.o. female who presents today for follow up of chest pain, abnormal myocardial perfusion scan.    Since the patient's last visit on 03/29/22, she has continued to experience chest pain. She denies shortness of breath, palpitations, nausea/vomiting or diaphoresis. She recently underwent a myocardial perfusion imaging study which was abnormal as described below.     Past Medical History:   Diagnosis Date   • Acute venous embolism and thrombosis of unspecified deep vessels of lower extremity    • Angina pectoris 5/11/2012    \"with stress\"   • Benign essential HTN 5/11/2012   • CAD (coronary artery disease) 5/11/2012   • Cancer (HCC)     right breast   • Chronic anticoagulation 5/11/2012   • Heart burn    • Hyperlipidemia 5/11/2012   • Hyperlipoproteinemia 5/11/2012   • Indigestion    • Obesity 5/11/2012   • Other specified disorder of intestines     diarrhea   • Pulmonary embolism (HCC)    • Unspecified cataract     removed bilat   • Unspecified disorder of thyroid    • Unspecified urinary incontinence      Past Surgical History:   Procedure Laterality Date   • MASTECTOMY  1/12/2015    Performed by Dwight Birch M.D. at SURGERY [facility]   • NODE BIOPSY SENTINEL  1/12/2015    Performed by Dwight Birch M.D. at SURGERY [facility]   • AXILLARY NODE DISSECTION  1/12/2015    Performed by Dwight Birch M.D. at SURGERY [facility]   • APPENDECTOMY     • CHOLECYSTECTOMY     • HYSTERECTOMY, TOTAL ABDOMINAL     • OTHER      Bladder   • TONSILLECTOMY AND ADENOIDECTOMY       Family History   Problem Relation Age of Onset   • Heart Disease Mother    • Heart Disease Father      Social History     Socioeconomic History   • Marital status: " "     Spouse name: Not on file   • Number of children: Not on file   • Years of education: Not on file   • Highest education level: Not on file   Occupational History   • Not on file   Tobacco Use   • Smoking status: Never Smoker   • Smokeless tobacco: Never Used   Vaping Use   • Vaping Use: Never used   Substance and Sexual Activity   • Alcohol use: Yes     Comment: \"Couple drinks every night\"   • Drug use: No   • Sexual activity: Not on file   Other Topics Concern   • Not on file   Social History Narrative   • Not on file     Social Determinants of Health     Financial Resource Strain: Not on file   Food Insecurity: Not on file   Transportation Needs: Not on file   Physical Activity: Not on file   Stress: Not on file   Social Connections: Not on file   Intimate Partner Violence: Not on file   Housing Stability: Not on file     Allergies   Allergen Reactions   • Sulfa Drugs      Swelling   • Levaquin      Rash     (Medications reviewed.)  Outpatient Encounter Medications as of 6/28/2022   Medication Sig Dispense Refill   • JANUVIA 100 MG Tab TAKE 1 TABLET BY MOUTH  DAILY 90 Tablet 3   • levothyroxine (SYNTHROID) 50 MCG Tab Take 1 Tablet by mouth every morning before breakfast. 90 Tablet 0   • metoprolol tartrate (LOPRESSOR) 25 MG Tab Take 1 Tablet by mouth 2 times a day. (Patient taking differently: Take 50 mg by mouth 2 times a day.) 180 Tablet 3   • nitroglycerin (NITROSTAT) 0.4 MG SL Tab Place 1 Tablet under the tongue as needed for Chest Pain. 25 Tablet 5   • simvastatin (ZOCOR) 40 MG Tab Take 1 Tablet by mouth every evening. 90 Tablet 3   • losartan (COZAAR) 100 MG Tab Take 1 Tablet by mouth every day. 90 Tablet 3   • warfarin (COUMADIN) 1 MG Tab TAKE 2 AND 1/2 TABLETS BY  MOUTH DAILY AS DIRECTED BY  [facility] ANTICOAGULATION  SERVICES 225 Tablet 3   • Capsaicin-Menthol (PAIN RELIEF EX) Apply  topically as needed.       No facility-administered encounter medications on file as of 6/28/2022.     Review of " "Systems   Constitutional: Negative.  Negative for chills, fever, malaise/fatigue and weight loss.   HENT: Negative.  Negative for hearing loss.    Eyes: Negative.  Negative for blurred vision and double vision.   Respiratory: Negative.  Negative for cough and shortness of breath.    Cardiovascular: Positive for chest pain. Negative for palpitations, claudication and leg swelling.   Gastrointestinal: Negative.  Negative for abdominal pain, nausea and vomiting.   Genitourinary: Negative.  Negative for dysuria and urgency.   Musculoskeletal: Negative.  Negative for joint pain and myalgias.   Skin: Negative.  Negative for itching and rash.   Neurological: Negative.  Negative for dizziness, focal weakness, weakness and headaches.   Endo/Heme/Allergies: Negative.  Does not bruise/bleed easily.   Psychiatric/Behavioral: Negative.  Negative for depression. The patient is not nervous/anxious.               Objective     /60 (BP Location: Left arm, Patient Position: Sitting, BP Cuff Size: Adult)   Pulse (!) 56   Resp 16   Ht 1.651 m (5' 5\")   Wt 76.7 kg (169 lb)   SpO2 95%   BMI 28.12 kg/m²     Physical Exam  Constitutional:       Appearance: She is well-developed.   HENT:      Head: Normocephalic and atraumatic.   Neck:      Vascular: No JVD.   Cardiovascular:      Rate and Rhythm: Normal rate and regular rhythm.      Heart sounds: Normal heart sounds.   Pulmonary:      Effort: Pulmonary effort is normal.      Breath sounds: Normal breath sounds.   Abdominal:      General: Bowel sounds are normal.      Palpations: Abdomen is soft.      Comments: No hepatosplenomegaly.   Musculoskeletal:         General: Normal range of motion.   Lymphadenopathy:      Cervical: No cervical adenopathy.   Skin:     General: Skin is warm and dry.   Neurological:      Mental Status: She is alert and oriented to person, place, and time.            CARDIAC STUDIES/PROCEDURES:     CTA OF CHEST (01/30/15)  1.  Extensive pulmonary " thromboembolism involving majority of right pulmonary arteries and significant portions of left pulmonary arteries  2.  No other significant finding  3.  Right breast postoperative changes  4.  Aortic atherosclerotic plaque  5.  Infiltration of liver    ECHOCARDIOGRAM CONCLUSIONS (06/23/22)  Prior echo on 06/11/18, compared to the report of the prior study,   there has been development of mild aortic stenosis.  Normal left ventricular systolic function.   The left ventricular ejection fraction is visually estimated to be 60%.  Mild mitral regurgitation.  Mild aortic valve stenosis.  Vmax is 2.1 m/s. Transvalvular gradients are - Peak:  17 mmHg, Mean: 10   mmHg.   Mild tricuspid regurgitation.  Estimated right ventricular systolic pressure is 25 mmHg.  (study result reviewed)     ECHOCARDIOGRAM CONCLUSIONS (06/11/18)  Prior echo done 02/01/15. Compared to the report of the study done -   there has been no significant change.   Normal left ventricular systolic function.  Left ventricular ejection fraction is visually estimated to be 60%.  Grade I diastolic dysfunction.  Mild mitral regurgitation.  Mild tricuspid regurgitation.  Estimated right ventricular systolic pressure is 25 mmHg.     ECHOCARDIOGRAM CONCLUSIONS (02/01/15)  No prior study is available for comparison.   Normal left ventricular size and function. Grade I diastolic   dysfunction - mitral inflow E/A is <1.0. Left ventricular ejection   fraction is 60% to 65%. Mild concentric left ventricular hypertrophy.  No significant valve abnormalities.   Right ventricular systolic pressure is estimated to be 45-50 mmHg.  Normal pericardium without effusion.     EKG performed on (05/24/18) EKG shows sinus rhythm with diffuse ST abnormalities.     Laboratory results of (06/15/18) Cholesterol profile of 172/147/63/80 noted.  Laboratory results of (02/05/15) Cholesterol profile of 129/144/34/66 noted.     LOWER EXTREMITY VENOUS ULTRASOUND (01/30/15)  1.  Normal right  lower extremity superficial and deep venous examination.   2.  Left lower extremity deep and superficial venous thrombosis.   3.  Left popliteal cyst.     MYOCARDIAL PERFUSION STUDY CONCLUSIONS (05/11/22)  NUCLEAR IMAGING INTERPRETATION   Normal left ventricular size, ejection fraction, and wall motion.   LV ejection fraction = 60%.   There is a moderate sized region of ischemia in the apex.   ECG INTERPRETATION   Negative stress ECG for ischemia.(study result reviewed)    MYOCARDIAL PERFUSION STUDY CONCLUSIONS (06/15/18)  Very small reversible defect is noted at the apical cap in the stress images, concerning for ischemia. Artifact cannot be ruled out.  Normal left ventricular wall motion, with EF of 75%.   ECG INTERPRETATION  Negative stress ECG for ischemia.       Assessment & Plan     1. Other chest pain     2. Abnormal myocardial perfusion study     3. Coronary artery disease due to lipid rich plaque     4. Benign essential HTN     5. Dyslipidemia         Medical Decision Making: Today's Assessment/Status/Plan:        1. Chest pain, abnormal myocardial perfusion imaging study with coronary artery disease: She is experiencing chest pain as described. We will schedule for cardiac catheterization. She understands the risks and benefits and agrees with plan.  2. Aortic stenosis: She remains asymptomatic with mild aortic stenosis. We will follow her clinically and repeat an echocardiogram in one year.  3. Hypertension: Blood pressure is well controlled. We will continue with metoprolol, losartan.  4. Hyperlipidemia: She is doing well on statin therapy without myalgia symptoms.  5. Pulmonary embolism with deep venous thrombosis on chronic anticoagulation therapy (warfarin): She is clinically doing well without recurrence or chest pain or shortness of breath. She has chronic edema of her left lower extremity.    The risks, benefits, and alternatives to coronary angiography with IV sedation were discussed in great  detail. Specific risks mentioned include bleeding, infection, kidney damage, allergic reaction, cardiac perforation with possible tamponade requiring murphy-cardiocentesis or possible open heart surgery. In addition, we discussed that 10% of patients will experience small to moderate bruising at the side of the arterial puncture. Lastly the risks of heart attack, stroke, and death were discussed; the risks of major complications such as heart attack or stroke caused by the angiogram is less than 1%; the risk of death is approximately 1 in 1000. The patient verbalized understanding of these potential complications and wishes to proceed with this procedure.     We will follow up in 3 months.     CC Dee Saldana

## 2022-06-28 NOTE — TELEPHONE ENCOUNTER
Patient is scheduled on 7-8-22 for a [facility] w/poss with . Patient was told to hold warfarin for 5 days prior and Doug Castorena at the coumadin clinic notified of patient stopping. Patient was also told to hold Januvia AM day of procedure. Patient to check in at 7:30 for a 9:30 procedure. H&P was done on 6-28-22 by Dr. Obrien. Pre admit to call and do a telephone pre admit,but patient was also told to call too. If you have any trouble getting a hold of the patient, you can email them at the email address on file, patients  says they have trouble getting calls from [facility] and checks his email sobia.

## 2022-06-28 NOTE — TELEPHONE ENCOUNTER
SARA Avelar saw this patient today and ordered:     CL-LEFT HEART CATHETERIZATION WITH POSSIBLE INTERVENTION [[id number]]

## 2022-06-30 NOTE — TELEPHONE ENCOUNTER
Recvd call from patient's . They need to cancel this procedure at this time. That have had a family emergency come up and will call back to reschedule once everything has calmed down. GINAI to Dr. Obrien     Cancelled case with Griselda in cath lab scheduling.

## 2022-07-06 ENCOUNTER — ANTICOAGULATION MONITORING (OUTPATIENT)
Dept: VASCULAR LAB | Facility: MEDICAL CENTER | Age: 87
End: 2022-07-06
Payer: MEDICARE

## 2022-07-06 DIAGNOSIS — Z79.01 CHRONIC ANTICOAGULATION: ICD-10-CM

## 2022-07-06 LAB — INR PPP: 3.6 (ref 2–3.5)

## 2022-07-06 NOTE — PROGRESS NOTES
Attempted to call Pt 4 times, unfortunately phone immediately hangs up and unable to talk to anyone. Try to call again later.     Mari Doan, PhT

## 2022-07-07 NOTE — PROGRESS NOTES
Anticoagulation Summary  As of 7/6/2022    INR goal:  2.0-3.0   TTR:  54.8 % (4.2 y)   INR used for dosing:  3.60 (7/6/2022)   Warfarin maintenance plan:  3 mg (1 mg x 3) every day   Weekly warfarin total:  21 mg   Plan last modified:  Denise MONSE Filter (2/15/2022)   Next INR check:  7/13/2022   Target end date:  Indefinite    Indications    Acute thromboembolism of deep veins of lower extremity (HCC) [I82.409]  Pulmonary embolism (HCC) [I26.99]  Chronic anticoagulation [Z79.01]             Anticoagulation Episode Summary     INR check location:  Anticoagulation Clinic    Preferred lab:      Send INR reminders to:      Comments:        Anticoagulation Care Providers     Provider Role Specialty Phone number    [facility] Responsible  [phone]    Rambo Shah M.D. Responsible [facility] [phone]        Anticoagulation Patient Findings      Left voicemail (on cell phone as preferred number not working) to report a SUPRA-therapeutic INR of 3.6.      Will have pt take HOLD todays dose of warfarin then   Pt to continue with current warfarin dosing regimen. Requested pt contact the clinic for any s/s of unusual bleeding, bruising, clotting or any changes to diet or medication.    FU INR in 1 week(s).  Confirmed plan with Rin prior to calling    Amparo Lancaster

## 2022-07-08 ENCOUNTER — APPOINTMENT (OUTPATIENT)
Dept: CARDIOLOGY | Facility: MEDICAL CENTER | Age: 87
End: 2022-07-08
Attending: INTERNAL MEDICINE
Payer: MEDICARE

## 2022-07-28 ENCOUNTER — TELEPHONE (OUTPATIENT)
Dept: VASCULAR LAB | Facility: MEDICAL CENTER | Age: 87
End: 2022-07-28
Payer: MEDICARE

## 2022-07-29 LAB — INR PPP: 3 (ref 2–3.5)

## 2022-08-01 ENCOUNTER — ANTICOAGULATION MONITORING (OUTPATIENT)
Dept: MEDICAL GROUP | Facility: PHYSICIAN GROUP | Age: 87
End: 2022-08-01
Payer: MEDICARE

## 2022-08-01 DIAGNOSIS — I26.99 PULMONARY EMBOLISM, UNSPECIFIED CHRONICITY, UNSPECIFIED PULMONARY EMBOLISM TYPE, UNSPECIFIED WHETHER ACUTE COR PULMONALE PRESENT (HCC): ICD-10-CM

## 2022-08-01 DIAGNOSIS — I82.409 ACUTE THROMBOEMBOLISM OF DEEP VEINS OF LOWER EXTREMITY, UNSPECIFIED LATERALITY (HCC): ICD-10-CM

## 2022-08-01 DIAGNOSIS — Z79.01 CHRONIC ANTICOAGULATION: ICD-10-CM

## 2022-08-01 NOTE — PROGRESS NOTES
Anticoagulation Summary  As of 8/1/2022    INR goal:  2.0-3.0   TTR:  54.0 % (4.2 y)   INR used for dosing:  3.00 (7/29/2022)   Warfarin maintenance plan:  3 mg (1 mg x 3) every day   Weekly warfarin total:  21 mg   Plan last modified:  Denise SHEA Filter (2/15/2022)   Next INR check:  8/15/2022   Target end date:  Indefinite    Indications    Acute thromboembolism of deep veins of lower extremity (HCC) [I82.409]  Pulmonary embolism (HCC) [I26.99]  Chronic anticoagulation [Z79.01]             Anticoagulation Episode Summary     INR check location:  Anticoagulation Clinic    Preferred lab:      Send INR reminders to:      Comments:        Anticoagulation Care Providers     Provider Role Specialty Phone number    [facility] Responsible  [phone]    Rambo Shah M.D. Responsible [facility] [phone]          Refer to Anticoagulation Patient Findings for HPI  Patient Findings     Negatives:  Signs/symptoms of thrombosis, Signs/symptoms of bleeding, Laboratory test error suspected, Change in health, Change in alcohol use, Change in activity, Upcoming invasive procedure, Emergency department visit, Upcoming dental procedure, Missed doses, Extra doses, Change in medications, Change in diet/appetite, Hospital admission, Bruising, Other complaints          Spoke with patient's  Will to report a therapeutic INR.      Pt is NOT on antiplatelet therapy    Pt instructed to continue with current warfarin dosing regimen, confirms dosing.   Will follow up in 2 week(s).     Amparo Lancaster     +++++++++++++++++++++++++++++++++++++++++++++++++++++++++++++++++++    I have reviewed and concur with the above plan.       Current Outpatient Medications:   •  nitroglycerin, DISSOLVE 1 TABLET UNDER THE TONGUE EVERY 5 MINUTES AS  NEEDED FOR CHEST PAIN. MAX  OF 3 TABLETS IN 15 MINUTES. CALL 911 IF PAIN PERSISTS.  •  Januvia, TAKE 1 TABLET BY MOUTH  DAILY  •  levothyroxine, 50 mcg, Oral, QAM AC  •   metoprolol tartrate, 25 mg, Oral, BID (Patient taking differently: 50 mg, Oral, 2 TIMES DAILY)  •  simvastatin, 40 mg, Oral, Nightly  •  losartan, 100 mg, Oral, DAILY  •  warfarin, TAKE 2 AND 1/2 TABLETS BY  MOUTH DAILY AS DIRECTED BY  [facility] ANTICOAGULATION  SERVICES  •  Capsaicin-Menthol (PAIN RELIEF EX), Apply  topically as needed.    Harrison Adames, PharmD, MS, BCACP, LCC  [facility]  Phone: [phone]  Fax: [phone]  On call: [phone]  General scheduling/information [phone]  For emergencies please dial 911  Please do not use [facility] for urgent matters, call the phone numbers listed above.    This note was created using voice recognition software (Dragon). The accuracy of the dictation is limited by the abilities of the software. I have reviewed the note prior to signing, however some errors in grammar and context are still possible. If you have any questions related to this note please do not hesitate to contact our office.

## 2022-08-29 LAB — INR PPP: 3.4 (ref 2–3.5)

## 2022-09-01 ENCOUNTER — ANTICOAGULATION MONITORING (OUTPATIENT)
Dept: VASCULAR LAB | Facility: MEDICAL CENTER | Age: 87
End: 2022-09-01
Payer: MEDICARE

## 2022-09-01 DIAGNOSIS — Z79.01 CHRONIC ANTICOAGULATION: ICD-10-CM

## 2022-09-01 DIAGNOSIS — I26.99 PULMONARY EMBOLISM, UNSPECIFIED CHRONICITY, UNSPECIFIED PULMONARY EMBOLISM TYPE, UNSPECIFIED WHETHER ACUTE COR PULMONALE PRESENT (HCC): ICD-10-CM

## 2022-09-01 NOTE — PROGRESS NOTES
Anticoagulation Summary  As of 9/1/2022      INR goal:  2.0-3.0   TTR:  53.0 % (4.3 y)   INR used for dosing:  3.40 (8/29/2022)   Warfarin maintenance plan:  3 mg (1 mg x 3) every day   Weekly warfarin total:  21 mg   Plan last modified:  Denise M Filter (2/15/2022)   Next INR check:  9/8/2022   Target end date:  Indefinite    Indications    Acute thromboembolism of deep veins of lower extremity (HCC) [I82.409]  Pulmonary embolism (HCC) [I26.99]  Chronic anticoagulation [Z79.01]                 Anticoagulation Episode Summary       INR check location:  Anticoagulation Clinic    Preferred lab:      Send INR reminders to:      Comments:            Anticoagulation Care Providers       Provider Role Specialty Phone number    [facility] Responsible  [phone]    Rambo Shah M.D. Responsible [facility] [phone]          Anticoagulation Patient Findings      Left voicemail message to report a SUPRA therapeutic INR of 3.4.    Will have pt take a decreased does of 1.5 mg today and then continue on with current warfarin dosing regimen.   Requested pt contact the clinic for any s/s of unusual bleeding, bruising, clotting or any changes to diet or medication.    FU INR in 1 week(s).    Audie Zuniga, PharmD, BCACP

## 2022-09-13 ENCOUNTER — HOSPITAL ENCOUNTER (OUTPATIENT)
Dept: RADIOLOGY | Facility: MEDICAL CENTER | Age: 87
End: 2022-09-13
Payer: MEDICARE

## 2022-09-13 NOTE — PROGRESS NOTES
Anticoagulation clinic    Reminder voice message for patient regarding getting INR done ASAP for anticoagulation clinic.     Harrison Adames, PharmD                   
13-Sep-2022 11:21

## 2022-09-14 ENCOUNTER — HOSPITAL ENCOUNTER (OUTPATIENT)
Dept: RADIOLOGY | Facility: MEDICAL CENTER | Age: 87
End: 2022-09-14
Attending: SURGERY
Payer: MEDICARE

## 2022-09-14 ENCOUNTER — HOSPITAL ENCOUNTER (OUTPATIENT)
Dept: RADIOLOGY | Facility: MEDICAL CENTER | Age: 87
End: 2022-09-14
Attending: INTERNAL MEDICINE
Payer: MEDICARE

## 2022-09-14 DIAGNOSIS — I25.10 CORONARY ARTERY DISEASE DUE TO LIPID RICH PLAQUE: Chronic | ICD-10-CM

## 2022-09-14 DIAGNOSIS — R07.89 OTHER CHEST PAIN: ICD-10-CM

## 2022-09-14 DIAGNOSIS — Z85.3 PERSONAL HISTORY OF MALIGNANT NEOPLASM OF BREAST: ICD-10-CM

## 2022-09-14 DIAGNOSIS — R94.39 ABNORMAL MYOCARDIAL PERFUSION STUDY: ICD-10-CM

## 2022-09-14 DIAGNOSIS — I25.83 CORONARY ARTERY DISEASE DUE TO LIPID RICH PLAQUE: Chronic | ICD-10-CM

## 2022-09-14 PROCEDURE — G0279 TOMOSYNTHESIS, MAMMO: HCPCS

## 2022-09-14 PROCEDURE — 76642 ULTRASOUND BREAST LIMITED: CPT | Mod: RT

## 2022-11-28 DIAGNOSIS — I10 BENIGN ESSENTIAL HTN: ICD-10-CM

## 2022-11-28 RX ORDER — LOSARTAN POTASSIUM 100 MG/1
100 TABLET ORAL DAILY
Qty: 90 TABLET | Refills: 2 | Status: SHIPPED | OUTPATIENT
Start: 2022-11-28 | End: 2023-07-12

## 2022-11-28 NOTE — TELEPHONE ENCOUNTER
Is the patient due for a refill? No    Was the patient seen the past year? Yes    Date of last office visit: 6/28/22    Does the patient have an upcoming appointment?  No       Provider to refill: JI     Does the patients insurance require a 100 day supply?  No    losartan (COZAAR) 100 MG Tab 90 Tablet 3/3 3/29/2022     Sig - Route: Take 1 Tablet by mouth every day. - Oral    Sent to pharmacy as: Losartan Potassium 100 MG Oral Tablet (COZAAR)    E-Prescribing Status: Receipt confirmed by pharmacy (3/29/2022 11:14 AM PDT)

## 2023-03-07 DIAGNOSIS — E78.00 PURE HYPERCHOLESTEROLEMIA: Chronic | ICD-10-CM

## 2023-03-07 RX ORDER — SIMVASTATIN 40 MG
TABLET ORAL
Qty: 90 TABLET | Refills: 0 | Status: ON HOLD | OUTPATIENT
Start: 2023-03-07 | End: 2023-07-17

## 2023-03-07 NOTE — TELEPHONE ENCOUNTER
Is the patient due for a refill? Yes    Was the patient seen the past year? Yes    Date of last office visit: 6/28/2023    Does the patient have an upcoming appointment?  No   If yes, When?     Provider to refill:SARA    Does the patients insurance require a 100 day supply?  No

## 2023-07-12 ENCOUNTER — HOSPITAL ENCOUNTER (INPATIENT)
Facility: MEDICAL CENTER | Age: 88
LOS: 5 days | DRG: 281 | End: 2023-07-17
Attending: EMERGENCY MEDICINE | Admitting: HOSPITALIST
Payer: MEDICARE

## 2023-07-12 ENCOUNTER — APPOINTMENT (OUTPATIENT)
Dept: RADIOLOGY | Facility: MEDICAL CENTER | Age: 88
DRG: 281 | End: 2023-07-12
Attending: EMERGENCY MEDICINE
Payer: MEDICARE

## 2023-07-12 ENCOUNTER — APPOINTMENT (OUTPATIENT)
Dept: CARDIOLOGY | Facility: MEDICAL CENTER | Age: 88
DRG: 281 | End: 2023-07-12
Attending: NURSE PRACTITIONER
Payer: MEDICARE

## 2023-07-12 ENCOUNTER — HOSPITAL ENCOUNTER (OUTPATIENT)
Dept: RADIOLOGY | Facility: MEDICAL CENTER | Age: 88
End: 2023-07-12

## 2023-07-12 DIAGNOSIS — I21.4 NSTEMI (NON-ST ELEVATED MYOCARDIAL INFARCTION) (HCC): ICD-10-CM

## 2023-07-12 DIAGNOSIS — Z79.01 CHRONIC ANTICOAGULATION: Chronic | ICD-10-CM

## 2023-07-12 DIAGNOSIS — R07.9 CHEST PAIN, UNSPECIFIED TYPE: ICD-10-CM

## 2023-07-12 DIAGNOSIS — I48.92 ATRIAL FLUTTER WITH RAPID VENTRICULAR RESPONSE (HCC): ICD-10-CM

## 2023-07-12 DIAGNOSIS — I82.409 ACUTE THROMBOEMBOLISM OF DEEP VEINS OF LOWER EXTREMITY, UNSPECIFIED LATERALITY (HCC): Chronic | ICD-10-CM

## 2023-07-12 DIAGNOSIS — I48.91 ATRIAL FIBRILLATION, RAPID (HCC): ICD-10-CM

## 2023-07-12 DIAGNOSIS — I16.1 HYPERTENSIVE EMERGENCY: ICD-10-CM

## 2023-07-12 PROBLEM — E11.9 DM (DIABETES MELLITUS) (HCC): Status: ACTIVE | Noted: 2023-07-12

## 2023-07-12 PROBLEM — I87.1 IVC (INFERIOR VENA CAVA OBSTRUCTION): Status: ACTIVE | Noted: 2023-07-12

## 2023-07-12 PROBLEM — R32 URINARY INCONTINENCE: Status: ACTIVE | Noted: 2023-07-12

## 2023-07-12 PROBLEM — Z95.828 S/P IVC FILTER: Status: ACTIVE | Noted: 2023-07-12

## 2023-07-12 LAB
ALBUMIN SERPL BCP-MCNC: 4.4 G/DL (ref 3.2–4.9)
ALBUMIN/GLOB SERPL: 1.8 G/DL
ALP SERPL-CCNC: 143 U/L (ref 30–99)
ALT SERPL-CCNC: 15 U/L (ref 2–50)
ANION GAP SERPL CALC-SCNC: 15 MMOL/L (ref 7–16)
APTT PPP: 116.3 SEC (ref 24.7–36)
APTT PPP: 27 SEC (ref 24.7–36)
AST SERPL-CCNC: 32 U/L (ref 12–45)
BASOPHILS # BLD AUTO: 0.4 % (ref 0–1.8)
BASOPHILS # BLD: 0.04 K/UL (ref 0–0.12)
BILIRUB SERPL-MCNC: 0.6 MG/DL (ref 0.1–1.5)
BUN SERPL-MCNC: 11 MG/DL (ref 8–22)
CALCIUM ALBUM COR SERPL-MCNC: 9 MG/DL (ref 8.5–10.5)
CALCIUM SERPL-MCNC: 9.3 MG/DL (ref 8.5–10.5)
CHLORIDE SERPL-SCNC: 103 MMOL/L (ref 96–112)
CO2 SERPL-SCNC: 20 MMOL/L (ref 20–33)
CREAT SERPL-MCNC: 0.74 MG/DL (ref 0.5–1.4)
EKG IMPRESSION: NORMAL
EOSINOPHIL # BLD AUTO: 0.02 K/UL (ref 0–0.51)
EOSINOPHIL NFR BLD: 0.2 % (ref 0–6.9)
ERYTHROCYTE [DISTWIDTH] IN BLOOD BY AUTOMATED COUNT: 48.7 FL (ref 35.9–50)
EST. AVERAGE GLUCOSE BLD GHB EST-MCNC: 148 MG/DL
GFR SERPLBLD CREATININE-BSD FMLA CKD-EPI: 79 ML/MIN/1.73 M 2
GLOBULIN SER CALC-MCNC: 2.5 G/DL (ref 1.9–3.5)
GLUCOSE BLD STRIP.AUTO-MCNC: 152 MG/DL (ref 65–99)
GLUCOSE BLD STRIP.AUTO-MCNC: 181 MG/DL (ref 65–99)
GLUCOSE BLD STRIP.AUTO-MCNC: 208 MG/DL (ref 65–99)
GLUCOSE SERPL-MCNC: 199 MG/DL (ref 65–99)
HBA1C MFR BLD: 6.8 % (ref 4–5.6)
HCT VFR BLD AUTO: 40.6 % (ref 37–47)
HGB BLD-MCNC: 13.5 G/DL (ref 12–16)
IMM GRANULOCYTES # BLD AUTO: 0.11 K/UL (ref 0–0.11)
IMM GRANULOCYTES NFR BLD AUTO: 1.2 % (ref 0–0.9)
INR PPP: 1.52 (ref 0.87–1.13)
INR PPP: 1.57 (ref 0.87–1.13)
LYMPHOCYTES # BLD AUTO: 0.76 K/UL (ref 1–4.8)
LYMPHOCYTES NFR BLD: 8 % (ref 22–41)
MAGNESIUM SERPL-MCNC: 1.6 MG/DL (ref 1.5–2.5)
MCH RBC QN AUTO: 29.8 PG (ref 27–33)
MCHC RBC AUTO-ENTMCNC: 33.3 G/DL (ref 32.2–35.5)
MCV RBC AUTO: 89.6 FL (ref 81.4–97.8)
MONOCYTES # BLD AUTO: 0.55 K/UL (ref 0–0.85)
MONOCYTES NFR BLD AUTO: 5.8 % (ref 0–13.4)
NEUTROPHILS # BLD AUTO: 8.03 K/UL (ref 1.82–7.42)
NEUTROPHILS NFR BLD: 84.4 % (ref 44–72)
NRBC # BLD AUTO: 0 K/UL
NRBC BLD-RTO: 0 /100 WBC (ref 0–0.2)
PLATELET # BLD AUTO: 247 K/UL (ref 164–446)
PMV BLD AUTO: 10.1 FL (ref 9–12.9)
POTASSIUM SERPL-SCNC: 4.3 MMOL/L (ref 3.6–5.5)
PROT SERPL-MCNC: 6.9 G/DL (ref 6–8.2)
PROTHROMBIN TIME: 18 SEC (ref 12–14.6)
PROTHROMBIN TIME: 18.5 SEC (ref 12–14.6)
RBC # BLD AUTO: 4.53 M/UL (ref 4.2–5.4)
SODIUM SERPL-SCNC: 138 MMOL/L (ref 135–145)
TROPONIN T SERPL-MCNC: 255 NG/L (ref 6–19)
TROPONIN T SERPL-MCNC: 629 NG/L (ref 6–19)
TROPONIN T SERPL-MCNC: 973 NG/L (ref 6–19)
TSH SERPL DL<=0.005 MIU/L-ACNC: 2.23 UIU/ML (ref 0.38–5.33)
UFH PPP CHRO-ACNC: 0.35 IU/ML
UFH PPP CHRO-ACNC: 0.67 IU/ML
UFH PPP CHRO-ACNC: <0.1 IU/ML
WBC # BLD AUTO: 9.5 K/UL (ref 4.8–10.8)

## 2023-07-12 PROCEDURE — 700111 HCHG RX REV CODE 636 W/ 250 OVERRIDE (IP): Mod: JZ | Performed by: NURSE PRACTITIONER

## 2023-07-12 PROCEDURE — 93312 ECHO TRANSESOPHAGEAL: CPT | Mod: 26 | Performed by: INTERNAL MEDICINE

## 2023-07-12 PROCEDURE — 770022 HCHG ROOM/CARE - ICU (200)

## 2023-07-12 PROCEDURE — 700102 HCHG RX REV CODE 250 W/ 637 OVERRIDE(OP): Performed by: HOSPITALIST

## 2023-07-12 PROCEDURE — 84484 ASSAY OF TROPONIN QUANT: CPT | Mod: 91

## 2023-07-12 PROCEDURE — 99291 CRITICAL CARE FIRST HOUR: CPT | Performed by: INTERNAL MEDICINE

## 2023-07-12 PROCEDURE — 700111 HCHG RX REV CODE 636 W/ 250 OVERRIDE (IP): Performed by: INTERNAL MEDICINE

## 2023-07-12 PROCEDURE — 96368 THER/DIAG CONCURRENT INF: CPT

## 2023-07-12 PROCEDURE — 51702 INSERT TEMP BLADDER CATH: CPT

## 2023-07-12 PROCEDURE — 85520 HEPARIN ASSAY: CPT

## 2023-07-12 PROCEDURE — 99152 MOD SED SAME PHYS/QHP 5/>YRS: CPT

## 2023-07-12 PROCEDURE — 700117 HCHG RX CONTRAST REV CODE 255: Performed by: EMERGENCY MEDICINE

## 2023-07-12 PROCEDURE — 99152 MOD SED SAME PHYS/QHP 5/>YRS: CPT | Performed by: INTERNAL MEDICINE

## 2023-07-12 PROCEDURE — B246ZZ4 ULTRASONOGRAPHY OF RIGHT AND LEFT HEART, TRANSESOPHAGEAL: ICD-10-PCS | Performed by: INTERNAL MEDICINE

## 2023-07-12 PROCEDURE — 93005 ELECTROCARDIOGRAM TRACING: CPT | Performed by: EMERGENCY MEDICINE

## 2023-07-12 PROCEDURE — 82962 GLUCOSE BLOOD TEST: CPT

## 2023-07-12 PROCEDURE — 92960 CARDIOVERSION ELECTRIC EXT: CPT

## 2023-07-12 PROCEDURE — 96365 THER/PROPH/DIAG IV INF INIT: CPT

## 2023-07-12 PROCEDURE — 93325 DOPPLER ECHO COLOR FLOW MAPG: CPT

## 2023-07-12 PROCEDURE — 700105 HCHG RX REV CODE 258: Performed by: EMERGENCY MEDICINE

## 2023-07-12 PROCEDURE — 700111 HCHG RX REV CODE 636 W/ 250 OVERRIDE (IP): Performed by: HOSPITALIST

## 2023-07-12 PROCEDURE — 96366 THER/PROPH/DIAG IV INF ADDON: CPT

## 2023-07-12 PROCEDURE — 80053 COMPREHEN METABOLIC PANEL: CPT

## 2023-07-12 PROCEDURE — 99292 CRITICAL CARE ADDL 30 MIN: CPT | Performed by: INTERNAL MEDICINE

## 2023-07-12 PROCEDURE — A9270 NON-COVERED ITEM OR SERVICE: HCPCS | Performed by: HOSPITALIST

## 2023-07-12 PROCEDURE — 96375 TX/PRO/DX INJ NEW DRUG ADDON: CPT

## 2023-07-12 PROCEDURE — 93306 TTE W/DOPPLER COMPLETE: CPT

## 2023-07-12 PROCEDURE — 85730 THROMBOPLASTIN TIME PARTIAL: CPT

## 2023-07-12 PROCEDURE — 84443 ASSAY THYROID STIM HORMONE: CPT

## 2023-07-12 PROCEDURE — 5A2204Z RESTORATION OF CARDIAC RHYTHM, SINGLE: ICD-10-PCS | Performed by: INTERNAL MEDICINE

## 2023-07-12 PROCEDURE — 99223 1ST HOSP IP/OBS HIGH 75: CPT | Mod: 25 | Performed by: NURSE PRACTITIONER

## 2023-07-12 PROCEDURE — 99223 1ST HOSP IP/OBS HIGH 75: CPT | Mod: AI | Performed by: HOSPITALIST

## 2023-07-12 PROCEDURE — 71275 CT ANGIOGRAPHY CHEST: CPT

## 2023-07-12 PROCEDURE — 36415 COLL VENOUS BLD VENIPUNCTURE: CPT

## 2023-07-12 PROCEDURE — 83036 HEMOGLOBIN GLYCOSYLATED A1C: CPT

## 2023-07-12 PROCEDURE — 99153 MOD SED SAME PHYS/QHP EA: CPT

## 2023-07-12 PROCEDURE — 99285 EMERGENCY DEPT VISIT HI MDM: CPT

## 2023-07-12 PROCEDURE — 700111 HCHG RX REV CODE 636 W/ 250 OVERRIDE (IP)

## 2023-07-12 PROCEDURE — 85025 COMPLETE CBC W/AUTO DIFF WBC: CPT

## 2023-07-12 PROCEDURE — 85610 PROTHROMBIN TIME: CPT

## 2023-07-12 PROCEDURE — 92960 CARDIOVERSION ELECTRIC EXT: CPT | Performed by: INTERNAL MEDICINE

## 2023-07-12 PROCEDURE — 83735 ASSAY OF MAGNESIUM: CPT

## 2023-07-12 PROCEDURE — 303105 HCHG CATHETER EXTRA

## 2023-07-12 PROCEDURE — 700101 HCHG RX REV CODE 250

## 2023-07-12 PROCEDURE — 700111 HCHG RX REV CODE 636 W/ 250 OVERRIDE (IP): Mod: JZ | Performed by: EMERGENCY MEDICINE

## 2023-07-12 RX ORDER — HEPARIN SODIUM 5000 [USP'U]/100ML
0-30 INJECTION, SOLUTION INTRAVENOUS CONTINUOUS
Status: DISCONTINUED | OUTPATIENT
Start: 2023-07-12 | End: 2023-07-15

## 2023-07-12 RX ORDER — BISACODYL 10 MG
10 SUPPOSITORY, RECTAL RECTAL
Status: DISCONTINUED | OUTPATIENT
Start: 2023-07-12 | End: 2023-07-17 | Stop reason: HOSPADM

## 2023-07-12 RX ORDER — LIDOCAINE HYDROCHLORIDE 20 MG/ML
INJECTION, SOLUTION INFILTRATION; PERINEURAL
Status: COMPLETED
Start: 2023-07-12 | End: 2023-07-12

## 2023-07-12 RX ORDER — METOPROLOL TARTRATE 100 MG/1
100 TABLET ORAL EVERY MORNING
Status: ON HOLD | COMMUNITY
End: 2023-07-17

## 2023-07-12 RX ORDER — HEPARIN SODIUM 1000 [USP'U]/ML
INJECTION, SOLUTION INTRAVENOUS; SUBCUTANEOUS
Status: COMPLETED
Start: 2023-07-12 | End: 2023-07-12

## 2023-07-12 RX ORDER — ASPIRIN 300 MG/1
300 SUPPOSITORY RECTAL DAILY
Status: DISCONTINUED | OUTPATIENT
Start: 2023-07-13 | End: 2023-07-13

## 2023-07-12 RX ORDER — MAGNESIUM SULFATE HEPTAHYDRATE 40 MG/ML
4 INJECTION, SOLUTION INTRAVENOUS ONCE
Status: COMPLETED | OUTPATIENT
Start: 2023-07-12 | End: 2023-07-12

## 2023-07-12 RX ORDER — ASPIRIN 81 MG/1
324 TABLET, CHEWABLE ORAL DAILY
Status: DISCONTINUED | OUTPATIENT
Start: 2023-07-13 | End: 2023-07-13

## 2023-07-12 RX ORDER — ONDANSETRON 4 MG/1
4 TABLET, ORALLY DISINTEGRATING ORAL EVERY 4 HOURS PRN
Status: DISCONTINUED | OUTPATIENT
Start: 2023-07-12 | End: 2023-07-17 | Stop reason: HOSPADM

## 2023-07-12 RX ORDER — SENNOSIDES 8.6 MG
1300 CAPSULE ORAL EVERY 6 HOURS PRN
COMMUNITY

## 2023-07-12 RX ORDER — HEPARIN SODIUM 1000 [USP'U]/ML
30 INJECTION, SOLUTION INTRAVENOUS; SUBCUTANEOUS PRN
Status: DISCONTINUED | OUTPATIENT
Start: 2023-07-12 | End: 2023-07-15

## 2023-07-12 RX ORDER — HEPARIN SODIUM 1000 [USP'U]/ML
4000 INJECTION, SOLUTION INTRAVENOUS; SUBCUTANEOUS ONCE
Status: DISCONTINUED | OUTPATIENT
Start: 2023-07-12 | End: 2023-07-12

## 2023-07-12 RX ORDER — B-COMPLEX WITH VITAMIN C
1 TABLET ORAL EVERY MORNING
COMMUNITY

## 2023-07-12 RX ORDER — AMOXICILLIN 250 MG
2 CAPSULE ORAL 2 TIMES DAILY
Status: DISCONTINUED | OUTPATIENT
Start: 2023-07-12 | End: 2023-07-17 | Stop reason: HOSPADM

## 2023-07-12 RX ORDER — FUROSEMIDE 10 MG/ML
20 INJECTION INTRAMUSCULAR; INTRAVENOUS ONCE
Status: COMPLETED | OUTPATIENT
Start: 2023-07-12 | End: 2023-07-12

## 2023-07-12 RX ORDER — POLYETHYLENE GLYCOL 3350 17 G/17G
1 POWDER, FOR SOLUTION ORAL
Status: DISCONTINUED | OUTPATIENT
Start: 2023-07-12 | End: 2023-07-17 | Stop reason: HOSPADM

## 2023-07-12 RX ORDER — ACETAMINOPHEN 325 MG/1
650 TABLET ORAL EVERY 6 HOURS PRN
Status: DISCONTINUED | OUTPATIENT
Start: 2023-07-12 | End: 2023-07-17 | Stop reason: HOSPADM

## 2023-07-12 RX ORDER — LEVOTHYROXINE SODIUM 0.05 MG/1
50 TABLET ORAL 2 TIMES DAILY
Status: DISCONTINUED | OUTPATIENT
Start: 2023-07-12 | End: 2023-07-17 | Stop reason: HOSPADM

## 2023-07-12 RX ORDER — ASPIRIN 81 MG/1
TABLET, CHEWABLE ORAL
Status: COMPLETED
Start: 2023-07-12 | End: 2023-07-12

## 2023-07-12 RX ORDER — HYDRALAZINE HYDROCHLORIDE 20 MG/ML
10 INJECTION INTRAMUSCULAR; INTRAVENOUS EVERY 6 HOURS PRN
Status: DISCONTINUED | OUTPATIENT
Start: 2023-07-12 | End: 2023-07-12

## 2023-07-12 RX ORDER — VERAPAMIL HYDROCHLORIDE 2.5 MG/ML
INJECTION, SOLUTION INTRAVENOUS
Status: COMPLETED
Start: 2023-07-12 | End: 2023-07-12

## 2023-07-12 RX ORDER — NITROGLYCERIN 20 MG/100ML
0-200 INJECTION INTRAVENOUS CONTINUOUS
Status: DISCONTINUED | OUTPATIENT
Start: 2023-07-12 | End: 2023-07-15

## 2023-07-12 RX ORDER — HEPARIN SODIUM 1000 [USP'U]/ML
4000 INJECTION, SOLUTION INTRAVENOUS; SUBCUTANEOUS ONCE
Status: COMPLETED | OUTPATIENT
Start: 2023-07-12 | End: 2023-07-12

## 2023-07-12 RX ORDER — ASPIRIN 325 MG
325 TABLET ORAL DAILY
Status: DISCONTINUED | OUTPATIENT
Start: 2023-07-13 | End: 2023-07-13

## 2023-07-12 RX ORDER — MIDAZOLAM HYDROCHLORIDE 1 MG/ML
INJECTION INTRAMUSCULAR; INTRAVENOUS
Status: COMPLETED
Start: 2023-07-12 | End: 2023-07-12

## 2023-07-12 RX ORDER — SIMVASTATIN 20 MG
40 TABLET ORAL EVERY EVENING
Status: DISCONTINUED | OUTPATIENT
Start: 2023-07-12 | End: 2023-07-13

## 2023-07-12 RX ORDER — HEPARIN SODIUM 200 [USP'U]/100ML
INJECTION, SOLUTION INTRAVENOUS
Status: COMPLETED
Start: 2023-07-12 | End: 2023-07-12

## 2023-07-12 RX ORDER — LEVOTHYROXINE SODIUM 0.05 MG/1
50 TABLET ORAL 2 TIMES DAILY
COMMUNITY

## 2023-07-12 RX ORDER — ONDANSETRON 2 MG/ML
4 INJECTION INTRAMUSCULAR; INTRAVENOUS EVERY 4 HOURS PRN
Status: DISCONTINUED | OUTPATIENT
Start: 2023-07-12 | End: 2023-07-17 | Stop reason: HOSPADM

## 2023-07-12 RX ADMIN — HEPARIN SODIUM 4000 UNITS: 1000 INJECTION, SOLUTION INTRAVENOUS; SUBCUTANEOUS at 08:16

## 2023-07-12 RX ADMIN — SIMVASTATIN 40 MG: 20 TABLET, FILM COATED ORAL at 17:27

## 2023-07-12 RX ADMIN — ONDANSETRON 4 MG: 4 TABLET, ORALLY DISINTEGRATING ORAL at 16:29

## 2023-07-12 RX ADMIN — DILTIAZEM HYDROCHLORIDE 5 MG/HR: 5 INJECTION INTRAVENOUS at 07:01

## 2023-07-12 RX ADMIN — LEVOTHYROXINE SODIUM 50 MCG: 0.05 TABLET ORAL at 17:36

## 2023-07-12 RX ADMIN — FENTANYL CITRATE 125 MCG: 50 INJECTION, SOLUTION INTRAMUSCULAR; INTRAVENOUS at 14:09

## 2023-07-12 RX ADMIN — IOHEXOL 47 ML: 350 INJECTION, SOLUTION INTRAVENOUS at 08:30

## 2023-07-12 RX ADMIN — METOPROLOL TARTRATE 25 MG: 25 TABLET, FILM COATED ORAL at 17:27

## 2023-07-12 RX ADMIN — INSULIN HUMAN 3 UNITS: 100 INJECTION, SOLUTION PARENTERAL at 17:35

## 2023-07-12 RX ADMIN — INSULIN HUMAN 3 UNITS: 100 INJECTION, SOLUTION PARENTERAL at 19:59

## 2023-07-12 RX ADMIN — MAGNESIUM SULFATE IN WATER 4 G: 40 INJECTION, SOLUTION INTRAVENOUS at 15:15

## 2023-07-12 RX ADMIN — HEPARIN SODIUM 12 UNITS/KG/HR: 5000 INJECTION, SOLUTION INTRAVENOUS at 08:20

## 2023-07-12 RX ADMIN — NITROGLYCERIN 10 ML: 20 INJECTION INTRAVENOUS at 13:11

## 2023-07-12 RX ADMIN — HEPARIN SODIUM 2000 UNITS: 200 INJECTION, SOLUTION INTRAVENOUS at 13:12

## 2023-07-12 RX ADMIN — MIDAZOLAM 2.5 MG: 1 INJECTION INTRAMUSCULAR; INTRAVENOUS at 14:08

## 2023-07-12 RX ADMIN — LIDOCAINE HYDROCHLORIDE: 20 INJECTION, SOLUTION INFILTRATION; PERINEURAL at 13:11

## 2023-07-12 RX ADMIN — SENNOSIDES AND DOCUSATE SODIUM 2 TABLET: 50; 8.6 TABLET ORAL at 17:27

## 2023-07-12 RX ADMIN — HEPARIN SODIUM: 1000 INJECTION, SOLUTION INTRAVENOUS; SUBCUTANEOUS at 13:12

## 2023-07-12 RX ADMIN — NITROGLYCERIN 10 MCG/MIN: 20 INJECTION INTRAVENOUS at 12:13

## 2023-07-12 RX ADMIN — FUROSEMIDE 20 MG: 20 INJECTION, SOLUTION INTRAMUSCULAR; INTRAVENOUS at 12:17

## 2023-07-12 RX ADMIN — VERAPAMIL HYDROCHLORIDE 5 MG: 2.5 INJECTION, SOLUTION INTRAVENOUS at 13:11

## 2023-07-12 ASSESSMENT — ENCOUNTER SYMPTOMS
SHORTNESS OF BREATH: 1
BACK PAIN: 0
DIARRHEA: 0
PALPITATIONS: 1
BLURRED VISION: 0
DIZZINESS: 0
SORE THROAT: 0
FEVER: 0
CHILLS: 0
ORTHOPNEA: 1
ABDOMINAL PAIN: 0
HEADACHES: 0
LOSS OF CONSCIOUSNESS: 0
COUGH: 0
VOMITING: 0
DOUBLE VISION: 0
NAUSEA: 0

## 2023-07-12 ASSESSMENT — COGNITIVE AND FUNCTIONAL STATUS - GENERAL
MOBILITY SCORE: 11
PERSONAL GROOMING: A LITTLE
HELP NEEDED FOR BATHING: A LOT
SUGGESTED CMS G CODE MODIFIER MOBILITY: CL
DRESSING REGULAR UPPER BODY CLOTHING: A LITTLE
SUGGESTED CMS G CODE MODIFIER DAILY ACTIVITY: CK
DAILY ACTIVITIY SCORE: 17
STANDING UP FROM CHAIR USING ARMS: A LOT
CLIMB 3 TO 5 STEPS WITH RAILING: TOTAL
WALKING IN HOSPITAL ROOM: TOTAL
DRESSING REGULAR LOWER BODY CLOTHING: A LOT
TOILETING: A LITTLE
TURNING FROM BACK TO SIDE WHILE IN FLAT BAD: A LITTLE
MOVING TO AND FROM BED TO CHAIR: A LOT
MOVING FROM LYING ON BACK TO SITTING ON SIDE OF FLAT BED: A LOT

## 2023-07-12 ASSESSMENT — COPD QUESTIONNAIRES
DURING THE PAST 4 WEEKS HOW MUCH DID YOU FEEL SHORT OF BREATH: NONE/LITTLE OF THE TIME
HAVE YOU SMOKED AT LEAST 100 CIGARETTES IN YOUR ENTIRE LIFE: NO/DON'T KNOW
COPD SCREENING SCORE: 2
DO YOU EVER COUGH UP ANY MUCUS OR PHLEGM?: NO/ONLY WITH OCCASIONAL COLDS OR INFECTIONS

## 2023-07-12 ASSESSMENT — LIFESTYLE VARIABLES: EVER_SMOKED: NEVER

## 2023-07-12 ASSESSMENT — HEART SCORE
RISK FACTORS: >2 RISK FACTORS OR HX OF ATHEROSCLEROTIC DISEASE
HISTORY: MODERATELY SUSPICIOUS
AGE: 65+
TROPONIN: GREATER THAN OR EQUAL TO 3 TIMES NORMAL LIMIT
HEART SCORE: 8
ECG: NON-SPECIFIC REPOLARIZATION DISTURBANCE

## 2023-07-12 ASSESSMENT — PAIN DESCRIPTION - PAIN TYPE
TYPE: ACUTE PAIN

## 2023-07-12 ASSESSMENT — FIBROSIS 4 INDEX: FIB4 SCORE: 3.11

## 2023-07-12 NOTE — ED TRIAGE NOTES
Chief Complaint   Patient presents with    Chest Pain     That started at 3 am associated with nausea  A referral from [facility]  Pt has Afib on RVR     Treatment given:    - Adenosine 6 mg the 12 mg IV  - Metoprolol 15 mg IV  - Normal Saline 1 Liter IV  - Diltiazem 10 mg IV bolus then 5 mg/ hour infusion  - Nitroglycerine 1 tab SL  - Aspirin 324 mg tab PO    Alert and Oriented x 4           Brought by [facility] with the above complaints  IV cannula gauge 20 left hand, gauge 20 right AC    Pain:4/10 pressure like chest pain      Vitals:    07/12/23 0648   BP: (!) 189/88   Pulse: (!) 115   Resp: 19   Temp: 36.8 °C (98.2 °F)   SpO2: 95%

## 2023-07-12 NOTE — ED PROVIDER NOTES
ED Provider Note    Scribed for Elvia Hardwick M.D. by Galo Cole. 7/12/2023, 6:44 AM.    Primary care provider: Patient reports no primary care provider.   Means of arrival: EMS  History obtained from: EMS and Patient  History limited by: No limitations noted.    CHIEF COMPLAINT  Chief Complaint   Patient presents with    Chest Pain     That started at 3 am associated with nausea  A referral from [facility]  Pt has Afib on RVR     Treatment given:    - Adenosine 6 mg the 12 mg IV  - Metoprolol 15 mg IV  - Normal Saline 1 Liter IV  - Diltiazem 10 mg IV bolus then 5 mg/ hour infusion  - Nitroglycerine 1 tab SL  - Aspirin 324 mg tab PO    Alert and Oriented x 4         HPI/ROS  Nancy King is a 87 y.o. female who presents to the Emergency Department via [facility] complaining of chest pain onset this morning. She reports that the pain woke her up, but compared to this morning the chest pain is significantly improved.  She initially presented to outside facility where she was thought to be in SVT with a heart rate in the 180s.  For her chest pain she was treated with aspirin and nitroglycerin and given 3 doses of adenosine.  No improvement in her rate with this.  However when her rate did slow down it seemed that she may have been in atrial flutter and therefore metoprolol was tried x3 doses (15 mg total) without improvement in her rate.  Subsequently patient was given diltiazem bolus with improvement in her rate and started on a diltiazem drip.  Per EMS patient's heart rate has been in the 110-120 range on the diltiazem drip, patient reports that she is feeling greatly improved after rate control.  Patient states that her chest pain has now mostly resolved.  Patient has no known history of atrial fibrillation or flutter.  She does have a history of CAD with an abnormal ischemia scan a year ago.    EMS reports an extensive cardiac history with angina including a 30% blockage, DVT, PE, CAD, CHF, diabetes,  hypothyroidism, and a sleep disorder. She is currently on coumadin for history of DVT/PE. She reports that she was seen by Dr. Obrien (Cardiology) but had a disagreement with cardiac catheterization and therefore did not proceed with it.     EXTERNAL RECORDS REVIEWED  Transfer records were reviewed which showed that she has a history of diabetes, hypertension, CAD, PE, and DVT. She is coumadin as well as losartan and metoprolol. Labs were reviewed which were notable for mild leukocytosis of 11.6; INR was subtherapeutic at 1.7; troponin was mildly elevated at 0.05; potassium and sodium levels were within normal limits; renal function was within normal limits.     Record review from our system demonstrates patient was seen by Dr. Obrien (Cardiology) June 2022 in which she had an abnormal myocardial profusion scan and was supposed to proceed with cardiac catheterization.  Ultimately patient did not have this done.    LIMITATION TO HISTORY   None    OUTSIDE HISTORIAN(S):  EMS, see above HPI    PAST MEDICAL HISTORY   has a past medical history of Acute venous embolism and thrombosis of unspecified deep vessels of lower extremity, Angina pectoris (5/11/2012), Benign essential HTN (5/11/2012), CAD (coronary artery disease) (5/11/2012), Cancer (HCC) (), Chronic anticoagulation (5/11/2012), Heart burn, Hyperlipidemia (5/11/2012), Hyperlipoproteinemia (5/11/2012), Indigestion, Obesity (5/11/2012), Other specified disorder of intestines, Pulmonary embolism (HCC), Unspecified cataract, Unspecified disorder of thyroid, and Unspecified urinary incontinence.    SURGICAL HISTORY   has a past surgical history that includes hysterectomy, total abdominal; tonsillectomy and adenoidectomy; cholecystectomy; other; appendectomy; mastectomy (1/12/2015); node biopsy sentinel (1/12/2015); and axillary node dissection (1/12/2015).    SOCIAL HISTORY  Social History     Tobacco Use    Smoking status: Never    Smokeless tobacco: Never  "  Vaping Use    Vaping Use: Never used   Substance Use Topics    Alcohol use: Yes     Comment: \"Couple drinks every night\"    Drug use: No      Social History     Substance and Sexual Activity   Drug Use No     FAMILY HISTORY  Family History   Problem Relation Age of Onset    Heart Disease Mother     Heart Disease Father      CURRENT MEDICATIONS  Coumadin, losartan, and metoprolol    ALLERGIES  Allergies   Allergen Reactions    Sulfa Drugs      Swelling    Levofloxacin Rash     Rash       PHYSICAL EXAM  VITAL SIGNS: Ht 1.651 m (5' 5\")   Wt 83.9 kg (185 lb)   BMI 30.79 kg/m²  heart rate 111  Vitals reviewed by myself.  Nursing note and vitals reviewed.  Constitutional: Well-developed and well-nourished. Mild distress.   HENT: Head is normocephalic and atraumatic.  Eyes: extra-ocular movements intact  Cardiovascular: Regular rate and regular rhythm. No murmur heard.  Pulmonary/Chest: Breath sounds normal. No wheezes or rales.   Abdominal: Soft and non-tender. No distention.    Musculoskeletal: Extremities exhibit normal range of motion without edema or tenderness.   Neurological: Awake and alert  Skin: Skin is warm and dry. No rash.     DIAGNOSTIC STUDIES:  LABS  Labs Reviewed   CBC WITH DIFFERENTIAL - Abnormal; Notable for the following components:       Result Value    Neutrophils-Polys 84.40 (*)     Lymphocytes 8.00 (*)     Immature Granulocytes 1.20 (*)     Neutrophils (Absolute) 8.03 (*)     Lymphs (Absolute) 0.76 (*)     All other components within normal limits    Narrative:     Biotin intake of greater than 5 mg per day may interfere with  troponin levels, causing false low values.   COMP METABOLIC PANEL - Abnormal; Notable for the following components:    Glucose 199 (*)     Alkaline Phosphatase 143 (*)     All other components within normal limits    Narrative:     Biotin intake of greater than 5 mg per day may interfere with  troponin levels, causing false low values.   TROPONIN - Abnormal; Notable for " the following components:    Troponin T 255 (*)     All other components within normal limits    Narrative:     Biotin intake of greater than 5 mg per day may interfere with  troponin levels, causing false low values.   PROTHROMBIN TIME - Abnormal; Notable for the following components:    PT 18.5 (*)     INR 1.57 (*)     All other components within normal limits    Narrative:     Indicate which anticoagulants the patient is on:->HEPARIN   CORRECTED CALCIUM    Narrative:     Biotin intake of greater than 5 mg per day may interfere with  troponin levels, causing false low values.   ESTIMATED GFR    Narrative:     Biotin intake of greater than 5 mg per day may interfere with  troponin levels, causing false low values.   APTT    Narrative:     Indicate which anticoagulants the patient is on:->HEPARIN   HEPARIN XA (UNFRACTIONATED)    Narrative:     Indicate which anticoagulants the patient is on:->HEPARIN   TROPONIN    Narrative:     Biotin intake of greater than 5 mg per day may interfere with  troponin levels, causing false low values.   All labs reviewed and independently interpreted by myself    EKG Interpretation:    12 Lead EKG independently interpreted by myself to show:  EKG at 6:39 AM demonstrates atrial flutter at a rate of 113, normal axis, normal intervals, QRS 85, QTc 384, no acute ST elevation, cannot evaluate for ST depressions given flutter waves, difficult to assess for ischemia in setting of a flutter, does not meet STEMI criteria, a-flutter is new arrhythmia for patient    RADIOLOGY  Images independently interpreted by myself prior to radiologist review:  - no massive or submassive PE per my interpretation  Final interpretation by radiology demonstrates:  CT-CTA CHEST PULMONARY ARTERY W/ RECONS   Final Result      1.  No evidence of pulmonary embolism.   2.  Mild interstitial pulmonary edema.   3.  Atherosclerosis including coronary artery disease.            The radiologist's interpretation of all  radiological studies have been reviewed by me.    COURSE & MEDICAL DECISION MAKING    ED Observation Status? Yes; I am placing the patient in to an observation status due to a diagnostic uncertainty as well as therapeutic intensity. Patient placed in observation status at 6:47 AM, 7/12/2023.     Observation plan is as follows: Serum studies, CT imaging, and cardiac monitoring.    Upon Reevaluation, the patient's condition has: not improved; and will be escalated to hospitalization.    Patient discharged from ED Observation status at 8:30 AM 7/12/2023.      INITIAL ASSESSMENT AND PLAN    Patient is a 87-year-old female who presents for evaluation of chest pain.  Differential diagnosis includes arrhythmia, ischemia, ACS, electrolyte derangement, dehydration, pulmonary embolism.  Diagnostic work-up includes repeat labs, outside facility her INR was subtherapeutic and she has a new tachyarrhythmia, therefore will obtain a CTA of the chest as well to assess for pulmonary embolism.       REASSESSMENTS   6:44 AM - Patient was seen and evaluated at bedside. I discussed differential diagnoses as stated above with the patient. Patient was given an opportunity to ask questions. Patient will be treated for irregular rhythm. Patient verbalizes understanding and agreement to this plan of care.     7:23 AM - Patient was seen and reevaluated at bedside. Patient is reporting decreased chest pain. I informed the patient that we are still waiting on their cardiac work up and CT imaging. I advised the patient on their risks of blood clots given their currently resulted lab results and discussed the plan for admission. Patient verbalizes understanding and agreement to this plan of care.      7:46 AM - Patient was seen at bedside. Patient was informed of the need to start a heparin drip for a NSTEMI. Patient verbalizes understanding and agreement to this plan of care. Patient has a heart rate in the 110s and a systolic blood pressure in  the 160s.    8:08 AM - Patient was seen at bedside. Patient reports decreased pain after medications. Patient's  was at bedside, so he was informed of current plan of care. I discussed the plan for consult with cardiology. Patient verbalizes understanding and agreement to this plan of care.      8:41 AM I discussed the patient's case and the above findings with Dr. Ramos (Cardiology) who advised to maintain the heparin drip and keep her NPO until they develop a more concrete plan.     9:01 AM I discussed the patient's case and the above findings with Dr. Stout (Intensivist) who agreed to evaluate the patient.     9:35 AM: Discussed case with Dr. Welch who will hospitalize patient for ongoing management    9:45 AM: Discussed case with cardiology at bedside as patient's repeat troponin is 629, plan is for emergent catheterization    ER COURSE AND FINAL DISPOSITION   Patient's initial vitals are notable for tachycardia, EKG demonstrates atrial flutter, no acute ST elevation.  Patient's diltiazem drip is resumed at 5 mg however she continues to be tachycardic and hypertensive and drip requires titration up to 10 mg in the emergency department.    Labs returned and are independently interpreted by myself to demonstrate:  -Troponin of 255, HEART score 8.  Troponin elevation may be related to tachyarrhythmia, however given her history of abnormal perfusion scan underlying ischemia cannot be ruled out.  Therefore heparin drip was initiated by myself for NSTEMI, cardiology consulted.  -No leukocytosis, no evidence of infection  -Electrolytes and renal function are within normal limits  -Labs otherwise unremarkable    CT returns and demonstrates no evidence of pulmonary embolism.  I discussed the case with cardiology who agrees with heparin drip, in the meantime we will leave patient n.p.o. for possible cardiac catheterization, they will come down to evaluate the patient emergently.    As patient is requiring  titratable diltiazem drip and is also on heparin drip for NSTEMI I believe she will require hospitalization to the intermediate care unit for ongoing management.  I discussed the case with intensivist Dr. Stout who evaluated the patient and agrees to this plan.  After evaluating patient he agrees that she requires IMCU, discussed the case with Dr. Welch who will hospitalize patient for ongoing management.    Repeat troponin is 629, cardiology team at bedside.  After discussion with patient and them, plan is for urgent catheterization given rising troponin and history of CAD.  Patient is hospitalized in critical condition.    ADDITIONAL PROBLEM LIST AND RESOURCE UTILIZATION    Additional problems aside from the chief complaint that I have addressed: none    I have discussed management of the patient with the following physicians and JORI's: Dr. Ramos (Cardiology), Dr. Stout (Intensivist)    Discussion of management with other [facility] or appropriate source(s): Pharmacy to initiate heparin drip      Escalation of care considered, and ultimately not performed: See above.     Barriers to care at this time, including but not limited to: none.     Decision tools and prescription drugs considered including, but not limited to: HEART Score 8 .    Please see review of records as noted above    DISPOSITION:  Patient will be hospitalized by Dr. Stout (Intensivist) in critical condition.    CRITICAL CARE  The very real possibilty of a deterioration of this patient's condition required the highest level of my preparedness for sudden, emergent intervention.  I provided critical care services, which included medication orders, frequent reevaluations of the patient's condition and response to treatment, ordering and reviewing test results, and discussing the case with various consultants.  The critical care time associated with the care of the patient was 45 minutes. Review chart for interventions. This time is exclusive of any  other billable procedures.    FINAL IMPRESSION  1. Chest pain, unspecified type    2. NSTEMI (non-ST elevated myocardial infarction) (HCC)    3. Atrial fibrillation, rapid (HCC)       Galo GROVE (Scribe), am scribing for, and in the presence of, Elvia Hardwick M.D..    Electronically signed by: Galo Cole (Scribe), 7/12/2023    Elvia GROVE M.D. personally performed the services described in this documentation, as scribed by Galo Cole in my presence, and it is both accurate and complete.     The note accurately reflects work and decisions made by me.  Elvia Hardwick M.D.  7/12/2023  10:43 AM

## 2023-07-12 NOTE — ASSESSMENT & PLAN NOTE
Distant history of  Patient has an IVC filter in place, and is maintained on Coumadin as outpt  Have started Apixaban and will plan to DC on that  IVC reviewed with Vasc; given how long it's been in place the recommend leaving it

## 2023-07-12 NOTE — CONSULTS
[facility] Cardiology Consultation Note    Name:   Nancy King   YOB: 1935  Age:   87 y.o.  female   MRN:   [id number]    Consulting Cardiologist: Dr. Ramos    Primary Cardiologist: Dr. 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 Obrien in June 2022. At that she had a NM stress test with a moderate sized area of ischemia in the apex. LHC was recommended, however, she was hesitant and did not schedule procedure.   Her anginal symptoms have been increasing in frequency and severity over the past month. She awoke last night to severe CP that prompted here to come to the ED, accompanied by .  She has also had associated symptoms of bilateral LE swelling, and mild increase with SEYMOUR.  She denies palpitations, dizziness or syncopal episodes, orthopnea, PND and recent weight gain.   Her EKG shows Aflutter with ST depression in V3 and V4, trops elevated 255, 629.      ROS  Constitutional: + fatigue. Has not noticed weight increased.  Respiratory: Denies shortness of breath, no cough.  Cardiovascular: + chest pain, increasingly worse.  + lower extremity edema.  Denies orthopnea or PND.  : + polyuria, no dysuria.  GI:  Denies nausea/vomiting.  No abdominal distention.  Neuro:  Denies dizziness, syncope.  Hem/lymph: Denies easy bleeding/bruising.    MS: no arthralgias or myalgias.    All other review of systems reviewed and negative.    Past Surgical History:   Procedure Laterality Date    MASTECTOMY  1/12/2015    Performed by Dwight Birch M.D. at SURGERY [facility] ORS    NODE BIOPSY SENTINEL  1/12/2015    Performed by Dwight Birch M.D. at SURGERY [facility] ORS    AXILLARY NODE DISSECTION  1/12/2015    Performed by Dwight Birch M.D. at SURGERY  [facility]    APPENDECTOMY      CHOLECYSTECTOMY      HYSTERECTOMY, TOTAL ABDOMINAL      OTHER      Bladder    TONSILLECTOMY AND ADENOIDECTOMY         Family History   Problem Relation Age of Onset    Heart Disease Mother     Heart Disease Father        Social History     Socioeconomic History    Marital status:      Spouse name: Not on file    Number of children: Not on file    Years of education: Not on file    Highest education level: Not on file   Occupational History    Not on file   Tobacco Use    Smoking status: Never    Smokeless tobacco: Never   Vaping Use    Vaping Use: Never used   Substance and Sexual Activity    Alcohol use: Yes     Comment: \"Couple drinks every night\"    Drug use: No    Sexual activity: Not on file   Other Topics Concern    Not on file   Social History Narrative    Not on file     Social Determinants of Health     Financial Resource Strain: Not on file   Food Insecurity: Not on file   Transportation Needs: Not on file   Physical Activity: Not on file   Stress: Not on file   Social Connections: Not on file   Intimate Partner Violence: Not on file   Housing Stability: Not on file       Medications / Drug list prior to admission:  No current facility-administered medications on file prior to encounter.     Current Outpatient Medications on File Prior to Encounter   Medication Sig Dispense Refill    levothyroxine (SYNTHROID) 50 MCG Tab Take 50 mcg by mouth 2 times a day.      metoprolol tartrate (LOPRESSOR) 100 MG Tab Take 100 mg by mouth every morning.      acetaminophen (TYLENOL 8 HOUR ARTHRITIS PAIN) 650 MG CR tablet Take 1,300 mg by mouth every 6 hours as needed for Moderate Pain.      B Complex Vitamins (VITAMIN B COMPLEX) Tab Take 1 Tablet by mouth every morning.      simvastatin (ZOCOR) 40 MG Tab TAKE 1 TABLET BY MOUTH IN  THE EVENING 90 Tablet 0    nitroglycerin (NITROSTAT) 0.4 MG SL Tab DISSOLVE 1 TABLET UNDER THE TONGUE EVERY 5 MINUTES AS  NEEDED FOR CHEST PAIN. MAX  " "OF 3 TABLETS IN 15 MINUTES. CALL 911 IF PAIN PERSISTS. 50 Tablet 7    JANUVIA 100 MG Tab TAKE 1 TABLET BY MOUTH  DAILY 90 Tablet 3    warfarin (COUMADIN) 1 MG Tab TAKE 2 AND 1/2 TABLETS BY  MOUTH DAILY AS DIRECTED BY  [facility] 225 Tablet 3       Allergies   Allergen Reactions    Sulfa Drugs      Swelling    Levofloxacin Rash     Rash         Physical Exam  Body mass index is 30.79 kg/m². BP (!) 166/89   Pulse (!) 111   Temp 36.8 °C (98.2 °F) (Temporal)   Resp 18   Ht 1.651 m (5' 5\")   Wt 83.9 kg (185 lb)   SpO2 95%    Vitals:    07/12/23 0855 07/12/23 0900 07/12/23 0915 07/12/23 0942   BP: (!) 203/95 (!) 203/95 (!) 183/87 (!) 166/89   Pulse: (!) 111 (!) 105 (!) 112 (!) 111   Resp: 18 20 20 18   Temp:       TempSrc:       SpO2: 96% 95% 94% 95%   Weight:       Height:        Oxygen Therapy:  Pulse Oximetry: 95 %, O2 Delivery Device: None - Room Air    General: no apparent distress, chronically ill appearing  Eyes: normal conjunctiva  ENT: OP clear  Neck: No JVD   Lungs: normal respiratory effort, without crackles, no wheezing or rhonchi.  Heart: tachycardia regular rhythm, no murmur, no rubs or gallops,   EXT: 1+ edema bilateral lower extremities. + bilateral pedal pulses. no cyanosis  Abdomen: soft, non tender, non distended,  Neurological: No focal deficits, no facial asymmetry.  Normal speech.  Psychiatric: Appropriate affect, alert and oriented x 3.   Skin: Warm extremities, no rash.    Labs (personally reviewed):     Lab Results   Component Value Date/Time    SODIUM 138 07/12/2023 06:50 AM    POTASSIUM 4.3 07/12/2023 06:50 AM    CHLORIDE 103 07/12/2023 06:50 AM    CO2 20 07/12/2023 06:50 AM    GLUCOSE 199 (H) 07/12/2023 06:50 AM    BUN 11 07/12/2023 06:50 AM    CREATININE 0.74 07/12/2023 06:50 AM     Lab Results   Component Value Date/Time    ALKPHOSPHAT 143 (H) 07/12/2023 06:50 AM    ASTSGOT 32 07/12/2023 06:50 AM    ALTSGPT 15 07/12/2023 06:50 AM    TBILIRUBIN 0.6 07/12/2023 06:50 " AM      Lab Results   Component Value Date/Time    CHOLSTRLTOT 172 06/15/2018 12:55 PM    LDL 80 06/15/2018 12:55 PM    HDL 63 06/15/2018 12:55 PM    TRIGLYCERIDE 147 06/15/2018 12:55 PM     Lab Results   Component Value Date/Time    BNPBTYPENAT 67 01/30/2015 02:06 PM     Cardiac Imaging and Procedures Review:      Personal Telemetry Review: Walter 100-110's    Personal EKG Interpretation: Walter 113    Echo 6/23/23:  CONCLUSIONS  Prior echo on 06/11/18, compared to the report of the prior study,   there has been development of mild aortic stenosis.  Normal left ventricular systolic function.   The left ventricular ejection fraction is visually estimated to be 60%.  Mild mitral regurgitation.  Mild aortic valve stenosis.  Vmax is 2.1 m/s. Transvalvular gradients are - Peak:  17 mmHg, Mean: 10   mmHg.   Mild tricuspid regurgitation.  Estimated right ventricular systolic pressure is 25 mmHg.    Stress Test 5/11/22:  NUCLEAR IMAGING INTERPRETATION   Normal left ventricular size, ejection fraction, and wall motion.   LV ejection fraction = 60%.   There is a moderate sized region of ischemia in the apex.   ECG INTERPRETATION   Negative stress ECG for ischemia.     Assessment and Medical Decision Making:    Walter  -Rates controlled with Diltiazem drip, currently on 15mg/hr  -Plan to cardiovert with SILVA given her subtherapeutic INR while on warfarin  -Mild CHF type symptoms, 1 dose IV furosemide prior to procedure    NSTEMI  Unstable angina   Elevated trops  -Continue Heparin drip  -[facility] with possible PCI  -Aspirin 325mg now, then 81mg daily  -HI Statin  -metop tart 25mg bid     The risks, benefits, and alternatives to coronary angiography with IV sedation were discussed in great detail. Specific risks mentioned include bleeding, infection, kidney damage, allergic reaction, cardiac perforation with possible tamponade requiring pericardiocentesis or possibly open heart surgery. In addition, we discussed that 10% of  patients will experience small to moderate bruising at the site of the arterial puncture. Lastly, the risks of heart attack, stroke and death were discussed; the risk of major complications such as heart attack or stroke caused by the angiogram is approximately 1%; the risk of death is approximately 1 in 1000. The patient verbalized understanding of the potential complications and wishes to proceed with this procedure.     The risks, benefits, and alternatives to electrical cardioversion were discussed in great detail. We discussed that conversion of atrial fibrillation to normal rhythm, at least transiently, is successful in 90 to 95% of patients. However, maintaining a normal rhythm depends on a number of factors, including underlying heart disease and antiarrhythmic medications. Atrial fibrillation often recurs with time and other treatments may be necessary. Risks of  cardioversion are low as long as anticoagulation issues are handled appropriately. There is a small (less than 1%) risk of embolic events, including stroke. Risks of electrical shock include mild muscle soreness and mild skin burning at the site of electrode placement. There is also a risk that cardioversion can stimulate more dangerous arrhythmias. The patient verbalized understanding of these potential complications and wishes to proceed with this procedure.    The risks, benefits, and alternatives to transesophageal echocardiogram with IV sedation were discussed with the patient in specific detail, including oropharyngeal and esophageal traumas including hoarseness and dysphagia after the procedure. Rare cases demonstrating serious or fatal complications associated with transesophageal echocardiogram have been reported in the adult population, including cardiac, pulmonary and bleeding complications in less than 1% of people. Patients with an identified intracardiac thrombus are at increased risk for embolic events and this appears to be reduced  with anticoagulant therapy. The patient verbalized understandings about these  possible complications and wishes to proceed with this procedure     Please see further attestation for additions and further recommendations. Plan was discussed concurrently with CICI Acosta  [facility]    7/12/2023                  '

## 2023-07-12 NOTE — ASSESSMENT & PLAN NOTE
Acute non-STEMI  Echocardiogram with LVEF of 55%  Cardiac catheterization on 7/13 with severe multivessel calcified CAD with subtotally occluded mid LAD  Cardiothoracic surgery has evaluated her and she is not a candidate for CABG  I am titrating a heparin drip based upon serial anti-Xa determinations  Continue aspirin and statin  Metoprolol succinate, 25 mg daily  Query medical management versus PCI - cardiology following

## 2023-07-12 NOTE — ED NOTES
Pharmacy Medication Reconciliation      ~Medication reconciliation updated and complete per patient & patient spouse at bedside with medication list. Reviewed list and returned at bedside   ~Allergies have been verified and updated   ~No oral ABX within the last 30 days  ~Patient home pharmacy :  [facility]      ~Patient spouse reports patient was off Warfarin for 4 weeks while in rehab and has only been back on Warfarin for the last 3-4 days.

## 2023-07-12 NOTE — H&P
Hospital Medicine History & Physical Note    Date of Service  7/12/2023    Primary Care Physician  Pcp Pt States None    Consultants  cardiology    Specialist Names: Radilescu      Code Status  Full Code    Chief Complaint  Chief Complaint   Patient presents with    Chest Pain     That started at 3 am associated with nausea  A referral from [facility]  Pt has Afib on RVR     Treatment given:    - Adenosine 6 mg the 12 mg IV  - Metoprolol 15 mg IV  - Normal Saline 1 Liter IV  - Diltiazem 10 mg IV bolus then 5 mg/ hour infusion  - Nitroglycerine 1 tab SL  - Aspirin 324 mg tab PO    Alert and Oriented x 4           History of Presenting Illness  Nancy King is a 87 y.o. 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  Of note the patient had an abnormal stress test about a year ago, and cardiac catheterization was recommended however she never presented to get this done.    Patient presents via care flight.  She presented to an outlying facility earlier this morning with chest pain.  She was noted to be in what was thought to be SVT with a rate around 180.  She was given 3 doses of adenosine to which she did not respond.  Subsequently it was given a beta-blocker and then diltiazem which did control her symptoms may and was sent to our facility.  Your EKG has demonstrated a flutter.  Diltiazem drip has been titrated and currently her rate has been brought down to around 100-1 10, the patient's chest pain has improved.  She has been started on heparin drip, and cardiology consulted.  She is going to the Cath Lab for cardioversion and left heart catheterization    On my examination the patient is feeling somewhat anxious due to all the occurrences from this morning, but.  She reports her chest pain is gone.   She states she has been having chest pain with emotional stress, and also with night as well as shortness of breath with exertion, and also at night.  The symptoms have been going on for some time and slowly getting worse.  She notes some leg edema, this is chronic but also worsening.    I discussed the plan of care with patient and family.    Review of Systems  Review of Systems   Constitutional:  Negative for chills and fever.   HENT:  Negative for nosebleeds and sore throat.    Eyes:  Negative for blurred vision and double vision.   Respiratory:  Positive for shortness of breath. Negative for cough.    Cardiovascular:  Positive for chest pain, palpitations, orthopnea and leg swelling.   Gastrointestinal:  Negative for abdominal pain, diarrhea, nausea and vomiting.   Genitourinary:  Negative for dysuria and urgency.   Musculoskeletal:  Negative for back pain.   Skin:  Negative for rash.   Neurological:  Negative for dizziness, loss of consciousness and headaches.       Past Medical History   has a past medical history of Acute venous embolism and thrombosis of unspecified deep vessels of lower extremity, Angina pectoris (5/11/2012), Benign essential HTN (5/11/2012), CAD (coronary artery disease) (5/11/2012), Cancer (HCC) (), Chronic anticoagulation (5/11/2012), Heart burn, Hyperlipidemia (5/11/2012), Hyperlipoproteinemia (5/11/2012), Indigestion, Obesity (5/11/2012), Other specified disorder of intestines, Pulmonary embolism (HCC), Unspecified cataract, Unspecified disorder of thyroid, and Unspecified urinary incontinence.    Surgical History   has a past surgical history that includes hysterectomy, total abdominal; tonsillectomy and adenoidectomy; cholecystectomy; other; appendectomy; mastectomy (1/12/2015); node biopsy sentinel (1/12/2015); and axillary node dissection (1/12/2015).     Family History  family history includes Heart Disease in her father and mother.   Family history reviewed with patient.  There is no family history that is pertinent to the chief complaint.     Social History   reports that she has never smoked. She has never used smokeless tobacco. She reports current alcohol use. She reports that she does not use drugs.    Allergies  Allergies   Allergen Reactions    Sulfa Drugs      Swelling    Levofloxacin Rash     Rash         Medications  Prior to Admission Medications   Prescriptions Last Dose Informant Patient Reported? Taking?   B Complex Vitamins (VITAMIN B COMPLEX) Tab 7/11/2023 at AM Significant Other Yes Yes   Sig: Take 1 Tablet by mouth every morning.   JANUVIA 100 MG Tab 7/11/2023 at PM Significant Other No No   Sig: TAKE 1 TABLET BY MOUTH  DAILY   acetaminophen (TYLENOL 8 HOUR ARTHRITIS PAIN) 650 MG CR tablet 7/9/2023 at PRN Significant Other Yes Yes   Sig: Take 1,300 mg by mouth every 6 hours as needed for Moderate Pain.   levothyroxine (SYNTHROID) 50 MCG Tab 7/11/2023 at PM Significant Other Yes Yes   Sig: Take 50 mcg by mouth 2 times a day.   metoprolol tartrate (LOPRESSOR) 100 MG Tab 7/11/2023 at AM Significant Other Yes Yes   Sig: Take 100 mg by mouth every morning.   nitroglycerin (NITROSTAT) 0.4 MG SL Tab 7/11/2023 at PM Significant Other No No   Sig: DISSOLVE 1 TABLET UNDER THE TONGUE EVERY 5 MINUTES AS  NEEDED FOR CHEST PAIN. MAX  OF 3 TABLETS IN 15 MINUTES. CALL 911 IF PAIN PERSISTS.   simvastatin (ZOCOR) 40 MG Tab 7/11/2023 at PM Significant Other No No   Sig: TAKE 1 TABLET BY MOUTH IN  THE EVENING   warfarin (COUMADIN) 1 MG Tab 7/11/2023 at PM Significant Other No No   Sig: TAKE 2 AND 1/2 TABLETS BY  MOUTH DAILY AS DIRECTED BY  [facility]      Facility-Administered Medications: None       Physical Exam  Temp:  [36.8 °C (98.2 °F)] 36.8 °C (98.2 °F)  Pulse:  [105-115] 111  Resp:  [18-20] 18  BP: (166-208)/() 166/89  SpO2:  [94 %-97 %] 95 %  Blood Pressure : (!) 166/89   Temperature: 36.8 °C (98.2 °F)   Pulse: (!) 111   Respiration: 18   Pulse  Oximetry: 95 %       Physical Exam  Constitutional:       General: She is not in acute distress.     Appearance: Normal appearance. She is well-developed. She is not diaphoretic.   HENT:      Head: Normocephalic and atraumatic.   Neck:      Vascular: No JVD.   Cardiovascular:      Rate and Rhythm: Tachycardia present. Rhythm irregular.      Heart sounds: Murmur heard.   Pulmonary:      Effort: Pulmonary effort is normal. No respiratory distress.      Breath sounds: No stridor. No wheezing or rales.   Abdominal:      Palpations: Abdomen is soft.      Tenderness: There is no abdominal tenderness. There is no guarding or rebound.   Musculoskeletal:         General: No tenderness.      Right lower leg: Edema present.      Left lower leg: Edema present.   Skin:     General: Skin is warm and dry.      Capillary Refill: Capillary refill takes less than 2 seconds.      Coloration: Skin is not jaundiced or pale.      Findings: No rash.   Neurological:      General: No focal deficit present.      Mental Status: She is alert and oriented to person, place, and time.   Psychiatric:         Mood and Affect: Mood normal.         Behavior: Behavior normal.         Thought Content: Thought content normal.         Laboratory:  Recent Labs     07/12/23  0650   WBC 9.5   RBC 4.53   HEMOGLOBIN 13.5   HEMATOCRIT 40.6   MCV 89.6   MCH 29.8   MCHC 33.3   RDW 48.7   PLATELETCT 247   MPV 10.1     Recent Labs     07/12/23  0650   SODIUM 138   POTASSIUM 4.3   CHLORIDE 103   CO2 20   GLUCOSE 199*   BUN 11   CREATININE 0.74   CALCIUM 9.3     Recent Labs     07/12/23  0650   ALTSGPT 15   ASTSGOT 32   ALKPHOSPHAT 143*   TBILIRUBIN 0.6   GLUCOSE 199*     Recent Labs     07/12/23  0650   APTT 27.0   INR 1.57*     No results for input(s): NTPROBNP in the last 72 hours.      Recent Labs     07/12/23  0650 07/12/23  0852   TROPONINT 255* 629*       Imaging:  CT-CTA CHEST PULMONARY ARTERY W/ RECONS   Final Result      1.  No evidence of pulmonary  embolism.   2.  Mild interstitial pulmonary edema.   3.  Atherosclerosis including coronary artery disease.            EC-ECHOCARDIOGRAM COMPLETE W/O CONT    (Results Pending)   CL-CARDIOVERSION    (Results Pending)   EC-SILVA W/ CONT    (Results Pending)   CL-LEFT HEART CATHETERIZATION WITH POSSIBLE INTERVENTION    (Results Pending)       X-Ray:  I have personally reviewed the images and compared with prior images.  EKG:  I have personally reviewed the images and compared with prior images.    Assessment/Plan:  Justification for Admission Status  I anticipate this patient will require at least two midnights for appropriate medical management, necessitating inpatient admission because NSTEMI A-fib with RVR    Patient will need a Intermediate Care (Adult and Pediatrics) bed on MEDICAL service .  The need is secondary to NSTEMI and a flutter with RVR.    * Atrial fibrillation with rapid ventricular response (HCC)- (present on admission)  Assessment & Plan  New diagnosis for the patient  Admit to telemetry  Plan electrical cardioversion  Had been on warfarin however we will transition to heparin drip acutely and plan DOAC on discharge  Check cardiac echo, TSH  Monitor and replace electrolytes  Cardiology involved    IVC (inferior vena cava obstruction)  Assessment & Plan  Please several years ago for history of DVT and PE  He is on full dose anticoagulation will go home on the same  We will discuss with radiology as regards advisability of removal though this is not emergent so probably best done as an outpatient in order to minimize cardiac risk    DM (diabetes mellitus) (HCC)  Assessment & Plan  Patient is maintained on sitagliptin  Cover sliding scale initially on admission  Would be a good candidate for an SGLT2 on discharge  Check A1c    Urinary incontinence  Assessment & Plan  Chronic since she had her children  Will need a Stone catheter for monitoring of urine output at least initially    NSTEMI (non-ST elevated  myocardial infarction) (HCC)  Assessment & Plan  Troponin on presentation in the 200s and has since climbed to close to 700  Patient is having ongoing symptoms with exertion as well as with her supraventricular tachycardia  Heparin drip  ASA  Admit to telemetry  Cardiology consulted  Going for left heart cath  High-dose statin  Probable DAPT if she has PCI stent    Hyperlipidemia- (present on admission)  Assessment & Plan  Check fasting lipid panel  High-dose statin    Pulmonary embolism (HCC)- (present on admission)  Assessment & Plan  Distant history of  Patient has an IVC filter in place, and is maintained on Coumadin.  Coumadin will be held, we are starting her on heparin protocol which we will titrate to 10 a goal  Would probably best be managed with a DOAC discharge    Chronic anticoagulation- (present on admission)  Assessment & Plan  On warfarin for history of DVT and PE  Would probably best be managed with a DOAC on discharge  We will use heparin therapeutic protocol in the interim    Benign essential HTN- (present on admission)  Assessment & Plan  Maintained on metoprolol 100 mg as an outpatient  Monitor closely and titrate up GDMT post-cath and dependent upon her vital signs        VTE prophylaxis: therapeutic anticoagulation with heparin

## 2023-07-12 NOTE — ASSESSMENT & PLAN NOTE
On chronic warfarin for history of recurrent PE/DVT  I am titrating a heparin drip based upon serial anti-Xa determinations

## 2023-07-12 NOTE — ASSESSMENT & PLAN NOTE
On chronic anticoagulation with warfarin  IVC filter in place  I am titrating a heparin drip based upon serial anti-Xa monitoring

## 2023-07-12 NOTE — PROGRESS NOTES
Critical Care Progress Note    Date of admission  7/12/2023    Chief Complaint  87 y.o. female admitted 7/12/2023 with atrial flutter with RVR, a hypertensive emergency and an acute NSTEMI.  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    Hospital Course      7/13 -    in sinus rhythm following SILVA cardioversion yesterday.  Titrating heparin drip.  Titrating nitroglycerin drip.  Continue aspirin and statin.  Continue metoprolol tartrate.  Awaiting cardiac catheterization.      Interval Problem Update  Reviewed last 24 hour events:      SB-SR  Heparin  Nitroglycerine  98.4      Review of Systems  Review of Systems   Unable to perform ROS: Acuity of condition        Vital Signs for last 24 hours   Temp:  [35.8 °C (96.4 °F)-36.9 °C (98.4 °F)] 36.9 °C (98.4 °F)  Pulse:  [] 57  Resp:  [11-57] 18  BP: ()/() 125/61  SpO2:  [89 %-99 %] 96 %    Hemodynamic parameters for last 24 hours       Respiratory Information for the last 24 hours       Physical Exam   Physical Exam  Constitutional:       Appearance: She is not diaphoretic.   HENT:      Head: Normocephalic.      Nose: Nose normal.      Mouth/Throat:      Pharynx: Oropharynx is clear.   Eyes:      General: No scleral icterus.     Pupils: Pupils are equal, round, and reactive to light.   Cardiovascular:      Comments: Sinus bradycardia-sinus rhythm  Pulmonary:      Breath sounds: Rales (Very few crackles at the bases) present. No wheezing.   Abdominal:      General: There is no distension.      Tenderness: There is no abdominal tenderness.   Musculoskeletal:      Cervical back: Normal range of motion.      Right lower leg: Edema present.      Left lower leg: Edema present.   Skin:     General: Skin is warm.      Capillary Refill: Capillary refill takes less than 2 seconds.   Neurological:       General: No focal deficit present.      Mental Status: She is oriented to person, place, and time.      Cranial Nerves: No cranial nerve deficit.         Medications  Current Facility-Administered Medications   Medication Dose Route Frequency Provider Last Rate Last Admin    dilTIAZem (Cardizem) 100 mg in dextrose 5% 100 mL Infusion  0-20 mg/hr Intravenous Continuous Elvia Hardwick M.D.   Stopped at 07/12/23 1320    heparin infusion 25,000 units in 500 mL 0.45% NACL  0-30 Units/kg/hr (Adjusted) Intravenous Continuous LANDON Mcnamara.O. 16.3 mL/hr at 07/13/23 0653 12 Units/kg/hr at 07/13/23 0653    heparin injection 2,000 Units  30 Units/kg (Adjusted) Intravenous PRN LANDON Mcnamara.O.        levothyroxine (Synthroid) tablet 50 mcg  50 mcg Oral BID Truman Magdaleno D.O.   50 mcg at 07/13/23 0503    simvastatin (Zocor) tablet 40 mg  40 mg Oral Q EVENING Truman Magdaleno D.O.   40 mg at 07/12/23 1727    senna-docusate (Pericolace Or Senokot S) 8.6-50 MG per tablet 2 Tablet  2 Tablet Oral BID Truman Magdaleno D.O.   2 Tablet at 07/13/23 0503    And    polyethylene glycol/lytes (Miralax) PACKET 1 Packet  1 Packet Oral QDAY PRN Truman Magdaleno D.O.        And    magnesium hydroxide (Milk Of Magnesia) suspension 30 mL  30 mL Oral QDAY PRN Truman Magdaleno D.O.        And    bisacodyl (Dulcolax) suppository 10 mg  10 mg Rectal QDAY PRN Truman Magdaleno D.O.        Respiratory Therapy Consult   Nebulization Continuous RT LANDON Mcnamara.OSha        acetaminophen (Tylenol) tablet 650 mg  650 mg Oral Q6HRS PRN LANDON Mcnamara.O.        aspirin (Asa) tablet 325 mg  325 mg Oral DAILY Truman Magdaleno D.O.        Or    aspirin (Asa) chewable tab 324 mg  324 mg Oral DAILY Truman Magdaleno D.O.   324 mg at 07/13/23 0503    Or    aspirin (Asa) suppository 300 mg  300 mg Rectal DAILY Truman Magdaleno D.O.        metoprolol  tartrate (Lopressor) tablet 25 mg  25 mg Oral TWICE DAILY LANDON Mcnamara.OSha   25 mg at 07/13/23 0503    ondansetron (Zofran) syringe/vial injection 4 mg  4 mg Intravenous Q4HRS PRN LANDON Mcnamara.OSha        ondansetron (Zofran ODT) dispertab 4 mg  4 mg Oral Q4HRS PRN LANDON Mcnamara.OSha   4 mg at 07/12/23 1629    insulin regular (HumuLIN R,NovoLIN R) injection  3-18 Units Subcutaneous 4X/DAY ACHS LANDON Mcnamara.O.   3 Units at 07/12/23 1959    nitroglycerin 50 mg in D5W 250 ml infusion  0-200 mcg/min Intravenous Continuous Cony Santana A.P.R.N. 9 mL/hr at 07/13/23 0505 30 mcg/min at 07/13/23 0505    MD Alert...ICU Electrolyte Replacement per Pharmacy   Other PHARMACY TO DOSE Davi Stevens M.D.        iohexol (OMNIPAQUE) 350 mg/mL  1-300 mL Intra-arterial Once William Cancino M.D.           Fluids    Intake/Output Summary (Last 24 hours) at 7/13/2023 0720  Last data filed at 7/13/2023 0600  Gross per 24 hour   Intake 756.4 ml   Output 400 ml   Net 356.4 ml       Laboratory          Recent Labs     07/12/23  0650 07/12/23  1214 07/13/23  0142   SODIUM 138  --  140   POTASSIUM 4.3  --  3.7   CHLORIDE 103  --  103   CO2 20  --  25   BUN 11  --  13   CREATININE 0.74  --  0.72   MAGNESIUM  --  1.6 2.4   PHOSPHORUS  --   --  4.0   CALCIUM 9.3  --  8.4*     Recent Labs     07/12/23  0650 07/13/23  0142   ALTSGPT 15  --    ASTSGOT 32  --    ALKPHOSPHAT 143*  --    TBILIRUBIN 0.6  --    GLUCOSE 199* 107*     Recent Labs     07/12/23  0650 07/13/23  0142   WBC 9.5 10.5   NEUTSPOLYS 84.40*  --    LYMPHOCYTES 8.00*  --    MONOCYTES 5.80  --    EOSINOPHILS 0.20  --    BASOPHILS 0.40  --    ASTSGOT 32  --    ALTSGPT 15  --    ALKPHOSPHAT 143*  --    TBILIRUBIN 0.6  --      Recent Labs     07/12/23  0650 07/12/23  1214 07/13/23  0142   RBC 4.53  --  3.94*   HEMOGLOBIN 13.5  --  11.7*   HEMATOCRIT 40.6  --  35.7*   PLATELETCT 247  --  226   PROTHROMBTM 18.5* 18.0*  --    APTT 27.0  116.3*  --    INR 1.57* 1.52*  --        Imaging  None    Assessment/Plan  * Atrial flutter with rapid ventricular response (HCC)- (present on admission)  Assessment & Plan  She is in sinus rhythm following SILVA cardioversion on 7/12  Optimize potassium and magnesium  Fully anticoagulated on a heparin drip    Hypertensive emergency  Assessment & Plan  Associated angina pectoris, acute NSTEMI and acute pulmonary edema  Goal SBP less than 150  I am titrating a nitroglycerin drip to achieve blood pressure control  Metoprolol tartrate, 25 mg twice daily    NSTEMI (non-ST elevated myocardial infarction) ([facility])  Assessment & Plan  Acute non-STEMI  Echocardiogram  I am titrating a heparin drip based upon serial anti-Xa determinations  Aspirin  Statin  Cardiac catheterization    Type 2 diabetes mellitus ([facility])  Assessment & Plan  Glycohemoglobin 6.8  Sliding scale insulin    Chronic anticoagulation- (present on admission)  Assessment & Plan  On chronic warfarin for history of recurrent PE/DVT  I am titrating a heparin drip based upon serial anti-Xa determinations    Primary hypertension- (present on admission)  Assessment & Plan  Goal SBP less than 150  I am titrating a nitroglycerin drip to achieve blood pressure control    S/P IVC filter  Assessment & Plan  IVC filter placement in 2015    Dyslipidemia- (present on admission)  Assessment & Plan  Continue statin    Breast cancer ([facility])- (present on admission)  Assessment & Plan  History of stage IA infiltrating ductal carcinoma  S/P partial mastectomy in 2015 with adjuvant radiation    History of recurrent pulmonary embolism- (present on admission)  Assessment & Plan  On chronic anticoagulation with warfarin  IVC filter in place  I am titrating a heparin drip based upon serial anti-Xa monitoring         VTE:  Heparin  Ulcer: Not Indicated  Lines: None    I have performed a physical exam and reviewed and updated ROS and Plan today (7/13/2023). In review of yesterday's note  (7/12/2023), there are no changes except as documented above.     I have assessed and reassessed her blood pressure, hemodynamics, cardiovascular status with titration of nitroglycerin as well as serial determinations of her anticoagulation status with titration of a heparin drip.  She is at increased risk for worsening cardiovascular and hematologic system dysfunction.    Discussed patient condition and risk of morbidity and/or mortality with RN, RT, Pharmacy, Charge nurse / hot rounds, and QA team    The patient remains critically ill.  Critical care time = 35 minutes in directly providing and coordinating critical care and extensive data review.  No time overlap and excludes procedures.    Davi Stevens MD  Pulmonary and Critical Care Medicine

## 2023-07-12 NOTE — CONSULTS
PULMONARY AND CRITICAL CARE MEDICINE CONSULTATION    Date of Consultation:  7/12/2023    Requesting Physician:  David Welch DO    Consulting Physician:  Davi Stevens MD    Reason for Consultation: Critical care management in lady with atrial flutter with rapid ventricular response, acute non-STEMI and hypertensive emergency    Chief Complaint: Chest pain    History of Present Illness:    I was kindly asked to see and evaluate Nancy Kign, a 87 y.o. female for evaluation and management of the above problem.    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  She has been experiencing increasing angina for the last several months.  For the last month she has had increasing lower extremity edema.  She presented to an outside hospital with these complaints and was found to be in atrial flutter with rapid ventricular response.  She was transferred to [facility] for subspecialty care.  With a diltiazem drip her heart rate is controlled and her chest pressure has resolved.      Medications Prior to Admission:    No current facility-administered medications on file prior to encounter.     Current Outpatient Medications on File Prior to Encounter   Medication Sig Dispense Refill    levothyroxine (SYNTHROID) 50 MCG Tab Take 50 mcg by mouth 2 times a day.      metoprolol tartrate (LOPRESSOR) 100 MG Tab Take 100 mg by mouth every morning.      acetaminophen (TYLENOL 8 HOUR ARTHRITIS PAIN) 650 MG CR tablet Take 1,300 mg by mouth every 6 hours as needed for Moderate Pain.      B Complex Vitamins (VITAMIN B COMPLEX) Tab Take 1 Tablet by mouth every morning.      simvastatin (ZOCOR) 40 MG Tab TAKE 1 TABLET BY MOUTH IN  THE EVENING 90 Tablet 0    nitroglycerin (NITROSTAT) 0.4 MG SL Tab  DISSOLVE 1 TABLET UNDER THE TONGUE EVERY 5 MINUTES AS  NEEDED FOR CHEST PAIN. MAX  OF 3 TABLETS IN 15 MINUTES. CALL 911 IF PAIN PERSISTS. 50 Tablet 7    JANUVIA 100 MG Tab TAKE 1 TABLET BY MOUTH  DAILY 90 Tablet 3    warfarin (COUMADIN) 1 MG Tab TAKE 2 AND 1/2 TABLETS BY  MOUTH DAILY AS DIRECTED BY  [facility] 225 Tablet 3       Current Medications:      Current Facility-Administered Medications:     dilTIAZem (Cardizem) 100 mg in dextrose 5% 100 mL Infusion, 0-20 mg/hr, Intravenous, Continuous, Elvia Hardwick M.D., Last Rate: 20 mL/hr at 07/12/23 1237, 20 mg/hr at 07/12/23 1237    heparin infusion 25,000 units in 500 mL 0.45% NACL, 0-30 Units/kg/hr (Adjusted), Intravenous, Continuous, Truman Magdaleno D.O., Last Rate: 16.3 mL/hr at 07/12/23 1055, 12 Units/kg/hr at 07/12/23 1055    heparin injection 2,000 Units, 30 Units/kg (Adjusted), Intravenous, PRN, LANDON Mcnamara.O.    levothyroxine (Synthroid) tablet 50 mcg, 50 mcg, Oral, BID, Truman Magdaleno D.O.    simvastatin (Zocor) tablet 40 mg, 40 mg, Oral, Q EVENING, ALEX McnamaraO.    senna-docusate (Pericolace Or Senokot S) 8.6-50 MG per tablet 2 Tablet, 2 Tablet, Oral, BID **AND** polyethylene glycol/lytes (Miralax) PACKET 1 Packet, 1 Packet, Oral, QDAY PRN **AND** magnesium hydroxide (Milk Of Magnesia) suspension 30 mL, 30 mL, Oral, QDAY PRN **AND** bisacodyl (Dulcolax) suppository 10 mg, 10 mg, Rectal, QDAY PRN, LANDON Mcnamara.O.    Respiratory Therapy Consult, , Nebulization, Continuous RT, LANDON Mcnamara.O.    acetaminophen (Tylenol) tablet 650 mg, 650 mg, Oral, Q6HRS PRN, ALEX McnamaraO.    [START ON 7/13/2023] aspirin (Asa) tablet 325 mg, 325 mg, Oral, DAILY **OR** [START ON 7/13/2023] aspirin (Asa) chewable tab 324 mg, 324 mg, Oral, DAILY **OR** [START ON 7/13/2023] aspirin (Asa) suppository 300 mg, 300 mg, Rectal, DAILY, Truman Magdaleno D.O.    metoprolol  "tartrate (Lopressor) tablet 25 mg, 25 mg, Oral, TWICE DAILY, ALEX McnamaraOSha    ondansetron (Zofran) syringe/vial injection 4 mg, 4 mg, Intravenous, Q4HRS PRN, ALEX McnamaraO.    ondansetron (Zofran ODT) dispertab 4 mg, 4 mg, Oral, Q4HRS PRN, ALEX McnamaraO.    insulin regular (HumuLIN R,NovoLIN R) injection, 3-18 Units, Subcutaneous, 4X/DAY ACHS, ALEX McnamaraOSha    nitroglycerin 50 mg in D5W 250 ml infusion, 0-200 mcg/min, Intravenous, Continuous, CATRACHITA ChaneyRShaNSha, Last Rate: 6 mL/hr at 07/12/23 1237, 20 mcg/min at 07/12/23 1237    MD Alert...ICU Electrolyte Replacement per Pharmacy, , Other, PHARMACY TO DOSE, Davi Stevens M.D.    iohexol (OMNIPAQUE) 350 mg/mL, 1-300 mL, Intra-arterial, Once, William Cancino M.D.    magnesium sulfate IVPB premix 4 g, 4 g, Intravenous, Once, Davi Stevens M.D.    FENTANYL CITRATE (PF) 0.05 MG/ML INJ SOLN (WRAPPED), , , ,     MIDAZOLAM HCL 2 MG/2ML INJ SOLN (WRAPPER), , , ,     ASPIRIN 81 MG PO CHEW, , , ,     Allergies:    Sulfa drugs and Levofloxacin    Past Surgical History:    Past Surgical History:   Procedure Laterality Date    MASTECTOMY  1/12/2015    Performed by Dwight Birch M.D. at SURGERY [facility] ORS    NODE BIOPSY SENTINEL  1/12/2015    Performed by Dwight Birch M.D. at SURGERY [facility] ORS    AXILLARY NODE DISSECTION  1/12/2015    Performed by Dwight Birch M.D. at SURGERY [facility] ORS    APPENDECTOMY      CHOLECYSTECTOMY      HYSTERECTOMY, TOTAL ABDOMINAL      OTHER      Bladder    TONSILLECTOMY AND ADENOIDECTOMY         Past Medical History:    Past Medical History:   Diagnosis Date    Acute venous embolism and thrombosis of unspecified deep vessels of lower extremity     Angina pectoris 5/11/2012    \"with stress\"    Benign essential HTN 5/11/2012    CAD (coronary artery disease) 5/11/2012    Cancer (HCC)     right breast    Chronic anticoagulation 5/11/2012    Heart burn " "    Hyperlipidemia 5/11/2012    Hyperlipoproteinemia 5/11/2012    Indigestion     Obesity 5/11/2012    Other specified disorder of intestines     diarrhea    Pulmonary embolism (HCC)     Unspecified cataract     removed bilat    Unspecified disorder of thyroid     Unspecified urinary incontinence        Social History:    Social History     Socioeconomic History    Marital status:      Spouse name: Not on file    Number of children: Not on file    Years of education: Not on file    Highest education level: Not on file   Occupational History    Not on file   Tobacco Use    Smoking status: Never    Smokeless tobacco: Never   Vaping Use    Vaping Use: Never used   Substance and Sexual Activity    Alcohol use: Yes     Comment: \"Couple drinks every night\"    Drug use: No    Sexual activity: Not on file   Other Topics Concern    Not on file   Social History Narrative    Not on file     Social Determinants of Health     Financial Resource Strain: Not on file   Food Insecurity: Not on file   Transportation Needs: Not on file   Physical Activity: Not on file   Stress: Not on file   Social Connections: Not on file   Intimate Partner Violence: Not on file   Housing Stability: Not on file       Family History:    Family History   Problem Relation Age of Onset    Heart Disease Mother     Heart Disease Father        Review of System:    Review of Systems   Unable to perform ROS: Acuity of condition       Physical Examination:    BP (!) 203/97   Pulse (!) 121   Temp 36.7 °C (98 °F) (Temporal)   Resp (!) 21   Ht 1.651 m (5' 5\")   Wt 83.9 kg (185 lb)   SpO2 93%   BMI 30.79 kg/m²   Physical Exam  Constitutional:       Appearance: She is not diaphoretic.   HENT:      Head: Normocephalic.      Mouth/Throat:      Pharynx: Oropharynx is clear.   Eyes:      Pupils: Pupils are equal, round, and reactive to light.   Cardiovascular:      Comments: Atrial flutter with rapid ventricular response  Pulmonary:      Breath sounds: " Rales (Few crackles at the bases) present. No wheezing.   Abdominal:      General: There is no distension.      Tenderness: There is no abdominal tenderness.   Musculoskeletal:      Right lower leg: Edema present.      Left lower leg: Edema present.   Skin:     General: Skin is warm.      Capillary Refill: Capillary refill takes less than 2 seconds.   Neurological:      General: No focal deficit present.      Mental Status: She is oriented to person, place, and time.      Cranial Nerves: No cranial nerve deficit.         Laboratory Data:        Recent Labs     07/12/23  0650   WBC 9.5   RBC 4.53   HEMOGLOBIN 13.5   HEMATOCRIT 40.6   MCV 89.6   MCH 29.8   MCHC 33.3   RDW 48.7   PLATELETCT 247   MPV 10.1     Recent Labs     07/12/23  0650   SODIUM 138   POTASSIUM 4.3   CHLORIDE 103   CO2 20   GLUCOSE 199*   BUN 11   CREATININE 0.74   CALCIUM 9.3                   Imaging:    I personally viewed all the available CXR and CT scan images as well as reviewed the radiology interpretation reports.    CT-CTA CHEST PULMONARY ARTERY W/ RECONS   Final Result      1.  No evidence of pulmonary embolism.   2.  Mild interstitial pulmonary edema.   3.  Atherosclerosis including coronary artery disease.            EC-ECHOCARDIOGRAM COMPLETE W/O CONT    (Results Pending)   CL-CARDIOVERSION    (Results Pending)   EC-SILVA W/ CONT    (Results Pending)   CL-LEFT HEART CATHETERIZATION WITH POSSIBLE INTERVENTION    (Results Pending)       Assessment and Plan:    * Atrial flutter with rapid ventricular response (HCC)- (present on admission)  Assessment & Plan  I am titrating a diltiazem drip to achieve rate control  Optimize potassium and magnesium  Plan for SILVA electrical cardioversion    Hypertensive emergency  Assessment & Plan  Associated angina pectoris, acute NSTEMI and acute pulmonary edema  Goal SBP less than 150  I am titrating a nitroglycerin drip to achieve blood pressure control  Metoprolol tartrate, 25 mg twice daily    NSTEMI  (non-ST elevated myocardial infarction) (HCC)  Assessment & Plan  Acute non-STEMI  Echocardiogram  I am titrating a heparin drip based upon serial anti-Xa determinations  Aspirin  Statin  Cardiac catheterization    Type 2 diabetes mellitus (HCC)  Assessment & Plan  Check glycohemoglobin  Sliding scale insulin    Chronic anticoagulation- (present on admission)  Assessment & Plan  On chronic warfarin for history of recurrent PE/DVT  I am titrating a heparin drip based upon serial anti-Xa determinations    Primary hypertension- (present on admission)  Assessment & Plan  Currently in a hypertensive emergency    S/P IVC filter  Assessment & Plan  IVC filter placement in 2015    Dyslipidemia- (present on admission)  Assessment & Plan  Continue statin    Breast cancer (HCC)- (present on admission)  Assessment & Plan  History of stage Ia infiltrating ductal carcinoma  S/P partial mastectomy in 2015 with adjuvant radiation    History of recurrent pulmonary embolism- (present on admission)  Assessment & Plan  On chronic anticoagulation with warfarin  IVC filter in place      I have assessed and reassessed her respiratory status, blood pressure, hemodynamics, cardiovascular status with titration of diltiazem and nitroglycerin, serial determinations of her anticoagulation status with titration of a heparin drip.  She is at increased risk for worsening respiratory, cardiovascular and hematologic system dysfunction.    High risk of deterioration and worsening vital organ dysfunction and death without the above critical care interventions.    Thank you for allowing me to participate in the care of this lady.  I will continue to follow her with great interest.    The patient is critically ill.  Critical care time = 105 minutes in directly providing and coordinating critical care and extensive data review.  No time overlap and excludes procedures.    Discussed with hospitalist, ZAK Stevens MD  Pulmonary and  Critical Care Medicine

## 2023-07-12 NOTE — PROGRESS NOTES
Assumed care of patient, labs drawn and sent, Pt hypertensive in the 200's systolic, paged CICI Santana for orders.    Orders received to give IV lasix, start Nitro gtt, and hold PO metoprolol.

## 2023-07-12 NOTE — ASSESSMENT & PLAN NOTE
New diagnosis for the patient  Admit to telemetry  Plan electrical cardioversion  Had been on warfarin however we will transition to heparin drip acutely and plan DOAC on discharge  Check cardiac echo, TSH  Monitor and replace electrolytes  Cardiology involved

## 2023-07-12 NOTE — ASSESSMENT & PLAN NOTE
Please several years ago for history of DVT and PE  He is on full dose anticoagulation will go home on the same    DW vasc srgry: they feel given how long the filter has been in place risk of removal exceeds potential benefit

## 2023-07-12 NOTE — ED NOTES
Bedside report given to the next shift RN NASIR for continuity of care and management.  Provided opportunity to asks questions.  Pt connected to monitor  All pt belongings at bedside

## 2023-07-12 NOTE — ASSESSMENT & PLAN NOTE
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

## 2023-07-12 NOTE — ASSESSMENT & PLAN NOTE
Maintained on metoprolol 100 mg as an outpatient    At present is on  -imdur 30  -lisinopril 5mg: increase to 10mg  -metoprolol SR 25mg  -ranolazine 500mg    Cont to monitor and titrate

## 2023-07-12 NOTE — ASSESSMENT & PLAN NOTE
Patient is maintained on sitagliptin  A1c on this admission was 6.8 which would imply good control  Given recent cardiac Dx is a good candidate for an SGLT2

## 2023-07-12 NOTE — PROGRESS NOTES
4 Eyes Skin Assessment Completed by ZAK Archibald and Filiberto RN.    Head WDL  Ears WDL  Nose WDL  Mouth WDL  Neck WDL  Breast/Chest Redness and Bruising, blanchable  Shoulder Blades WDL  Spine WDL  (R) Arm/Elbow/Hand Redness/tear  (L) Arm/Elbow/Hand WDL  Abdomen WDL  Groin Redness, Blanching, and Excoriation  Scrotum/Coccyx/Buttocks Redness and Blanching  (R) Leg WDL  (L) Leg WDL  (R) Heel/Foot/Toe Edema  (L) Heel/Foot/Toe Edema          Devices In Places ECG, Blood Pressure Cuff, and Pulse Ox      Interventions In Place Pillows, Q2 Turns, Low Air Loss Mattress, and Heels Loaded W/Pillows    Possible Skin Injury Yes    Pictures Uploaded Into Epic Yes  Wound Consult Placed N/A  RN Wound Prevention Protocol Ordered No

## 2023-07-12 NOTE — ASSESSMENT & PLAN NOTE
She is in sinus rhythm following SILVA cardioversion on 7/12  Optimize potassium and magnesium  I am titrating heparin drip based upon serial anti-Xa monitoring  Metoprolol succinate, 25 mg daily

## 2023-07-12 NOTE — CONSULTS
Brief intensivist note.     88yo female with hx of CAD, DM, HTN, DVT/PE s/p IVC and on coumadin who presented to OSH for chest pain. Reportedly was in SVT s/p adenosine x3 without improvement and later pt appears to be in afib with RVR. Pt was transferred to [facility] for further management.   Pt was given metoprolol and diltiazem gtt.   SBP was found in 200s, on diltiazem gtt.   Started on heparin gtt  Troponins are elevated at 600, 250s.   ECG with concerns of NSTEMI   Cardiology was consulted and plan for [facility]  She reported her chest pain has resolved at the moment.   Alert, oriented. No hypotension.   HR currently in 110s but SBP in 180s-200s  On room air, sat >92%    Pt can be admitted to IMCU   RTOC and ERP were notified  Hospital Medicine to admit patient  Will sign off, please call with questions    Scooby Stout D.O.  Critical Care

## 2023-07-12 NOTE — ASSESSMENT & PLAN NOTE
History of stage IA infiltrating ductal carcinoma  S/P partial mastectomy in 2015 with adjuvant radiation

## 2023-07-13 ENCOUNTER — APPOINTMENT (OUTPATIENT)
Dept: CARDIOLOGY | Facility: MEDICAL CENTER | Age: 88
DRG: 281 | End: 2023-07-13
Attending: NURSE PRACTITIONER
Payer: MEDICARE

## 2023-07-13 LAB
ACT BLD: 173 SEC (ref 74–137)
ANION GAP SERPL CALC-SCNC: 12 MMOL/L (ref 7–16)
BUN SERPL-MCNC: 13 MG/DL (ref 8–22)
CALCIUM SERPL-MCNC: 8.4 MG/DL (ref 8.5–10.5)
CHLORIDE SERPL-SCNC: 103 MMOL/L (ref 96–112)
CHOLEST SERPL-MCNC: 126 MG/DL (ref 100–199)
CO2 SERPL-SCNC: 25 MMOL/L (ref 20–33)
CREAT SERPL-MCNC: 0.72 MG/DL (ref 0.5–1.4)
EKG IMPRESSION: NORMAL
ERYTHROCYTE [DISTWIDTH] IN BLOOD BY AUTOMATED COUNT: 49.1 FL (ref 35.9–50)
GFR SERPLBLD CREATININE-BSD FMLA CKD-EPI: 81 ML/MIN/1.73 M 2
GLUCOSE BLD STRIP.AUTO-MCNC: 117 MG/DL (ref 65–99)
GLUCOSE BLD STRIP.AUTO-MCNC: 128 MG/DL (ref 65–99)
GLUCOSE BLD STRIP.AUTO-MCNC: 128 MG/DL (ref 65–99)
GLUCOSE BLD STRIP.AUTO-MCNC: 151 MG/DL (ref 65–99)
GLUCOSE SERPL-MCNC: 107 MG/DL (ref 65–99)
HCT VFR BLD AUTO: 35.7 % (ref 37–47)
HDLC SERPL-MCNC: 40 MG/DL
HGB BLD-MCNC: 11.7 G/DL (ref 12–16)
LDLC SERPL CALC-MCNC: 66 MG/DL
LV EJECT FRACT  99904: 55
MAGNESIUM SERPL-MCNC: 2.4 MG/DL (ref 1.5–2.5)
MCH RBC QN AUTO: 29.7 PG (ref 27–33)
MCHC RBC AUTO-ENTMCNC: 32.8 G/DL (ref 32.2–35.5)
MCV RBC AUTO: 90.6 FL (ref 81.4–97.8)
PHOSPHATE SERPL-MCNC: 4 MG/DL (ref 2.5–4.5)
PLATELET # BLD AUTO: 226 K/UL (ref 164–446)
PMV BLD AUTO: 10.3 FL (ref 9–12.9)
POTASSIUM SERPL-SCNC: 3.7 MMOL/L (ref 3.6–5.5)
RBC # BLD AUTO: 3.94 M/UL (ref 4.2–5.4)
SODIUM SERPL-SCNC: 140 MMOL/L (ref 135–145)
TRIGL SERPL-MCNC: 100 MG/DL (ref 0–149)
UFH PPP CHRO-ACNC: 0.44 IU/ML
WBC # BLD AUTO: 10.5 K/UL (ref 4.8–10.8)

## 2023-07-13 PROCEDURE — 700101 HCHG RX REV CODE 250

## 2023-07-13 PROCEDURE — A9270 NON-COVERED ITEM OR SERVICE: HCPCS | Performed by: INTERNAL MEDICINE

## 2023-07-13 PROCEDURE — 700111 HCHG RX REV CODE 636 W/ 250 OVERRIDE (IP): Mod: JZ | Performed by: NURSE PRACTITIONER

## 2023-07-13 PROCEDURE — 700117 HCHG RX CONTRAST REV CODE 255: Performed by: INTERNAL MEDICINE

## 2023-07-13 PROCEDURE — 770022 HCHG ROOM/CARE - ICU (200)

## 2023-07-13 PROCEDURE — 99233 SBSQ HOSP IP/OBS HIGH 50: CPT | Mod: 25 | Performed by: INTERNAL MEDICINE

## 2023-07-13 PROCEDURE — 85520 HEPARIN ASSAY: CPT

## 2023-07-13 PROCEDURE — 80048 BASIC METABOLIC PNL TOTAL CA: CPT

## 2023-07-13 PROCEDURE — 4A023N7 MEASUREMENT OF CARDIAC SAMPLING AND PRESSURE, LEFT HEART, PERCUTANEOUS APPROACH: ICD-10-PCS | Performed by: INTERNAL MEDICINE

## 2023-07-13 PROCEDURE — 93458 L HRT ARTERY/VENTRICLE ANGIO: CPT | Mod: 26 | Performed by: INTERNAL MEDICINE

## 2023-07-13 PROCEDURE — A9270 NON-COVERED ITEM OR SERVICE: HCPCS | Performed by: HOSPITALIST

## 2023-07-13 PROCEDURE — 99291 CRITICAL CARE FIRST HOUR: CPT | Performed by: INTERNAL MEDICINE

## 2023-07-13 PROCEDURE — 83735 ASSAY OF MAGNESIUM: CPT

## 2023-07-13 PROCEDURE — 99153 MOD SED SAME PHYS/QHP EA: CPT

## 2023-07-13 PROCEDURE — A9270 NON-COVERED ITEM OR SERVICE: HCPCS | Mod: JZ | Performed by: INTERNAL MEDICINE

## 2023-07-13 PROCEDURE — 93306 TTE W/DOPPLER COMPLETE: CPT | Mod: 26 | Performed by: INTERNAL MEDICINE

## 2023-07-13 PROCEDURE — 84100 ASSAY OF PHOSPHORUS: CPT

## 2023-07-13 PROCEDURE — 700111 HCHG RX REV CODE 636 W/ 250 OVERRIDE (IP)

## 2023-07-13 PROCEDURE — 700111 HCHG RX REV CODE 636 W/ 250 OVERRIDE (IP): Performed by: HOSPITALIST

## 2023-07-13 PROCEDURE — 85347 COAGULATION TIME ACTIVATED: CPT

## 2023-07-13 PROCEDURE — 93010 ELECTROCARDIOGRAM REPORT: CPT | Performed by: INTERNAL MEDICINE

## 2023-07-13 PROCEDURE — 93005 ELECTROCARDIOGRAM TRACING: CPT | Performed by: INTERNAL MEDICINE

## 2023-07-13 PROCEDURE — 82962 GLUCOSE BLOOD TEST: CPT | Mod: 91

## 2023-07-13 PROCEDURE — 700102 HCHG RX REV CODE 250 W/ 637 OVERRIDE(OP): Performed by: HOSPITALIST

## 2023-07-13 PROCEDURE — 700102 HCHG RX REV CODE 250 W/ 637 OVERRIDE(OP): Performed by: INTERNAL MEDICINE

## 2023-07-13 PROCEDURE — B2111ZZ FLUOROSCOPY OF MULTIPLE CORONARY ARTERIES USING LOW OSMOLAR CONTRAST: ICD-10-PCS | Performed by: INTERNAL MEDICINE

## 2023-07-13 PROCEDURE — 700102 HCHG RX REV CODE 250 W/ 637 OVERRIDE(OP): Mod: JZ | Performed by: INTERNAL MEDICINE

## 2023-07-13 PROCEDURE — 85027 COMPLETE CBC AUTOMATED: CPT

## 2023-07-13 PROCEDURE — 99152 MOD SED SAME PHYS/QHP 5/>YRS: CPT | Performed by: INTERNAL MEDICINE

## 2023-07-13 PROCEDURE — 80061 LIPID PANEL: CPT

## 2023-07-13 RX ORDER — POTASSIUM CHLORIDE 20 MEQ/1
40 TABLET, EXTENDED RELEASE ORAL ONCE
Status: COMPLETED | OUTPATIENT
Start: 2023-07-13 | End: 2023-07-13

## 2023-07-13 RX ORDER — ASPIRIN 81 MG/1
81 TABLET ORAL DAILY
Status: DISCONTINUED | OUTPATIENT
Start: 2023-07-14 | End: 2023-07-17 | Stop reason: HOSPADM

## 2023-07-13 RX ORDER — LISINOPRIL 5 MG/1
5 TABLET ORAL
Status: DISCONTINUED | OUTPATIENT
Start: 2023-07-13 | End: 2023-07-15

## 2023-07-13 RX ORDER — LIDOCAINE HYDROCHLORIDE 20 MG/ML
INJECTION, SOLUTION INFILTRATION; PERINEURAL
Status: COMPLETED
Start: 2023-07-13 | End: 2023-07-13

## 2023-07-13 RX ORDER — ROSUVASTATIN CALCIUM 20 MG/1
20 TABLET, COATED ORAL EVERY EVENING
Status: DISCONTINUED | OUTPATIENT
Start: 2023-07-13 | End: 2023-07-17 | Stop reason: HOSPADM

## 2023-07-13 RX ORDER — HEPARIN SODIUM 200 [USP'U]/100ML
INJECTION, SOLUTION INTRAVENOUS
Status: COMPLETED
Start: 2023-07-13 | End: 2023-07-14

## 2023-07-13 RX ORDER — VERAPAMIL HYDROCHLORIDE 2.5 MG/ML
INJECTION, SOLUTION INTRAVENOUS
Status: COMPLETED
Start: 2023-07-13 | End: 2023-07-13

## 2023-07-13 RX ORDER — MIDAZOLAM HYDROCHLORIDE 1 MG/ML
INJECTION INTRAMUSCULAR; INTRAVENOUS
Status: COMPLETED
Start: 2023-07-13 | End: 2023-07-13

## 2023-07-13 RX ORDER — HEPARIN SODIUM 1000 [USP'U]/ML
INJECTION, SOLUTION INTRAVENOUS; SUBCUTANEOUS
Status: COMPLETED
Start: 2023-07-13 | End: 2023-07-13

## 2023-07-13 RX ADMIN — INSULIN HUMAN 3 UNITS: 100 INJECTION, SOLUTION PARENTERAL at 17:03

## 2023-07-13 RX ADMIN — LEVOTHYROXINE SODIUM 50 MCG: 0.05 TABLET ORAL at 17:03

## 2023-07-13 RX ADMIN — NITROGLYCERIN 30 MCG/MIN: 20 INJECTION INTRAVENOUS at 08:32

## 2023-07-13 RX ADMIN — METOPROLOL TARTRATE 25 MG: 25 TABLET, FILM COATED ORAL at 17:03

## 2023-07-13 RX ADMIN — POTASSIUM CHLORIDE 40 MEQ: 1500 TABLET, EXTENDED RELEASE ORAL at 08:24

## 2023-07-13 RX ADMIN — LIDOCAINE HYDROCHLORIDE: 20 INJECTION, SOLUTION INFILTRATION; PERINEURAL at 15:36

## 2023-07-13 RX ADMIN — METOPROLOL TARTRATE 25 MG: 25 TABLET, FILM COATED ORAL at 05:03

## 2023-07-13 RX ADMIN — SENNOSIDES AND DOCUSATE SODIUM 2 TABLET: 50; 8.6 TABLET ORAL at 05:03

## 2023-07-13 RX ADMIN — LISINOPRIL 5 MG: 5 TABLET ORAL at 12:45

## 2023-07-13 RX ADMIN — NITROGLYCERIN 10 ML: 20 INJECTION INTRAVENOUS at 15:37

## 2023-07-13 RX ADMIN — HEPARIN SODIUM: 1000 INJECTION, SOLUTION INTRAVENOUS; SUBCUTANEOUS at 15:37

## 2023-07-13 RX ADMIN — NITROGLYCERIN 40 MCG/MIN: 20 INJECTION INTRAVENOUS at 19:52

## 2023-07-13 RX ADMIN — VERAPAMIL HYDROCHLORIDE 5 MG: 2.5 INJECTION, SOLUTION INTRAVENOUS at 15:36

## 2023-07-13 RX ADMIN — HEPARIN SODIUM 2000 UNITS: 200 INJECTION, SOLUTION INTRAVENOUS at 15:37

## 2023-07-13 RX ADMIN — ASPIRIN 81 MG 324 MG: 81 TABLET ORAL at 05:03

## 2023-07-13 RX ADMIN — IOHEXOL 20 ML: 350 INJECTION, SOLUTION INTRAVENOUS at 16:13

## 2023-07-13 RX ADMIN — FENTANYL CITRATE 25 MCG: 50 INJECTION, SOLUTION INTRAMUSCULAR; INTRAVENOUS at 16:24

## 2023-07-13 RX ADMIN — ROSUVASTATIN CALCIUM 20 MG: 10 TABLET, FILM COATED ORAL at 17:03

## 2023-07-13 RX ADMIN — LEVOTHYROXINE SODIUM 50 MCG: 0.05 TABLET ORAL at 05:03

## 2023-07-13 RX ADMIN — HEPARIN SODIUM 12 UNITS/KG/HR: 5000 INJECTION, SOLUTION INTRAVENOUS at 16:47

## 2023-07-13 RX ADMIN — MIDAZOLAM 0.5 MG: 1 INJECTION, SOLUTION INTRAMUSCULAR; INTRAVENOUS at 16:24

## 2023-07-13 ASSESSMENT — PATIENT HEALTH QUESTIONNAIRE - PHQ9
SUM OF ALL RESPONSES TO PHQ9 QUESTIONS 1 AND 2: 0
2. FEELING DOWN, DEPRESSED, IRRITABLE, OR HOPELESS: NOT AT ALL
1. LITTLE INTEREST OR PLEASURE IN DOING THINGS: NOT AT ALL

## 2023-07-13 ASSESSMENT — ENCOUNTER SYMPTOMS
WEAKNESS: 0
SHORTNESS OF BREATH: 0
ORTHOPNEA: 0
DIZZINESS: 0
PALPITATIONS: 0
COUGH: 0

## 2023-07-13 ASSESSMENT — PAIN DESCRIPTION - PAIN TYPE
TYPE: ACUTE PAIN

## 2023-07-13 ASSESSMENT — LIFESTYLE VARIABLES
AVERAGE NUMBER OF DAYS PER WEEK YOU HAVE A DRINK CONTAINING ALCOHOL: 4
ON A TYPICAL DAY WHEN YOU DRINK ALCOHOL HOW MANY DRINKS DO YOU HAVE: 1
ALCOHOL_USE: YES
EVER HAD A DRINK FIRST THING IN THE MORNING TO STEADY YOUR NERVES TO GET RID OF A HANGOVER: NO
TOTAL SCORE: 0
TOTAL SCORE: 0
HOW MANY TIMES IN THE PAST YEAR HAVE YOU HAD 5 OR MORE DRINKS IN A DAY: 0
DOES PATIENT WANT TO STOP DRINKING: NO
TOTAL SCORE: 0
CONSUMPTION TOTAL: NEGATIVE
HAVE PEOPLE ANNOYED YOU BY CRITICIZING YOUR DRINKING: NO
HAVE YOU EVER FELT YOU SHOULD CUT DOWN ON YOUR DRINKING: NO
EVER FELT BAD OR GUILTY ABOUT YOUR DRINKING: NO

## 2023-07-13 ASSESSMENT — FIBROSIS 4 INDEX: FIB4 SCORE: 3.11

## 2023-07-13 NOTE — PROGRESS NOTES
"[facility] ICU Resident Progress Note      Admit Date: 7/12/2023    Resident(s): Rand Russell M.D.   Attending:  ZACH LANDRY/ Dr. Stevens    Patient ID:    Name:  Nancy King   YOB: 1935  Age:  87 y.o.  female   MRN:  [id number]    Hospital Course (carried forward and updated):  Nancy King is an 87 y.o. female with \"a history of DM type II, CAD, recurrent PE with IVC filter placement in 2015 on chronic warfarin therapy, infiltrating ductal carcinoma of the right breast with prior partial mastectomy and adjuvant radiation therapy, dyslipidemia, mild aortic stenosis and hypothyroidism.  She has been experiencing increasing angina for the last several months.  For the last month she has had increasing lower extremity edema.  She presented to an outside hospital with these complaints and was found to be in atrial flutter with rapid ventricular response.  She was transferred to [facility] for subspecialty care.  With a diltiazem drip her heart rate [was] controlled and her chest pressure [had] resolved.\" - Dr. Stevens     She underwent SILVA (no thrombus) with DC cardioversion for the atrial flutter and was restored to sinus rhythm.     Consultants:  Critical Care  Cardiology / Interventional Cardiology     Interval Events:  No acute events overnight. Telemetry with sinus daniel/sinus rhythm. Nitroglycerin drip able to be discontinued temporarily, however resumed due to SBP >150. NPO pending cardiac catheterization today for NSTEMI.     NEURO:   Fully alert and oriented, awake  CARDIOVASC:  BP 120s-140s/50s-60s with pulse 50s-60s  RESPIRATORY:  O2 high 90s on 1L NC  GI/NUTRITION:  NPO pending procedure  RENAL/FLUID/LYTES: Creatinine 0.72, no IVF, K 3.7, mag 2.4  HEME/ONC:   Hgb 11.7  INFECTIOUS D:  N/a  ENDOCRINE:   N/a    Vitals Range last 24h:  Temp:  [35.8 °C (96.4 °F)-36.9 °C (98.4 °F)] 36.9 °C (98.4 °F)  Pulse:  [] 57  Resp:  [11-57] 18  BP: ()/() 125/61  SpO2:  [89 %-99 " %] 96 %      Intake/Output Summary (Last 24 hours) at 7/13/2023 0720  Last data filed at 7/13/2023 0600  Gross per 24 hour   Intake 756.4 ml   Output 400 ml   Net 356.4 ml        Review of Systems   Respiratory:  Negative for cough and shortness of breath.    Cardiovascular:  Negative for chest pain, palpitations, orthopnea and leg swelling.   Neurological:  Negative for dizziness and weakness.       PHYSICAL EXAM:  Vitals:    07/13/23 0615 07/13/23 0630 07/13/23 0645 07/13/23 0700   BP: 114/56 133/63 128/60 125/61   Pulse: (!) 56 (!) 57 (!) 56 (!) 57   Resp: 16 16 17 18   Temp:       TempSrc:       SpO2: 94% 95% 96% 96%   Weight:       Height:        Body mass index is 27.81 kg/m².    O2 therapy: Pulse Oximetry: 96 %, O2 (LPM): 1, O2 Delivery Device: Silicone Nasal Cannula         Physical Exam  Vitals and nursing note reviewed.   Constitutional:       General: She is not in acute distress.     Appearance: Normal appearance. She is not ill-appearing, toxic-appearing or diaphoretic.   HENT:      Head: Normocephalic and atraumatic.      Mouth/Throat:      Mouth: Mucous membranes are moist.      Pharynx: Oropharynx is clear.   Eyes:      Extraocular Movements: Extraocular movements intact.   Cardiovascular:      Rate and Rhythm: Normal rate and regular rhythm.      Heart sounds: Murmur (systolic) heard.   Pulmonary:      Effort: Pulmonary effort is normal. No respiratory distress.      Breath sounds: Normal breath sounds. No wheezing or rales.   Abdominal:      General: Abdomen is flat. Bowel sounds are normal.      Palpations: Abdomen is soft.      Tenderness: There is no abdominal tenderness.   Musculoskeletal:      Right lower leg: No edema.      Left lower leg: No edema.   Skin:     General: Skin is warm and dry.   Neurological:      General: No focal deficit present.      Mental Status: She is alert and oriented to person, place, and time.   Psychiatric:         Mood and Affect: Mood normal.         Behavior:  Behavior normal.             Recent Labs     07/12/23  0650 07/12/23 1214 07/13/23  0142   SODIUM 138  --  140   POTASSIUM 4.3  --  3.7   CHLORIDE 103  --  103   CO2 20  --  25   BUN 11  --  13   CREATININE 0.74  --  0.72   MAGNESIUM  --  1.6 2.4   PHOSPHORUS  --   --  4.0   CALCIUM 9.3  --  8.4*     Recent Labs     07/12/23  0650 07/13/23  0142   ALTSGPT 15  --    ASTSGOT 32  --    ALKPHOSPHAT 143*  --    TBILIRUBIN 0.6  --    GLUCOSE 199* 107*     Recent Labs     07/12/23  0650 07/12/23 1214 07/13/23 0142   RBC 4.53  --  3.94*   HEMOGLOBIN 13.5  --  11.7*   HEMATOCRIT 40.6  --  35.7*   PLATELETCT 247  --  226   PROTHROMBTM 18.5* 18.0*  --    APTT 27.0 116.3*  --    INR 1.57* 1.52*  --      Recent Labs     07/12/23  0650 07/13/23 0142   WBC 9.5 10.5   NEUTSPOLYS 84.40*  --    LYMPHOCYTES 8.00*  --    MONOCYTES 5.80  --    EOSINOPHILS 0.20  --    BASOPHILS 0.40  --    ASTSGOT 32  --    ALTSGPT 15  --    ALKPHOSPHAT 143*  --    TBILIRUBIN 0.6  --        Meds:   dilTIAZem (Cardizem) 100 mg in dextrose 5% 100 mL Infusion  0-20 mg/hr Stopped (07/12/23 1320)    heparin  0-30 Units/kg/hr (Adjusted) 12 Units/kg/hr (07/13/23 0653)    heparin  30 Units/kg (Adjusted)      levothyroxine  50 mcg      simvastatin  40 mg      senna-docusate  2 Tablet      And    polyethylene glycol/lytes  1 Packet      And    magnesium hydroxide  30 mL      And    bisacodyl  10 mg      Respiratory Therapy Consult        acetaminophen  650 mg      aspirin  325 mg      Or    aspirin  324 mg      Or    aspirin  300 mg      metoprolol tartrate  25 mg      ondansetron  4 mg      ondansetron  4 mg      insulin regular  3-18 Units      nitroglycerin in D5W  0-200 mcg/min 30 mcg/min (07/13/23 0753)    MD Alert...Adult ICU Electrolyte Replacement per Pharmacy        iohexol  1-300 mL          Procedures:  7/13/23-  SILVA with DC cardioversion     Imaging:  EC-ECHOCARDIOGRAM COMPLETE W/O CONT         EC-SILVA W/O CONT         CT-CTA CHEST PULMONARY ARTERY  W/ RECONS   Final Result      1.  No evidence of pulmonary embolism.   2.  Mild interstitial pulmonary edema.   3.  Atherosclerosis including coronary artery disease.            CL-CARDIOVERSION    (Results Pending)   CL-LEFT HEART CATHETERIZATION WITH POSSIBLE INTERVENTION    (Results Pending)       ASSESSEMENT and PLAN:  *NSTEMI (non-ST elevated myocardial infarction) (HCC)  Assessment & Plan  Prior history of moderate apical ischemia as seen on NM stress test, LHC not done previously as recommended. EKG this admission with ST depression in V3 and V4. Troponin on presentation in the 200s -> 600s. Symptomatic with exertion    -Cardiology consulted  -Pending LHC  -Heparin drip  -ASA  -High-intensity statin  -Probable DAPT if she has PCI stent  -Echocardiogram read pending    Atrial flutter with rapid ventricular response (HCC)- (present on admission)  Assessment & Plan  New diagnosis for the patient, symptomatic. S/p DC cardioversion 7/12 with restoration to sinus rhythm. TSH WNL    -Had been on warfarin however transitioned to heparin drip acutely   -Plan DOAC on discharge  -Monitor and replace electrolytes  -Cardiology involved  -Telemetry monitoring for reversion back into aflutter    Aortic stenosis, mild-moderate  Assessment & Plan  Systolic ejection murmur on exam with mild aortic stenosis noted on 06/2022 echocardiogram (AV area 1 cm2). Repeat echo with valvular area 0.88 cm2, mean gradient 13.3, peak velocity 2.5, LVEF 55. Patient reports she does not necessarily get dizzy or SOB with exertion, though occasionally when standing she will fall    -Outpatient monitoring for progression of stenosis    History of recurrent pulmonary embolism- (present on admission)  Assessment & Plan  Distant history of  Patient has an IVC filter in place, and is maintained on Coumadin.    -Coumadin will be held, we are starting her on heparin protocol which we will titrate to 10 a goal  -Would probably best be managed with a  DOAC discharge    S/P IVC filter  Assessment & Plan  Please several years ago for history of DVT and PE  She is on full dose anticoagulation with warfarin, will likely transition to DOAC    -We will discuss with radiology as regards advisability of removal though this is not emergent so probably best done as an outpatient in order to minimize cardiac risk    Type 2 diabetes mellitus (HCC)  Assessment & Plan  Patient is maintained on sitagliptin. A1c 6.8.    -Cover sliding scale initially on admission  -Would be a good candidate for an SGLT2 on discharge    Urinary incontinence  Assessment & Plan  Chronic since she had her children    -Monitor UOP while inpatient      Primary hypertension- (present on admission)  Assessment & Plan  Maintained on metoprolol 100 mg as an outpatient    -Nitroglycerin gtt to maintain SBP <150  -Monitor closely and titrate up GDMT post-cath and dependent upon her vital signs        DISPO: remain in ICU pending cardiac catheterization for NSTEMI    CODE STATUS: FULL    Quality Measures:  Feeding: npo for procedure  Analgesia: n/a  Sedation: n/a  Thromboprophylaxis: therapeutic heparin  Head of bed: >30 degrees  Ulcer prophylaxis: n/a  Glycemic control: Correctional: SSI regular  Bowel care: bowel regimen  Indwelling lines: PIV  Deescalation of antibiotics: n/a      Rand Russell M.D. PGY-2

## 2023-07-13 NOTE — PROGRESS NOTES
Critical Care Progress Note    Date of admission  7/12/2023    Chief Complaint  87 y.o. female admitted 7/12/2023 with atrial flutter with RVR, a hypertensive emergency and an acute NSTEMI.  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    Hospital Course      7/13 -    in sinus rhythm following SILVA cardioversion yesterday.  Titrating heparin drip.  Titrating nitroglycerin drip.  Continue aspirin and statin.  Continue metoprolol tartrate.  Awaiting cardiac catheterization.  7/14 -    she remains in sinus rhythm.  Cardiac catheterization yesterday with multivessel CAD.  Cardiothoracic surgery consultation.  Titrating nitroglycerin drip.  Titrating heparin drip.  Begin lisinopril, 5 mg daily.  Change metoprolol tartrate to metoprolol succinate.      Interval Problem Update  Reviewed last 24 hour events:      SR  Nitroglycerin drip  Heparin drip  98.0      Review of Systems  Review of Systems   Unable to perform ROS: Acuity of condition        Vital Signs for last 24 hours   Temp:  [35.9 °C (96.6 °F)-36.7 °C (98 °F)] 36.7 °C (98 °F)  Pulse:  [47-75] 73  Resp:  [13-56] 16  BP: ()/(49-98) 145/66  SpO2:  [88 %-100 %] 94 %    Hemodynamic parameters for last 24 hours       Respiratory Information for the last 24 hours       Physical Exam   Physical Exam  Constitutional:       Appearance: She is not diaphoretic.   HENT:      Head: Normocephalic.      Nose: Nose normal.      Mouth/Throat:      Pharynx: Oropharynx is clear.   Eyes:      General: No scleral icterus.     Pupils: Pupils are equal, round, and reactive to light.   Cardiovascular:      Comments: Sinus rhythm  Pulmonary:      Breath sounds: No wheezing or rales.   Abdominal:      General: There is no distension.      Tenderness: There is no abdominal tenderness.   Musculoskeletal:      Cervical  back: Normal range of motion.      Right lower leg: Edema present.      Left lower leg: Edema present.   Skin:     General: Skin is warm.      Capillary Refill: Capillary refill takes less than 2 seconds.   Neurological:      General: No focal deficit present.      Mental Status: She is oriented to person, place, and time.      Cranial Nerves: No cranial nerve deficit.         Medications  Current Facility-Administered Medications   Medication Dose Route Frequency Provider Last Rate Last Admin    metoprolol SR (Toprol XL) tablet 25 mg  25 mg Oral Q DAY Davi Stevens M.D.        aspirin EC tablet 81 mg  81 mg Oral DAILY Davi Stevens M.D.   81 mg at 07/14/23 0526    lisinopril (Prinivil) tablet 5 mg  5 mg Oral Q DAY Lexx Ramos M.D.   5 mg at 07/14/23 0526    rosuvastatin (Crestor) tablet 20 mg  20 mg Oral Q EVENING Lexx Ramos M.D.   20 mg at 07/13/23 1703    heparin infusion 25,000 units in 500 mL 0.45% NACL  0-30 Units/kg/hr (Adjusted) Intravenous Continuous LANDON Mcnamara.OSha 16.3 mL/hr at 07/14/23 0323 12 Units/kg/hr at 07/14/23 0323    heparin injection 2,000 Units  30 Units/kg (Adjusted) Intravenous PRN LANDON Mcnamara.OSha        levothyroxine (Synthroid) tablet 50 mcg  50 mcg Oral BID Truman Magdaleno D.O.   50 mcg at 07/14/23 0526    senna-docusate (Pericolace Or Senokot S) 8.6-50 MG per tablet 2 Tablet  2 Tablet Oral BID Truman Magdaleno D.O.   2 Tablet at 07/13/23 0503    And    polyethylene glycol/lytes (Miralax) PACKET 1 Packet  1 Packet Oral QDAY PRN LANDON Mcnamara.OSha        And    magnesium hydroxide (Milk Of Magnesia) suspension 30 mL  30 mL Oral QDAY PRN LANDON Mcnamara.OSha        And    bisacodyl (Dulcolax) suppository 10 mg  10 mg Rectal QDAY PRN LANDON Mcnamara.O.        Respiratory Therapy Consult   Nebulization Continuous RT Truman Magdaleno D.O.        acetaminophen (Tylenol) tablet 650 mg  650 mg  Oral Q6HRS PRN ALEX McnamaraOSha        ondansetron (Zofran) syringe/vial injection 4 mg  4 mg Intravenous Q4HRS PRN Truman Magdaleno D.O.        ondansetron (Zofran ODT) dispertab 4 mg  4 mg Oral Q4HRS PRN ALEX McnamaraOSha   4 mg at 07/12/23 1629    insulin regular (HumuLIN R,NovoLIN R) injection  3-18 Units Subcutaneous 4X/DAY ACHS LANDON Mcnamara.O.   3 Units at 07/13/23 1703    nitroglycerin 50 mg in D5W 250 ml infusion  0-200 mcg/min Intravenous Continuous Cony Santana A.P.R.N. 15 mL/hr at 07/14/23 0909 50 mcg/min at 07/14/23 0909    MD Alert...ICU Electrolyte Replacement per Pharmacy   Other PHARMACY TO DOSE Davi Stevens M.D.           Fluids    Intake/Output Summary (Last 24 hours) at 7/14/2023 1052  Last data filed at 7/14/2023 0800  Gross per 24 hour   Intake 925.2 ml   Output 101 ml   Net 824.2 ml       Laboratory          Recent Labs     07/12/23  0650 07/12/23  1214 07/13/23 0142 07/14/23 0145   SODIUM 138  --  140 134*   POTASSIUM 4.3  --  3.7 4.5   CHLORIDE 103  --  103 101   CO2 20  --  25 21   BUN 11  --  13 17   CREATININE 0.74  --  0.72 0.67   MAGNESIUM  --  1.6 2.4 2.1   PHOSPHORUS  --   --  4.0 3.1   CALCIUM 9.3  --  8.4* 8.9     Recent Labs     07/12/23  0650 07/13/23  0142 07/14/23  0145   ALTSGPT 15  --   --    ASTSGOT 32  --   --    ALKPHOSPHAT 143*  --   --    TBILIRUBIN 0.6  --   --    GLUCOSE 199* 107* 161*     Recent Labs     07/12/23  0650 07/13/23  0142 07/14/23  0145   WBC 9.5 10.5 7.9   NEUTSPOLYS 84.40*  --   --    LYMPHOCYTES 8.00*  --   --    MONOCYTES 5.80  --   --    EOSINOPHILS 0.20  --   --    BASOPHILS 0.40  --   --    ASTSGOT 32  --   --    ALTSGPT 15  --   --    ALKPHOSPHAT 143*  --   --    TBILIRUBIN 0.6  --   --      Recent Labs     07/12/23  0650 07/12/23  1214 07/13/23  0142 07/14/23  0145   RBC 4.53  --  3.94* 3.90*   HEMOGLOBIN 13.5  --  11.7* 11.4*   HEMATOCRIT 40.6  --  35.7* 35.0*   PLATELETCT 247  --  226 208    PROTHROMBTM 18.5* 18.0*  --   --    APTT 27.0 116.3*  --   --    INR 1.57* 1.52*  --   --        Imaging  None    Assessment/Plan  * Atrial flutter with rapid ventricular response (HCC)- (present on admission)  Assessment & Plan  She is in sinus rhythm following SILVA cardioversion on 7/12  Optimize potassium and magnesium  I am titrating heparin drip based upon serial anti-Xa monitoring  Metoprolol succinate, 25 mg daily    Hypertensive emergency  Assessment & Plan  Associated angina pectoris, acute NSTEMI and acute pulmonary edema  Goal SBP less than 150  I am titrating a nitroglycerin drip to achieve blood pressure control  Switch metoprolol tartrate to metoprolol succinate, 25 mg daily    NSTEMI (non-ST elevated myocardial infarction) ([facility])  Assessment & Plan  Acute non-STEMI  Echocardiogram with LVEF of 55%  Cardiac catheterization on 7/13 with severe multivessel calcified CAD with subtotally occluded mid LAD - cardiothoracic surgery consultation  I am titrating a heparin drip based upon serial anti-Xa determinations  Aspirin  Statin  Metoprolol succinate, 25 mg daily    Type 2 diabetes mellitus ([facility])  Assessment & Plan  Glycohemoglobin 6.8  Sliding scale insulin    Chronic anticoagulation- (present on admission)  Assessment & Plan  On chronic warfarin for history of recurrent PE/DVT  I am titrating a heparin drip based upon serial anti-Xa determinations    CAD (coronary artery disease)- (present on admission)  Assessment & Plan  Multivessel CAD on cardiac catheterization on 7/13  Cardiothoracic surgery consultation    Primary hypertension- (present on admission)  Assessment & Plan  Goal SBP less than 150  I am titrating a nitroglycerin drip to achieve blood pressure control  Begin lisinopril, 5 mg daily  Metoprolol succinate, 25 mg daily    S/P IVC filter  Assessment & Plan  IVC filter placement in 2015    Dyslipidemia- (present on admission)  Assessment & Plan  Continue statin    Breast cancer ([facility])- (present  on admission)  Assessment & Plan  History of stage IA infiltrating ductal carcinoma  S/P partial mastectomy in 2015 with adjuvant radiation    History of recurrent pulmonary embolism- (present on admission)  Assessment & Plan  On chronic anticoagulation with warfarin  IVC filter in place  I am titrating a heparin drip based upon serial anti-Xa monitoring         VTE:  Heparin  Ulcer: Not Indicated  Lines: None    I have performed a physical exam and reviewed and updated ROS and Plan today (7/14/2023). In review of yesterday's note (7/13/2023), there are no changes except as documented above.     I have assessed and reassessed her blood pressure, hemodynamics, cardiovascular status with titration of a nitroglycerin drip as well as serial determinations of her anticoagulation status with titration of heparin drip.  She is at increased risk for worsening cardiovascular and hematologic system dysfunction.    Discussed patient condition and risk of morbidity and/or mortality with RN, RT, Pharmacy, Charge nurse / hot rounds, and QA team    The patient remains critically ill.  Critical care time = 40 minutes in directly providing and coordinating critical care and extensive data review.  No time overlap and excludes procedures.    Davi Stevens MD  Pulmonary and Critical Care Medicine

## 2023-07-13 NOTE — CARE PLAN
The patient is Stable - Low risk of patient condition declining or worsening    Shift Goals  Clinical Goals: monitor bp, cath tomorrow  Patient Goals: be with family  Family Goals: keon    Progress made toward(s) clinical / shift goals:    Problem: Pain - Standard  Goal: Alleviation of pain or a reduction in pain to the patient’s comfort goal  Description: Target End Date:  Prior to discharge or change in level of care    Document on Vitals flowsheet    1.  Document pain using the appropriate pain scale per order or unit policy  2.  Educate and implement non-pharmacologic comfort measures (i.e. relaxation, distraction, massage, cold/heat therapy, etc.)  3.  Pain management medications as ordered  4.  Reassess pain after pain med administration per policy  5.  If opiods administered assess patient's response to pain medication is appropriate per POSS sedation scale  6.  Follow pain management plan developed in collaboration with patient and interdisciplinary team (including palliative care or pain specialists if applicable)  Outcome: Progressing     Problem: Fall Risk  Goal: Patient will remain free from falls  Description: Target End Date:  Prior to discharge or change in level of care    Document interventions on the [facility] Fall Risk Assessment    1.  Assess for fall risk factors  2.  Implement fall precautions  Outcome: Progressing       Patient is not progressing towards the following goals:

## 2023-07-13 NOTE — CONSULTS
"  REFERRING PHYSICIAN: William Cancino MD.     CONSULTING PHYSICIAN: Fernanda Cole MD.    CHIEF COMPLAINT: Chest pain    HISTORY OF PRESENT ILLNESS: The patient is a 87 y.o. female with a past medical history of breast CA with chest radiation, CAD, DVT/PE on chronic anticoagulation, GERD, HLD, HTN, hypothyroidism who presented to [facility] with chest pain on 7/11/23 and was transferred to [facility].  She was thought to be in SVT but was unresponsive to adenosine and when her rate was slowed she was found to have atrial flutter.  She was treated with diltiazem and her chest pain resolved at that time.  Her troponins were elevated and she was seen by cardiology.  She underwent a LHC which shows multivessel disease.    PAST MEDICAL HISTORY:   Past Medical History:   Diagnosis Date    Acute venous embolism and thrombosis of unspecified deep vessels of lower extremity     Angina pectoris 5/11/2012    \"with stress\"    Benign essential HTN 5/11/2012    CAD (coronary artery disease) 5/11/2012    Cancer (HCC)     right breast    Chronic anticoagulation 5/11/2012    Heart burn     Hyperlipidemia 5/11/2012    Hyperlipoproteinemia 5/11/2012    Indigestion     Obesity 5/11/2012    Other specified disorder of intestines     diarrhea    Pulmonary embolism (HCC)     Unspecified cataract     removed bilat    Unspecified disorder of thyroid     Unspecified urinary incontinence        PAST SURGICAL HISTORY:   Past Surgical History:   Procedure Laterality Date    MASTECTOMY  1/12/2015    Performed by Dwight Birch M.D. at SURGERY [facility] ORS    NODE BIOPSY SENTINEL  1/12/2015    Performed by Dwight Birch M.D. at SURGERY [facility]    AXILLARY NODE DISSECTION  1/12/2015    Performed by Dwight Birch M.D. at SURGERY [facility] ORS    APPENDECTOMY      CHOLECYSTECTOMY      HYSTERECTOMY, TOTAL ABDOMINAL      OTHER      Bladder    TONSILLECTOMY AND ADENOIDECTOMY         FAMILY HISTORY:   Family History   Problem " "Relation Age of Onset    Heart Disease Mother     Heart Disease Father         SOCIAL HISTORY:   Social History     Socioeconomic History    Marital status:      Spouse name: Not on file    Number of children: Not on file    Years of education: Not on file    Highest education level: Not on file   Occupational History    Not on file   Tobacco Use    Smoking status: Never    Smokeless tobacco: Never   Vaping Use    Vaping Use: Never used   Substance and Sexual Activity    Alcohol use: Yes     Comment: \"Couple drinks every night\"    Drug use: No    Sexual activity: Not on file   Other Topics Concern    Not on file   Social History Narrative    Not on file     Social Determinants of Health     Financial Resource Strain: Not on file   Food Insecurity: Not on file   Transportation Needs: Not on file   Physical Activity: Not on file   Stress: Not on file   Social Connections: Not on file   Intimate Partner Violence: Not on file   Housing Stability: Not on file       ALLERGIES:   Allergies   Allergen Reactions    Sulfa Drugs      Swelling    Levofloxacin Rash     Rash          CURRENT MEDICATIONS:     Current Facility-Administered Medications:     [START ON 7/14/2023] aspirin EC tablet 81 mg, 81 mg, Oral, DAILY, Davi Stevens M.D.    lisinopril (Prinivil) tablet 5 mg, 5 mg, Oral, Q DAY, Lexx Ramos M.D., 5 mg at 07/13/23 1245    rosuvastatin (Crestor) tablet 20 mg, 20 mg, Oral, Q EVENING, Lexx Ramos M.D.    heparin infusion 25,000 units in 500 mL 0.45% NACL, 0-30 Units/kg/hr (Adjusted), Intravenous, Continuous, Truman Magdaleno D.O., Last Rate: 16.3 mL/hr at 07/13/23 1627, 12 Units/kg/hr at 07/13/23 1627    heparin injection 2,000 Units, 30 Units/kg (Adjusted), Intravenous, PRN, LANDON Mcnamara.OSha    levothyroxine (Synthroid) tablet 50 mcg, 50 mcg, Oral, BID, Truman Magdaleno D.OSha, 50 mcg at 07/13/23 0503    senna-docusate (Pericolace Or Senokot S) 8.6-50 MG per tablet 2 " Tablet, 2 Tablet, Oral, BID, 2 Tablet at 07/13/23 0503 **AND** polyethylene glycol/lytes (Miralax) PACKET 1 Packet, 1 Packet, Oral, QDAY PRN **AND** magnesium hydroxide (Milk Of Magnesia) suspension 30 mL, 30 mL, Oral, QDAY PRN **AND** bisacodyl (Dulcolax) suppository 10 mg, 10 mg, Rectal, QDAY PRN, Truman Magdaleno D.O.    Respiratory Therapy Consult, , Nebulization, Continuous RT, Truman Magdaleno D.O.    acetaminophen (Tylenol) tablet 650 mg, 650 mg, Oral, Q6HRS PRN, ALEX McnamaraO.    metoprolol tartrate (Lopressor) tablet 25 mg, 25 mg, Oral, TWICE DAILY, ALEX McnamaraO., 25 mg at 07/13/23 0503    ondansetron (Zofran) syringe/vial injection 4 mg, 4 mg, Intravenous, Q4HRS PRN, LANDON Mcnamara.O.    ondansetron (Zofran ODT) dispertab 4 mg, 4 mg, Oral, Q4HRS PRN, ALEX McnamaraO., 4 mg at 07/12/23 1629    insulin regular (HumuLIN R,NovoLIN R) injection, 3-18 Units, Subcutaneous, 4X/DAY ACHS, ALEX McnamaraO., 3 Units at 07/12/23 1959    nitroglycerin 50 mg in D5W 250 ml infusion, 0-200 mcg/min, Intravenous, Continuous, TROY Cahney, Stopped at 07/13/23 1555    MD Alert...ICU Electrolyte Replacement per Pharmacy, , Other, PHARMACY TO DOSE, Davi Stevens M.D.     LABS REVIEWED:  Lab Results   Component Value Date/Time    SODIUM 140 07/13/2023 01:42 AM    POTASSIUM 3.7 07/13/2023 01:42 AM    CHLORIDE 103 07/13/2023 01:42 AM    CO2 25 07/13/2023 01:42 AM    GLUCOSE 107 (H) 07/13/2023 01:42 AM    BUN 13 07/13/2023 01:42 AM    CREATININE 0.72 07/13/2023 01:42 AM      Lab Results   Component Value Date/Time    PROTHROMBTM 18.0 (H) 07/12/2023 12:14 PM    INR 1.52 (H) 07/12/2023 12:14 PM      Lab Results   Component Value Date/Time    WBC 10.5 07/13/2023 01:42 AM    RBC 3.94 (L) 07/13/2023 01:42 AM    HEMOGLOBIN 11.7 (L) 07/13/2023 01:42 AM    HEMATOCRIT 35.7 (L) 07/13/2023 01:42 AM    MCV 90.6 07/13/2023 01:42 AM    MCH 29.7  07/13/2023 01:42 AM    MCHC 32.8 07/13/2023 01:42 AM    MPV 10.3 07/13/2023 01:42 AM    NEUTSPOLYS 84.40 (H) 07/12/2023 06:50 AM    LYMPHOCYTES 8.00 (L) 07/12/2023 06:50 AM    MONOCYTES 5.80 07/12/2023 06:50 AM    EOSINOPHILS 0.20 07/12/2023 06:50 AM    BASOPHILS 0.40 07/12/2023 06:50 AM        IMAGING REVIEWED AND INTERPRETED:    ECHOCARDIOGRAM: 7/12/23 [facility]  Left ventricular systolic function is normal.  Calcified aortic valve leaflets with moderately reduced motion.  Mild-Moderate aortic valve stenosis.  Transvalvular gradients are - Peak: 25 mmHg, Mean: 13 mmHg.    ANGIOGRAM: 7/13/23 [facility]  HEMODYNAMICS:  Aortic pressure: 117 /50/78 mmHg  LVEDP: 11 mmHg  Peak-Peak gradient across AV 20mmHg (mild-moderate AS)     CORONARY ANGIOGRAPHY:  The left main coronary artery : Large-caliber vessel that bifurcates to LAD and left circumflex  The left anterior descending coronary artery : Large in caliber vessel with subtotal occlusion in its mid segment after takeoff of 2 diagonal branches.  Unsure if transapical vessel, with CARLINE 1 flow.  The diagonal branches appear to be medium-large in caliber with severe ostial/proximal 80-90% stenoses in both.  The left circumflex coronary artery : Large-caliber vessel that gives rise to a high OM1 that appears to be large in caliber with diffuse proximal/mid 80-90% disease.  The circumflex has a diffuse segment of calcified 70-80% tubular stenosis throughout its midportion before gives rise to 2 OM branches.  Large OM 2 with severe ostial 90% stenosis.  Medium-large in caliber OM 3 with severe 90% proximal/mid stenosis.  The right coronary artery  : Large in caliber calcified vessel with diffuse nonobstructive CAD.     IMPRESSION:  1.  Severe multivessel calcified CAD including subtotally occluded mid LAD, not very optimal for PCI given the anatomy of the severe stenoses and calcifications.  2.  Normal resting LVEDP 11 mmHg   3.  Mild-moderate AS        RECOMMENDATIONS:  Appreciate CT  surgical consultation for CABG   Resume heparin drip  Resume nitroglycerin drip per primary team  Multidisciplinary discussions involving consulting cardiology, primary team, interventional cardiology and cardiothoracic surgery for optimal approach for multivessel severe disease.  I recommend either surgical consultation for CABG or optimal medical therapy rather than PCI given her anatomy.  TR band protocol  Ongoing secondary ASCVD prevention    REVIEW OF SYSTEMS:   Review of Systems   Constitutional:  Positive for malaise/fatigue. Negative for fever.   HENT: Negative.     Eyes: Negative.    Respiratory:  Positive for shortness of breath.    Cardiovascular:  Positive for chest pain and palpitations.   Gastrointestinal: Negative.    Genitourinary: Negative.    Musculoskeletal: Negative.    Skin: Negative.    Neurological: Negative.    Endo/Heme/Allergies: Negative.          PHYSICAL EXAMINATION:   Physical Exam  Vitals reviewed.   Constitutional:       General: She is not in acute distress.     Appearance: Normal appearance.      Comments: Frail very soft spoken woman   HENT:      Head: Normocephalic and atraumatic.      Right Ear: External ear normal.      Left Ear: External ear normal.      Nose: Nose normal. No congestion.   Eyes:      General: No scleral icterus.     Extraocular Movements: Extraocular movements intact.      Conjunctiva/sclera: Conjunctivae normal.   Cardiovascular:      Rate and Rhythm: Normal rate and regular rhythm.   Pulmonary:      Effort: Pulmonary effort is normal. No respiratory distress.   Abdominal:      General: Abdomen is flat. There is no distension.   Musculoskeletal:         General: Normal range of motion.      Cervical back: Normal range of motion.   Skin:     General: Skin is warm and dry.   Neurological:      Mental Status: She is alert and oriented to person, place, and time. Mental status is at baseline.      Cranial Nerves: No cranial nerve deficit.   Psychiatric:          "Mood and Affect: Mood normal.         Judgment: Judgment normal.       /61   Pulse 69   Temp 36.1 °C (96.9 °F) (Temporal)   Resp (!) 56   Ht 1.651 m (5' 5\")   Wt 75.8 kg (167 lb 1.7 oz)   SpO2 95%   BMI 27.81 kg/m²        IMPRESSION:  86yo woman with known conditions and a past medical history of breast CA with chest radiation, CAD, DVT/PE on chronic anticoagulation, GERD, HLD, HTN, hypothyroidism here with NSTEMI, multivessel CAD not amenable to PCI, moderate AS.      PLAN:  I recommend against CABG for this patient. I explained to Nancy and her daughter/KENTON that the calculated risk of CABG far underestimates the true likelihood of complicated recovery for this frail, very sedentary woman. With mild to moderate stenosis on TTE we would have to be prepared for AVR at time of CABG as well and given her already deconditioned state she is more likely to be institutionalized due to prolonged recovery post op and to have limited quality of life after the procedure. I was walt with the patient and family that this is likely a mortal condition given that she has been deemed not suitable for PCI and they confirm that quality of life is of utmost importance and they understand.     The operative mortality risk is approximately 5-6%. The STS mortality risk score is 4.3% and the morbidity and mortality risk score is 10.7%. The scores were discussed with patient.    Findings and recommendations have been discussed with the patient’s cardiologist Lexx Ramos MD.  Thank you for this very challenging consultation and participation in the patient’s care.  I will keep you apprised of all future developments.          Sincerely,       Fernanda Cole MD.        "

## 2023-07-13 NOTE — PROCEDURES
Cardiac Catheterization Laboratory Procedure Note    DATE: 7/13/2023    : William Cancino MD, MPH    PROCEDURES PERFORMED:  Left heart catheterization  Coronary angiography  Moderate conscious sedation    INDICATIONS:  NSTEMI    CONSENT:  The complete alternatives, risks, and benefits of the procedure were explained to the patient. Signed informed consent was obtained and placed in the chart prior to the procedure.    MEDICATIONS:  Lidocaine  Fentanyl  Midazolam  Nitroglycerin  Verapamil  Heparin    MODERATE CONSCIOUS SEDATION:  I personally supervised the administration of moderate conscious sedation by the nursing staff for 27 minutes.  Start time: 1547  End time: 1640    CONTRAST: Omnipaque 11 cc    RADIATION DOSE (Air Kerma): 76 mGy    FLUOROSCOPY TIME: 7 minutes    ACCESS:  4-[place] citizen Glidesheath in the right radial artery. The 6-Fr sheath would not advance past 2cm into the skin hence a 4-Fr system was used.    ESTIMATED BLOOD LOSS: <10 cc    COMPLICATIONS: None    PROCEDURE IN DETAIL:  The patient was brought to the cardiac catheterization laboratory in the fasting state. The skin over the right wrist was prepped and draped in the usual sterile fashion. Lidocaine infiltration was used to anesthetize the tissue over the right radial artery. Using the micropuncture technique, a 4-[place] citizen Glidesheath was inserted in the right radial artery. A 4-Fr AR1 catheter was then advanced over a standard J-wire into the left ventricular cavity where it was gently aspirated, flushed, and then withdrawn across the aortic valve with sequential pressures measured. This catheter was then used to engage the ostium of the right coronary artery and cineangiograms were obtained in multiple projections for complete evaluation of the right coronary system. This catheter was then exchanged over exchange length J-wire to a 4-Fr JL3.5 diagnostic catheter which was used to engage the ostium of the left coronary artery and  cineangiograms were obtained in multiple projections for complete evaluation of the left coronary system. Following completion of coronary angiography, all wires, catheters, and sheaths were removed.  A TR band was placed over the right wrist using the patent hemostasis technique.     HEMODYNAMICS:  Aortic pressure: 117 /50/78 mmHg  LVEDP: 11 mmHg  Peak-Peak gradient across AV 20mmHg (mild-moderate AS)    CORONARY ANGIOGRAPHY:  The left main coronary artery : Large-caliber vessel that bifurcates to LAD and left circumflex  The left anterior descending coronary artery : Large in caliber vessel with subtotal occlusion in its mid segment after takeoff of 2 diagonal branches.  Unsure if transapical vessel, with CARLINE 1 flow.  The diagonal branches appear to be medium-large in caliber with severe ostial/proximal 80-90% stenoses in both.  The left circumflex coronary artery : Large-caliber vessel that gives rise to a high OM1 that appears to be large in caliber with diffuse proximal/mid 80-90% disease.  The circumflex has a diffuse segment of calcified 70-80% tubular stenosis throughout its midportion before gives rise to 2 OM branches.  Large OM 2 with severe ostial 90% stenosis.  Medium-large in caliber OM 3 with severe 90% proximal/mid stenosis.  The right coronary artery  : Large in caliber calcified vessel with diffuse nonobstructive CAD.    IMPRESSION:  1.  Severe multivessel calcified CAD including subtotally occluded mid LAD, not very optimal for PCI given the anatomy of the severe stenoses and calcifications.  2.  Normal resting LVEDP 11 mmHg   3.  Mild-moderate AS      RECOMMENDATIONS:  Appreciate CT surgical consultation for CABG   Resume heparin drip  Resume nitroglycerin drip per primary team  Multidisciplinary discussions involving consulting cardiology, primary team, interventional cardiology and cardiothoracic surgery for optimal approach for multivessel severe disease.  I recommend either surgical  consultation for CABG or optimal medical therapy rather than PCI given her anatomy.  TR band protocol  Ongoing secondary ASCVD prevention    NOTIFICATION:  The patient's  was called and notified of the results.    Referring Cardiologist - Dr. Ramos - was notified.    William Cancino MD, MPH [facility]  Interventional Cardiologist  [facility]   of Clinical Internal Medicine - [facility]

## 2023-07-13 NOTE — PROGRESS NOTES
Interval:  No acute events overnight. Remains sinus on telemetry.     Medications / Drug list prior to admission:  No current facility-administered medications on file prior to encounter.     Current Outpatient Medications on File Prior to Encounter   Medication Sig Dispense Refill    levothyroxine (SYNTHROID) 50 MCG Tab Take 50 mcg by mouth 2 times a day.      metoprolol tartrate (LOPRESSOR) 100 MG Tab Take 100 mg by mouth every morning.      acetaminophen (TYLENOL 8 HOUR ARTHRITIS PAIN) 650 MG CR tablet Take 1,300 mg by mouth every 6 hours as needed for Moderate Pain.      B Complex Vitamins (VITAMIN B COMPLEX) Tab Take 1 Tablet by mouth every morning.      simvastatin (ZOCOR) 40 MG Tab TAKE 1 TABLET BY MOUTH IN  THE EVENING 90 Tablet 0    nitroglycerin (NITROSTAT) 0.4 MG SL Tab DISSOLVE 1 TABLET UNDER THE TONGUE EVERY 5 MINUTES AS  NEEDED FOR CHEST PAIN. MAX  OF 3 TABLETS IN 15 MINUTES. CALL 911 IF PAIN PERSISTS. 50 Tablet 7    JANUVIA 100 MG Tab TAKE 1 TABLET BY MOUTH  DAILY 90 Tablet 3    warfarin (COUMADIN) 1 MG Tab TAKE 2 AND 1/2 TABLETS BY  MOUTH DAILY AS DIRECTED BY  [facility] ANTICOAGULATION  SERVICES 225 Tablet 3       Current list of administered Medications:    Current Facility-Administered Medications:     [START ON 7/14/2023] aspirin EC tablet 81 mg, 81 mg, Oral, DAILY, Davi Stevens M.D.    heparin infusion 25,000 units in 500 mL 0.45% NACL, 0-30 Units/kg/hr (Adjusted), Intravenous, Continuous, LANDON Mcnamara.OSha, Last Rate: 16.3 mL/hr at 07/13/23 0653, 12 Units/kg/hr at 07/13/23 0653    heparin injection 2,000 Units, 30 Units/kg (Adjusted), Intravenous, PRN, Truman Magdaleno D.O.    levothyroxine (Synthroid) tablet 50 mcg, 50 mcg, Oral, BID, Truman Magdaleno D.O., 50 mcg at 07/13/23 0503    simvastatin (Zocor) tablet 40 mg, 40 mg, Oral, Q EVENING, Truman Magdaleno D.O., 40 mg at 07/12/23 1727    senna-docusate (Pericolace Or Senokot S) 8.6-50 MG per tablet 2  "Tablet, 2 Tablet, Oral, BID, 2 Tablet at 07/13/23 0503 **AND** polyethylene glycol/lytes (Miralax) PACKET 1 Packet, 1 Packet, Oral, QDAY PRN **AND** magnesium hydroxide (Milk Of Magnesia) suspension 30 mL, 30 mL, Oral, QDAY PRN **AND** bisacodyl (Dulcolax) suppository 10 mg, 10 mg, Rectal, QDAY PRN, Truman Magdaleno D.O.    Respiratory Therapy Consult, , Nebulization, Continuous RT, LANDON Mcnamara.OSha    acetaminophen (Tylenol) tablet 650 mg, 650 mg, Oral, Q6HRS PRN, ALEX McnamaraO.    metoprolol tartrate (Lopressor) tablet 25 mg, 25 mg, Oral, TWICE DAILY, ALEX McnamaraO., 25 mg at 07/13/23 0503    ondansetron (Zofran) syringe/vial injection 4 mg, 4 mg, Intravenous, Q4HRS PRN, LANDON Mcnamara.O.    ondansetron (Zofran ODT) dispertab 4 mg, 4 mg, Oral, Q4HRS PRN, LANDON Mcnamara.O., 4 mg at 07/12/23 1629    insulin regular (HumuLIN R,NovoLIN R) injection, 3-18 Units, Subcutaneous, 4X/DAY ACHS, LANDON Mcnamara.O., 3 Units at 07/12/23 1959    nitroglycerin 50 mg in D5W 250 ml infusion, 0-200 mcg/min, Intravenous, Continuous, KATRINA ChaneyP.R.N., Last Rate: 9 mL/hr at 07/13/23 0832, 30 mcg/min at 07/13/23 0832    MD Alert...ICU Electrolyte Replacement per Pharmacy, , Other, PHARMACY TO DOSE, Davi Stevens M.D.    iohexol (OMNIPAQUE) 350 mg/mL, 1-300 mL, Intra-arterial, Once, William Cancino M.D.    Past Medical History:   Diagnosis Date    Acute venous embolism and thrombosis of unspecified deep vessels of lower extremity     Angina pectoris 5/11/2012    \"with stress\"    Benign essential HTN 5/11/2012    CAD (coronary artery disease) 5/11/2012    Cancer (HCC)     right breast    Chronic anticoagulation 5/11/2012    Heart burn     Hyperlipidemia 5/11/2012    Hyperlipoproteinemia 5/11/2012    Indigestion     Obesity 5/11/2012    Other specified disorder of intestines     diarrhea    Pulmonary embolism (HCC)     Unspecified cataract  " "   removed bilat    Unspecified disorder of thyroid     Unspecified urinary incontinence        Past Surgical History:   Procedure Laterality Date    MASTECTOMY  1/12/2015    Performed by Dwight Birch M.D. at SURGERY [facility]    NODE BIOPSY SENTINEL  1/12/2015    Performed by Dwight Birch M.D. at SURGERY [facility]    AXILLARY NODE DISSECTION  1/12/2015    Performed by Dwight Birch M.D. at SURGERY [facility]    APPENDECTOMY      CHOLECYSTECTOMY      HYSTERECTOMY, TOTAL ABDOMINAL      OTHER      Bladder    TONSILLECTOMY AND ADENOIDECTOMY         Family History   Problem Relation Age of Onset    Heart Disease Mother     Heart Disease Father      Patient family history was personally reviewed, no pertinent family history to current presentation    Social History     Socioeconomic History    Marital status:      Spouse name: Not on file    Number of children: Not on file    Years of education: Not on file    Highest education level: Not on file   Occupational History    Not on file   Tobacco Use    Smoking status: Never    Smokeless tobacco: Never   Vaping Use    Vaping Use: Never used   Substance and Sexual Activity    Alcohol use: Yes     Comment: \"Couple drinks every night\"    Drug use: No    Sexual activity: Not on file   Other Topics Concern    Not on file   Social History Narrative    Not on file     Social Determinants of Health     Financial Resource Strain: Not on file   Food Insecurity: Not on file   Transportation Needs: Not on file   Physical Activity: Not on file   Stress: Not on file   Social Connections: Not on file   Intimate Partner Violence: Not on file   Housing Stability: Not on file       ALLERGIES:  Allergies   Allergen Reactions    Sulfa Drugs      Swelling    Levofloxacin Rash     Rash         Review of systems:  A complete review of symptoms was reviewed with patient. This is reviewed in H&P and PMH. ALL OTHERS reviewed and negative    Physical exam:  Vitals:    " 07/13/23 0930 07/13/23 0945 07/13/23 1000 07/13/23 1015   BP: 136/62 121/60 122/58 (!) 148/64   Pulse: (!) 57 66 61 61   Resp: 16 18 13 15   Temp:       TempSrc:       SpO2: 94% 95% 97% 95%   Weight:       Height:         General: No acute distress.   EYES: no jaundice  HEENT: OP clear   Neck: No bruits No JVD.   CVS:  RRR. S1 + S2. No M/R/G. No edema.  Resp: CTAB. No wheezing or crackles/rhonchi.  Abdomen: Soft, NT, ND,  Skin: Grossly nothing acute no obvious rashes  Neurological: Alert, Moves all extremities, no cranial nerve defects on limited exam  Extremities: Pulse 2+ in b/l LE. No cyanosis.     Data:  Laboratory studies personally reviewed by me:  Recent Results (from the past 24 hour(s))   Troponin in two (2) hours    Collection Time: 07/12/23 12:14 PM   Result Value Ref Range    Troponin T 973 (H) 6 - 19 ng/L   Magnesium    Collection Time: 07/12/23 12:14 PM   Result Value Ref Range    Magnesium 1.6 1.5 - 2.5 mg/dL   HEMOGLOBIN A1C    Collection Time: 07/12/23 12:14 PM   Result Value Ref Range    Glycohemoglobin 6.8 (H) 4.0 - 5.6 %    Est Avg Glucose 148 mg/dL   TSH    Collection Time: 07/12/23 12:14 PM   Result Value Ref Range    TSH 2.230 0.380 - 5.330 uIU/mL   aPTT    Collection Time: 07/12/23 12:14 PM   Result Value Ref Range    APTT 116.3 (HH) 24.7 - 36.0 sec   Prothrombin Time    Collection Time: 07/12/23 12:14 PM   Result Value Ref Range    PT 18.0 (H) 12.0 - 14.6 sec    INR 1.52 (H) 0.87 - 1.13   Heparin Xa (Unfractionated)    Collection Time: 07/12/23 12:14 PM   Result Value Ref Range    Heparin Xa (UFH) 0.67 IU/mL   POCT glucose device results    Collection Time: 07/12/23 12:18 PM   Result Value Ref Range    POC Glucose, Blood 208 (H) 65 - 99 mg/dL   POCT glucose device results    Collection Time: 07/12/23  5:29 PM   Result Value Ref Range    POC Glucose, Blood 181 (H) 65 - 99 mg/dL   POCT glucose device results    Collection Time: 07/12/23  7:52 PM   Result Value Ref Range    POC Glucose, Blood  152 (H) 65 - 99 mg/dL   Heparin Anti-Xa    Collection Time: 07/12/23  8:00 PM   Result Value Ref Range    Heparin Xa (UFH) 0.35 IU/mL   EC-ECHOCARDIOGRAM COMPLETE W/O CONT    Collection Time: 07/12/23 10:08 PM   Result Value Ref Range    Left Ventrical Ejection Fraction 55    Basic Metabolic Panel (BMP)    Collection Time: 07/13/23  1:42 AM   Result Value Ref Range    Sodium 140 135 - 145 mmol/L    Potassium 3.7 3.6 - 5.5 mmol/L    Chloride 103 96 - 112 mmol/L    Co2 25 20 - 33 mmol/L    Glucose 107 (H) 65 - 99 mg/dL    Bun 13 8 - 22 mg/dL    Creatinine 0.72 0.50 - 1.40 mg/dL    Calcium 8.4 (L) 8.5 - 10.5 mg/dL    Anion Gap 12.0 7.0 - 16.0   CBC without Differential    Collection Time: 07/13/23  1:42 AM   Result Value Ref Range    WBC 10.5 4.8 - 10.8 K/uL    RBC 3.94 (L) 4.20 - 5.40 M/uL    Hemoglobin 11.7 (L) 12.0 - 16.0 g/dL    Hematocrit 35.7 (L) 37.0 - 47.0 %    MCV 90.6 81.4 - 97.8 fL    MCH 29.7 27.0 - 33.0 pg    MCHC 32.8 32.2 - 35.5 g/dL    RDW 49.1 35.9 - 50.0 fL    Platelet Count 226 164 - 446 K/uL    MPV 10.3 9.0 - 12.9 fL   MAGNESIUM    Collection Time: 07/13/23  1:42 AM   Result Value Ref Range    Magnesium 2.4 1.5 - 2.5 mg/dL   PHOSPHORUS    Collection Time: 07/13/23  1:42 AM   Result Value Ref Range    Phosphorus 4.0 2.5 - 4.5 mg/dL   Lipid Profile    Collection Time: 07/13/23  1:42 AM   Result Value Ref Range    Cholesterol,Tot 126 100 - 199 mg/dL    Triglycerides 100 0 - 149 mg/dL    HDL 40 >=40 mg/dL    LDL 66 <100 mg/dL   Heparin Anti-Xa    Collection Time: 07/13/23  1:42 AM   Result Value Ref Range    Heparin Xa (UFH) 0.44 IU/mL   ESTIMATED GFR    Collection Time: 07/13/23  1:42 AM   Result Value Ref Range    GFR (CKD-EPI) 81 >60 mL/min/1.73 m 2   POCT glucose device results    Collection Time: 07/13/23  6:16 AM   Result Value Ref Range    POC Glucose, Blood 128 (H) 65 - 99 mg/dL   EKG    Collection Time: 07/13/23 10:16 AM   Result Value Ref Range    Report       [facility]    Test Date:   2023  Pt Name:    URSULA MARTÍNZE                Department: 161  MRN:        [id number]                      Room:       T613  Gender:     Female                       Technician: [facility]  :        [id number]                   Requested By:SIM MENESES  Order #:    [id number]                    Reading MD:    Measurements  Intervals                                Axis  Rate:       60                           P:          58  FL:         165                          QRS:        12  QRSD:       96                           T:          39  QT:         468  QTc:        468    Interpretive Statements  Sinus rhythm  Probable left atrial enlargement  Left ventricular hypertrophy  Baseline wander in lead(s) III,aVF  Compared to ECG 2023 06:39:19  Left ventricular hypertrophy now present  Atrial flutter no longer present  Myocardial infarct finding no longer present  Early repolarization no longer present  Possible ischemia no longer present       EKG 23 0639 atrial flutter 113 bpm, LVH, nonspecific t changes    All pertinent features of laboratory and imaging reviewed including primary images where applicable    TTE 23  CONCLUSIONS  Left ventricular systolic function is normal.  Calcified aortic valve leaflets with moderately reduced motion.  Mild-Moderate aortic valve stenosis.  Transvalvular gradients are - Peak: 25 mmHg, Mean: 13 mmHg.    Principal Problem:    Atrial flutter with rapid ventricular response (HCC) (POA: Yes)  Active Problems:    Hypertensive emergency (POA: Unknown)    Primary hypertension (POA: Yes)    CAD (coronary artery disease) (Chronic) (POA: Yes)    History of recurrent pulmonary embolism (POA: Yes)    Dyslipidemia (POA: Yes)    NSTEMI (non-ST elevated myocardial infarction) (HCC) (POA: Unknown)    Urinary incontinence (POA: Unknown)    Type 2 diabetes mellitus (HCC) (POA: Unknown)    S/P IVC filter (POA: Unknown)    Chronic anticoagulation (Chronic) (POA: Yes)    Breast cancer  (HCC) (POA: Yes)  Resolved Problems:    * No resolved hospital problems. *      Assessment / Plan:  87 year old woman with PMH CAD, HTN, HLD, DM2 presents with angina found nstemi and new atrial flutter. Incidental mild to mod AS on TTE. Previously found functional testing with apical ischemia.    -s/p john/dccv 7/12/23 to sinus rhythm  -continue aspirin and hep gtt  -continue statin. Change to high intensity.   -acei for BP control. Continue BB for rate control. Both dual purpose.   -pending [facility] today    I personally discussed her case with Dr Stevens    It is my pleasure to participate in the care of Ms. King.  Please do not hesitate to contact me with questions or concerns.    Lexx Ramos MD  Cardiologist [facility]     A total of 55 minutes of time was spent on day of encounter reviewing medical record, performing history and examination, counseling, ordering medication/test/consults and documentation.

## 2023-07-13 NOTE — PROGRESS NOTES
4 Eyes Skin Assessment Completed by ZAK Ledesma and ZAK Mercer.    Head WDL  Ears WDL  Nose WDL  Mouth WDL  Neck WDL  Breast/Chest Redness and Non-Blanching- post removal of defib pads   Shoulder Blades WDL  Spine Redness and Non-Blanching, removal of d-fib pads   (R) Arm/Elbow/Hand WDL  (L) Arm/Elbow/Hand WDL  Abdomen WDL  Groin WDL  Scrotum/Coccyx/Buttocks Redness, Non-Blanching, Excoriation, and Discoloration  (R) Leg WDL  (L) Leg WDL  (R) Heel/Foot/Toe WDL  (L) Heel/Foot/Toe WDL          Devices In Places ECG, Tele Box, Blood Pressure Cuff, and Pulse Ox      Interventions In Place Q2 Turns, Low Air Loss Mattress, and Heels Loaded W/Pillows    Possible Skin Injury Yes    Pictures Uploaded Into Epic Yes  Wound Consult Placed Yes  RN Wound Prevention Protocol Ordered Yes

## 2023-07-13 NOTE — CARE PLAN
Problem: Pain - Standard  Goal: Alleviation of pain or a reduction in pain to the patient’s comfort goal  Outcome: Progressing     Problem: Knowledge Deficit - Standard  Goal: Patient and family/care givers will demonstrate understanding of plan of care, disease process/condition, diagnostic tests and medications  Outcome: Progressing   The patient is Stable - Low risk of patient condition declining or worsening    Shift Goals  Clinical Goals: monitor bp, cath tomorrow  Patient Goals: be with family  Family Goals: keon    Progress made toward(s) clinical / shift goals:  no pain, updated on poc, pt to cath lab     Patient is not progressing towards the following goals:

## 2023-07-13 NOTE — CARE PLAN
Problem: Pain - Standard  Goal: Alleviation of pain or a reduction in pain to the patient’s comfort goal  Outcome: Progressing     Problem: Knowledge Deficit - Standard  Goal: Patient and family/care givers will demonstrate understanding of plan of care, disease process/condition, diagnostic tests and medications  Outcome: Progressing   The patient is Stable - Low risk of patient condition declining or worsening    Shift Goals  Clinical Goals: stable CV function, HR/BP control, anxiety reduction  Patient Goals: heart cath  Family Goals: keon    Progress made toward(s) clinical / shift goals:  no pain, all needs met, pt updated on poc     Patient is not progressing towards the following goals:

## 2023-07-14 LAB
ANION GAP SERPL CALC-SCNC: 12 MMOL/L (ref 7–16)
BUN SERPL-MCNC: 17 MG/DL (ref 8–22)
CALCIUM SERPL-MCNC: 8.9 MG/DL (ref 8.5–10.5)
CHLORIDE SERPL-SCNC: 101 MMOL/L (ref 96–112)
CO2 SERPL-SCNC: 21 MMOL/L (ref 20–33)
CREAT SERPL-MCNC: 0.67 MG/DL (ref 0.5–1.4)
ERYTHROCYTE [DISTWIDTH] IN BLOOD BY AUTOMATED COUNT: 48.5 FL (ref 35.9–50)
GFR SERPLBLD CREATININE-BSD FMLA CKD-EPI: 85 ML/MIN/1.73 M 2
GLUCOSE BLD STRIP.AUTO-MCNC: 103 MG/DL (ref 65–99)
GLUCOSE BLD STRIP.AUTO-MCNC: 125 MG/DL (ref 65–99)
GLUCOSE BLD STRIP.AUTO-MCNC: 145 MG/DL (ref 65–99)
GLUCOSE BLD STRIP.AUTO-MCNC: 154 MG/DL (ref 65–99)
GLUCOSE SERPL-MCNC: 161 MG/DL (ref 65–99)
HCT VFR BLD AUTO: 35 % (ref 37–47)
HGB BLD-MCNC: 11.4 G/DL (ref 12–16)
MAGNESIUM SERPL-MCNC: 2.1 MG/DL (ref 1.5–2.5)
MCH RBC QN AUTO: 29.2 PG (ref 27–33)
MCHC RBC AUTO-ENTMCNC: 32.6 G/DL (ref 32.2–35.5)
MCV RBC AUTO: 89.7 FL (ref 81.4–97.8)
PHOSPHATE SERPL-MCNC: 3.1 MG/DL (ref 2.5–4.5)
PLATELET # BLD AUTO: 208 K/UL (ref 164–446)
PMV BLD AUTO: 10.3 FL (ref 9–12.9)
POTASSIUM SERPL-SCNC: 4.5 MMOL/L (ref 3.6–5.5)
RBC # BLD AUTO: 3.9 M/UL (ref 4.2–5.4)
SODIUM SERPL-SCNC: 134 MMOL/L (ref 135–145)
UFH PPP CHRO-ACNC: 0.34 IU/ML
WBC # BLD AUTO: 7.9 K/UL (ref 4.8–10.8)

## 2023-07-14 PROCEDURE — 700102 HCHG RX REV CODE 250 W/ 637 OVERRIDE(OP): Performed by: HOSPITALIST

## 2023-07-14 PROCEDURE — 82962 GLUCOSE BLOOD TEST: CPT | Mod: 91

## 2023-07-14 PROCEDURE — 84100 ASSAY OF PHOSPHORUS: CPT

## 2023-07-14 PROCEDURE — 99291 CRITICAL CARE FIRST HOUR: CPT | Performed by: INTERNAL MEDICINE

## 2023-07-14 PROCEDURE — A9270 NON-COVERED ITEM OR SERVICE: HCPCS | Performed by: INTERNAL MEDICINE

## 2023-07-14 PROCEDURE — 99223 1ST HOSP IP/OBS HIGH 75: CPT | Performed by: THORACIC SURGERY (CARDIOTHORACIC VASCULAR SURGERY)

## 2023-07-14 PROCEDURE — 83735 ASSAY OF MAGNESIUM: CPT

## 2023-07-14 PROCEDURE — 700111 HCHG RX REV CODE 636 W/ 250 OVERRIDE (IP): Performed by: HOSPITALIST

## 2023-07-14 PROCEDURE — 97163 PT EVAL HIGH COMPLEX 45 MIN: CPT

## 2023-07-14 PROCEDURE — 80048 BASIC METABOLIC PNL TOTAL CA: CPT

## 2023-07-14 PROCEDURE — 700102 HCHG RX REV CODE 250 W/ 637 OVERRIDE(OP): Performed by: INTERNAL MEDICINE

## 2023-07-14 PROCEDURE — 770022 HCHG ROOM/CARE - ICU (200)

## 2023-07-14 PROCEDURE — 85520 HEPARIN ASSAY: CPT

## 2023-07-14 PROCEDURE — 97535 SELF CARE MNGMENT TRAINING: CPT

## 2023-07-14 PROCEDURE — 85027 COMPLETE CBC AUTOMATED: CPT

## 2023-07-14 PROCEDURE — A9270 NON-COVERED ITEM OR SERVICE: HCPCS | Performed by: HOSPITALIST

## 2023-07-14 PROCEDURE — 99233 SBSQ HOSP IP/OBS HIGH 50: CPT | Mod: GC | Performed by: INTERNAL MEDICINE

## 2023-07-14 RX ORDER — METOPROLOL SUCCINATE 25 MG/1
25 TABLET, EXTENDED RELEASE ORAL
Status: DISCONTINUED | OUTPATIENT
Start: 2023-07-14 | End: 2023-07-17 | Stop reason: HOSPADM

## 2023-07-14 RX ADMIN — ASPIRIN 81 MG: 81 TABLET, COATED ORAL at 05:26

## 2023-07-14 RX ADMIN — METOPROLOL SUCCINATE 25 MG: 25 TABLET, EXTENDED RELEASE ORAL at 12:29

## 2023-07-14 RX ADMIN — SENNOSIDES AND DOCUSATE SODIUM 2 TABLET: 50; 8.6 TABLET ORAL at 17:30

## 2023-07-14 RX ADMIN — LISINOPRIL 5 MG: 5 TABLET ORAL at 05:26

## 2023-07-14 RX ADMIN — ACETAMINOPHEN 650 MG: 325 TABLET, FILM COATED ORAL at 17:29

## 2023-07-14 RX ADMIN — ROSUVASTATIN CALCIUM 20 MG: 10 TABLET, FILM COATED ORAL at 17:30

## 2023-07-14 RX ADMIN — LEVOTHYROXINE SODIUM 50 MCG: 0.05 TABLET ORAL at 17:29

## 2023-07-14 RX ADMIN — INSULIN HUMAN 3 UNITS: 100 INJECTION, SOLUTION PARENTERAL at 17:35

## 2023-07-14 RX ADMIN — HEPARIN SODIUM 12 UNITS/KG/HR: 5000 INJECTION, SOLUTION INTRAVENOUS at 23:40

## 2023-07-14 RX ADMIN — LEVOTHYROXINE SODIUM 50 MCG: 0.05 TABLET ORAL at 05:26

## 2023-07-14 ASSESSMENT — COGNITIVE AND FUNCTIONAL STATUS - GENERAL
TURNING FROM BACK TO SIDE WHILE IN FLAT BAD: A LOT
MOBILITY SCORE: 15
SUGGESTED CMS G CODE MODIFIER MOBILITY: CK
MOVING FROM LYING ON BACK TO SITTING ON SIDE OF FLAT BED: A LITTLE
STANDING UP FROM CHAIR USING ARMS: A LITTLE
CLIMB 3 TO 5 STEPS WITH RAILING: A LOT
WALKING IN HOSPITAL ROOM: A LITTLE
MOVING TO AND FROM BED TO CHAIR: A LOT

## 2023-07-14 ASSESSMENT — ENCOUNTER SYMPTOMS
DIZZINESS: 0
ORTHOPNEA: 0
PALPITATIONS: 0
SHORTNESS OF BREATH: 0
WEAKNESS: 0
COUGH: 0

## 2023-07-14 ASSESSMENT — PAIN DESCRIPTION - PAIN TYPE
TYPE: ACUTE PAIN

## 2023-07-14 ASSESSMENT — GAIT ASSESSMENTS
GAIT LEVEL OF ASSIST: MINIMAL ASSIST
DISTANCE (FEET): 5
ASSISTIVE DEVICE: FRONT WHEEL WALKER

## 2023-07-14 ASSESSMENT — FIBROSIS 4 INDEX: FIB4 SCORE: 3.38

## 2023-07-14 NOTE — CARE PLAN
The patient is Watcher - Medium risk of patient condition declining or worsening    Shift Goals  Clinical Goals: optimize bp, monitor labs, remains hemodynamically stable  Patient Goals: rest  Family Goals: keon    Progress made toward(s) clinical / shift goals:  HR 40-60s. NS to SB    Problem: Pain - Standard  Goal: Alleviation of pain or a reduction in pain to the patient’s comfort goal  7/14/2023 0715 by Walter Prince R.N.  Outcome: Progressing  7/14/2023 0647 by Walter Prince R.N.  Outcome: Progressing     Problem: Knowledge Deficit - Standard  Goal: Patient and family/care givers will demonstrate understanding of plan of care, disease process/condition, diagnostic tests and medications  7/14/2023 0715 by Walter Prince R.N.  Outcome: Progressing  7/14/2023 0647 by REMI Harry.ISIS.  Outcome: Progressing     Problem: Skin Integrity  Goal: Skin integrity is maintained or improved  7/14/2023 0715 by REMI Harry.N.  Outcome: Progressing  7/14/2023 0647 by REMI Harry.N.  Outcome: Progressing     Problem: Fall Risk  Goal: Patient will remain free from falls  7/14/2023 0715 by Walter Prince R.N.  Outcome: Progressing  7/14/2023 0648 by Walter Prince R.N.  Outcome: Progressing  7/14/2023 0647 by REMI Harry.N.  Outcome: Progressing     Problem: Hemodynamics  Goal: Patient's hemodynamics, fluid balance and neurologic status will be stable or improve  Outcome: Progressing     Problem: Respiratory  Goal: Patient will achieve/maintain optimum respiratory ventilation and gas exchange  Outcome: Progressing     Problem: Risk for Aspiration  Goal: Patient's risk for aspiration will be absent or decrease  Outcome: Progressing     Problem: Urinary - Renal Perfusion  Goal: Ability to achieve and maintain adequate renal perfusion and functioning will improve  Outcome: Progressing     Problem: Venous Thromboembolism (VTE) Prevention  Goal: The patient will remain free from venous thromboembolism (VTE)  Outcome: Progressing     Problem:  Nutrition  Goal: Patient's nutritional and fluid intake will be adequate or improve  Outcome: Progressing     Problem: Urinary Elimination  Goal: Establish and maintain regular urinary output  Outcome: Progressing     Problem: Bowel Elimination  Goal: Establish and maintain regular bowel function  Outcome: Progressing       Patient is not progressing towards the following goals:

## 2023-07-14 NOTE — ASSESSMENT & PLAN NOTE
Multivessel CAD on cardiac catheterization on 7/13  Not a candidate for cardiothoracic surgery  Query medical management versus PCI

## 2023-07-14 NOTE — PROGRESS NOTES
Patient returned from cath lab, bedside report received. TR band @13, air t be released after 1 hour per order. VSS. Patient not reporting pain at this time, VSS. Family brought to bedside.

## 2023-07-14 NOTE — PROGRESS NOTES
Cardiology Follow-up Consult Note  Date of note:    7/14/2023          Patient ID:  Name:   Nancy King   YOB: 1935  Age:   87 y.o.  female   MRN:   [id number]    Chief Complaint   Patient presents with    Chest Pain     That started at 3 am associated with nausea  A referral from [facility]  Pt has Afib on RVR     Treatment given:    - Adenosine 6 mg the 12 mg IV  - Metoprolol 15 mg IV  - Normal Saline 1 Liter IV  - Diltiazem 10 mg IV bolus then 5 mg/ hour infusion  - Nitroglycerine 1 tab SL  - Aspirin 324 mg tab PO    Alert and Oriented x 4         ID/HPI:   87 year old woman with PMH CAD, recurrent PE with IVC filter placement in 2015 on chronic warfarin therapy, infiltrating ductal carcinoma of the right breast, HTN, HLD, DM2 presents with angina found nstemi and new atrial flutter. Previously found functional testing with apical ischemia, had ongoing angina and LE edema for few months.     SILVA DCCV 7/12/23:  Successful SILVA-cardioversion of atrial flutter to sinus rhythm    TTE 7/12/23:  Left ventricular systolic function is normal.  Calcified aortic valve leaflets with moderately reduced motion.  Mild-Moderate aortic valve stenosis.    [facility] 7/13:   HEMODYNAMICS:  Aortic pressure: 117 /50/78 mmHg  LVEDP: 11 mmHg  Peak-Peak gradient across AV 20mmHg (mild-moderate AS)     CORONARY ANGIOGRAPHY:  The left main coronary artery : Large-caliber vessel that bifurcates to LAD and left circumflex  The left anterior descending coronary artery : Large in caliber vessel with subtotal occlusion in its mid segment after takeoff of 2 diagonal branches.  Unsure if transapical vessel, with CARLINE 1 flow.  The diagonal branches appear to be medium-large in caliber with severe ostial/proximal 80-90% stenoses in both.  The left circumflex coronary artery : Large-caliber vessel that gives rise to a high OM1 that appears to be large in caliber with diffuse proximal/mid 80-90% disease.  The circumflex has a diffuse  segment of calcified 70-80% tubular stenosis throughout its midportion before gives rise to 2 OM branches.  Large OM 2 with severe ostial 90% stenosis.  Medium-large in caliber OM 3 with severe 90% proximal/mid stenosis.  The right coronary artery  : Large in caliber calcified vessel with diffuse nonobstructive CAD.     IMPRESSION:  1.  Severe multivessel calcified CAD including subtotally occluded mid LAD, not very optimal for PCI given the anatomy of the severe stenoses and calcifications.  2.  Normal resting LVEDP 11 mmHg   3.  Mild-moderate AS    Interim Events:  7/14:  VS with BP 150s/70s, HR 50s. Patient reports chest pain has resolved since yesterday afternoon, no SOB/dizziness or lightheadedness. Monitory with SR, min HR 50 average 70s, no ectopy.    ROS  No NV, No Bleeding, No dizziness   All other review of systems reviewed and negative.    Past medical, surgical, social, and family history reviewed and unchanged from admission except as noted in assessment and plan.    Medications: Reviewed in MAR  Current Facility-Administered Medications   Medication Dose Frequency Provider Last Rate Last Admin    aspirin EC tablet 81 mg  81 mg DAILY Davi Stevens M.D.   81 mg at 07/14/23 0526    lisinopril (Prinivil) tablet 5 mg  5 mg Q DAY Lexx Ramos M.D.   5 mg at 07/14/23 0526    rosuvastatin (Crestor) tablet 20 mg  20 mg Q EVENING Lexx Ramos M.D.   20 mg at 07/13/23 1703    heparin infusion 25,000 units in 500 mL 0.45% NACL  0-30 Units/kg/hr (Adjusted) Continuous LANDON Mcnamara.OSha 16.3 mL/hr at 07/14/23 0323 12 Units/kg/hr at 07/14/23 0323    heparin injection 2,000 Units  30 Units/kg (Adjusted) PRN Truman Magdaleno D.O.        levothyroxine (Synthroid) tablet 50 mcg  50 mcg BID LANDON Mcnamara.O.   50 mcg at 07/14/23 0526    senna-docusate (Pericolace Or Senokot S) 8.6-50 MG per tablet 2 Tablet  2 Tablet BID LANDON Mcnamara.O.   2 Tablet at 07/13/23 9005     "And    polyethylene glycol/lytes (Miralax) PACKET 1 Packet  1 Packet QDAY PRN Truman Magdaleno D.O.        And    magnesium hydroxide (Milk Of Magnesia) suspension 30 mL  30 mL QDAY PRN Truman Magdaleno D.O.        And    bisacodyl (Dulcolax) suppository 10 mg  10 mg QDAY PRN ALEX McnamaraOSha        Respiratory Therapy Consult   Continuous RT Truman Magdaleno D.O.        acetaminophen (Tylenol) tablet 650 mg  650 mg Q6HRS PRN LANDON Mcnamara.OSha        metoprolol tartrate (Lopressor) tablet 25 mg  25 mg TWICE DAILY ALEX McnamaraOSha   25 mg at 07/13/23 1703    ondansetron (Zofran) syringe/vial injection 4 mg  4 mg Q4HRS PRN ALEX McnamaraOSha        ondansetron (Zofran ODT) dispertab 4 mg  4 mg Q4HRS PRN LANDON Mcnamara.O.   4 mg at 07/12/23 1629    insulin regular (HumuLIN R,NovoLIN R) injection  3-18 Units 4X/DAY ACHS ALEX McnamaraOSha   3 Units at 07/13/23 1703    nitroglycerin 50 mg in D5W 250 ml infusion  0-200 mcg/min Continuous KATRINA ChaneyP.R.N. 12 mL/hr at 07/14/23 0549 40 mcg/min at 07/14/23 0549    MD Alert...ICU Electrolyte Replacement per Pharmacy   PHARMACY TO DOSE Davi Stevens M.D.       Last reviewed on 7/12/2023  8:55 AM by Little Zelaya, T   Allergies   Allergen Reactions    Sulfa Drugs      Swelling    Levofloxacin Rash     Rash         Physical Exam  Body mass index is 28.54 kg/m². BP (!) 144/64   Pulse (!) 53   Temp 35.9 °C (96.7 °F) (Temporal)   Resp 16   Ht 1.651 m (5' 5\")   Wt 77.8 kg (171 lb 8.3 oz)   SpO2 98%    Vitals:    07/14/23 0600 07/14/23 0615 07/14/23 0630 07/14/23 0645   BP: (!) 157/69 (!) 148/65 (!) 160/70 (!) 144/64   Pulse: (!) 49 67 (!) 49 (!) 53   Resp:       Temp:       TempSrc:       SpO2: 98% 98% 98% 98%   Weight:       Height:        Oxygen Therapy:  Pulse Oximetry: 98 %, O2 (LPM): 1, O2 Delivery Device: Silicone Nasal Cannula    General: no apparent distress  Eyes: " nl conjunctiva  ENT: OP clear  Neck: no JVD   Lungs: normal respiratory effort, CTAB, trace bibasilar crackles.   Heart: RRR, 3/6 harsh systolic murmur, no rubs or gallops,   EXT No edema bilateral lower extremities but tenderness to palpation of calf bilaterally. + pedal pulses. no cyanosis  Abdomen: soft, non tender, non distended.  Neurological: No focal deficits  Psychiatric: Appropriate affect.  Skin: Warm extremities    Labs (personally reviewed and notable for):   Recent Results (from the past 24 hour(s))   EKG    Collection Time: 23 10:16 AM   Result Value Ref Range    Report       [facility]    Test Date:  2023  Pt Name:    URSULA MARTÍNEZ                Department: 161  MRN:        [id number]                      Room:       T613  Gender:     Female                       Technician: [facility]  :        [id number]                   Requested By:SIM MENESES  Order #:    [id number]                    Reading MD: Lawrence House MD    Measurements  Intervals                                Axis  Rate:       60                           P:          58  ID:         [id number]                          QRS:        12  QRSD:       96                           T:          39  QT:         468  QTc:        468    Interpretive Statements  Sinus rhythm  Probable left atrial enlargement  Left ventricular hypertrophy  Prolonged QTc  Electronically Signed On 2023 12:11:34 PDT by Lawrence House MD     POCT glucose device results    Collection Time: 23 12:50 PM   Result Value Ref Range    POC Glucose, Blood 151 (H) 65 - 99 mg/dL   POCT activated clotting time device results    Collection Time: 23  3:59 PM   Result Value Ref Range    Istat Activated Clotting Time 173 (H) 74 - 137 sec   POCT glucose device results    Collection Time: 23  4:51 PM   Result Value Ref Range    POC Glucose, Blood 128 (H) 65 - 99 mg/dL   POCT glucose device results    Collection Time: 23  7:47 PM   Result Value  Ref Range    POC Glucose, Blood 117 (H) 65 - 99 mg/dL   Basic Metabolic Panel (BMP)    Collection Time: 07/14/23  1:45 AM   Result Value Ref Range    Sodium 134 (L) 135 - 145 mmol/L    Potassium 4.5 3.6 - 5.5 mmol/L    Chloride 101 96 - 112 mmol/L    Co2 21 20 - 33 mmol/L    Glucose 161 (H) 65 - 99 mg/dL    Bun 17 8 - 22 mg/dL    Creatinine 0.67 0.50 - 1.40 mg/dL    Calcium 8.9 8.5 - 10.5 mg/dL    Anion Gap 12.0 7.0 - 16.0   CBC without Differential    Collection Time: 07/14/23  1:45 AM   Result Value Ref Range    WBC 7.9 4.8 - 10.8 K/uL    RBC 3.90 (L) 4.20 - 5.40 M/uL    Hemoglobin 11.4 (L) 12.0 - 16.0 g/dL    Hematocrit 35.0 (L) 37.0 - 47.0 %    MCV 89.7 81.4 - 97.8 fL    MCH 29.2 27.0 - 33.0 pg    MCHC 32.6 32.2 - 35.5 g/dL    RDW 48.5 35.9 - 50.0 fL    Platelet Count 208 164 - 446 K/uL    MPV 10.3 9.0 - 12.9 fL   MAGNESIUM    Collection Time: 07/14/23  1:45 AM   Result Value Ref Range    Magnesium 2.1 1.5 - 2.5 mg/dL   PHOSPHORUS    Collection Time: 07/14/23  1:45 AM   Result Value Ref Range    Phosphorus 3.1 2.5 - 4.5 mg/dL   Heparin Xa (Unfractionated)    Collection Time: 07/14/23  1:45 AM   Result Value Ref Range    Heparin Xa (UFH) 0.34 IU/mL   ESTIMATED GFR    Collection Time: 07/14/23  1:45 AM   Result Value Ref Range    GFR (CKD-EPI) 85 >60 mL/min/1.73 m 2   POCT glucose device results    Collection Time: 07/14/23  6:21 AM   Result Value Ref Range    POC Glucose, Blood 103 (H) 65 - 99 mg/dL       Cardiac Imaging and Procedures Review:    EKG and telemetry tracings personally reviewed    Impression and Medical Decision Making:  Principal Problem:    Atrial flutter with rapid ventricular response (HCC) (POA: Yes)  Active Problems:    Hypertensive emergency (POA: Unknown)    Primary hypertension (POA: Yes)    CAD (coronary artery disease) (Chronic) (POA: Yes)    History of recurrent pulmonary embolism (POA: Yes)    Dyslipidemia (POA: Yes)    NSTEMI (non-ST elevated myocardial infarction) (HCC) (POA:  Unknown)    Urinary incontinence (POA: Unknown)    Type 2 diabetes mellitus (HCC) (POA: Unknown)    S/P IVC filter (POA: Unknown)    Chronic anticoagulation (Chronic) (POA: Yes)    Breast cancer (HCC) (POA: Yes)  Resolved Problems:    * No resolved hospital problems. *    A/P:  86 yo female admitted with NSTEMI and new atrial flutter with RVR. S/p successful cardioversion and DCCV, [facility] 7/13 revealing multivessel obstructive CAD (LAD, LCx, Oms), no PCI performed. Also has Moderate AS. Patient appears euvolemic and chest pain free.  - Cardiac surgery was consulted for evaluation for CABG    - Continue aspirin, statin, heparin gtt  - Continue lisinopril   - Agree with switch to metoprolol succinate at 25 mg   - Telemetry, I/Os     It is our pleasure to participate in the care of Ms. King.  Please do not hesitate to contact us with questions or concerns.    Gladis Warner MD, IM PGY2

## 2023-07-14 NOTE — DIETARY
"Nutrition Services: Day 2 of admit.  Nancy King is a 87 y.o. female with admitting DX of Atrial fibrillation with rapid ventricular response (HCC).    Consult received for Cardiac Rehab Diet Education.    Per [facility] 7/13: \"I recommend either surgical consultation for CABG or optimal medical therapy rather than PCI given her anatomy.\"  Pt now pending CTS consult for discussion of possible OHS.  Lipid panel WNL. HA1c 6.8% (last was 7.1% in 2015).    Per RD judgment, no strong indication for cardiac diet education for this pt @ this time.   RN note mentioned that pt has had a poor appetite and advised adding nutrition supplements-- RD will order.    RD available as further indicated.  "

## 2023-07-14 NOTE — PROGRESS NOTES
Family at bedside and updated by cardiology, awaiting cardiothoracic surgery team for discussion and decision about open heart surgery.  Questions answered regarding plan of care and emotional support provided to family.  Patient with poor appetite recently, will add supplements to diet trays to assist with nutrition per family request.  and daughter at bedside currently, daughter, Chrissie, would like to be called if any discussion about plan of care is done at bedside and she is not present; phone number is on the whiteboard.

## 2023-07-14 NOTE — THERAPY
Occupational Therapy Contact Note    Patient Name: Nancy King  Age:  87 y.o., Sex:  female  Medical Record #: [id number]  Today's Date: 7/14/2023 07/14/23 1117   Interdisciplinary Plan of Care Collaboration   Collaboration Comments OT orders received. Per notes, pt awaiting cardiac sx consult. Will hold OT eval until definitive POC is established.

## 2023-07-14 NOTE — DISCHARGE PLANNING
CM visited with patient and family at the bedside to discuss dcp. Patient lives in [place] in a 2-story house with her  Will. They share 1 cell phone [phone]. She has a walker and wheelchair at home that she owns, her  is having a chair lift installed in the home to give the patient access to the second floor of the home. The  and family are anticipating SNF admission post discharge. Choice list for SNFs in [facility] given since daughter lives in [place] and list for [place], [place], and [place].  would rather look at facilities local to [facility] than to sent the patient to [facility] where they live. The family will review the lists and CM will follow up with them. PAS [id number] completed and ready for SNF when needed.     Case Management Discharge Planning    Admission Date: 7/12/2023  GMLOS: 2.4  ALOS: 2    6-Clicks ADL Score: 17  6-Clicks Mobility Score: 11  PT and/or OT Eval ordered: Yes  Post-acute Referrals Ordered: No  Post-acute Choice Obtained: No  Has referral(s) been sent to post-acute provider:  No      Anticipated Discharge Dispo: Discharge Disposition: D/T to SNF with Medicare cert in anticipation of skilled care (03)  Discharge Contact Phone Number: [phone]    DME Needed: No    Action(s) Taken: Updated Provider/Nurse on Discharge Plan, Patient Conference, DC Assessment Complete (See below), Family Conference, and Completed PASSR/LOC    Escalations Completed: None    Medically Clear: No    Barriers to Discharge: Medical clearance    Care Transition Team Assessment    Information Source  Orientation Level: Oriented X4  Information Given By: Patient  Informant's Name: Nancy  Who is responsible for making decisions for patient? : Patient    Readmission Evaluation  Is this a readmission?: No    Elopement Risk  Legal Hold: No  Ambulatory or Self Mobile in Wheelchair: No-Not an Elopement Risk  Elopement Risk: Not at Risk for Elopement    Interdisciplinary  Discharge Planning  Does Admitting Nurse Feel This Could be a Complex Discharge?: No  Primary Care Physician: Dee Slater  Lives with - Patient's Self Care Capacity: Significant Other  Patient or legal guardian wants to designate a caregiver: No  Support Systems: Family Member(s), Spouse / Significant Other  Housing / Facility: 2 Story House  Do You Take your Prescribed Medications Regularly: Yes  Able to Return to Previous ADL's: Future Time w/Therapy  Mobility Issues: Yes  Prior Services: None  Patient Prefers to be Discharged to:: skilled nursing  Assistance Needed: Yes  Durable Medical Equipment: Walker (chair lift on order)    Discharge Preparedness  What is your plan after discharge?: Uncertain - pending medical team collaboration  What are your discharge supports?: Spouse  Prior Functional Level: Ambulatory, Independent with Activities of Daily Living    Functional Assesment  Prior Functional Level: Ambulatory, Independent with Activities of Daily Living    Finances  Financial Barriers to Discharge: No  Prescription Coverage: Yes    Vision / Hearing Impairment  Vision Impairment : Yes  Right Eye Vision: Wears Glasses  Left Eye Vision: Wears Glasses  Hearing Impairment : No    Values / Beliefs / Concerns  Values / Beliefs Concerns : No    Advance Directive  Advance Directive?: None    Domestic Abuse  Have you ever been the victim of abuse or violence?: No  Physical Abuse or Sexual Abuse: No  Verbal Abuse or Emotional Abuse: No  Possible Abuse/Neglect Reported to:: Not Applicable    Psychological Assessment  History of Substance Abuse: None  History of Psychiatric Problems: No  Non-compliant with Treatment: No  Newly Diagnosed Illness: No         Anticipated Discharge Information  Discharge Disposition: D/T to SNF with Medicare cert in anticipation of skilled care (03)  Discharge Contact Phone Number: [phone]

## 2023-07-14 NOTE — PROGRESS NOTES
"[facility] Resident Progress Note      Admit Date: 7/12/2023    Resident(s): Rand Russell M.D.   Attending:  ZACH LANDRY/ Dr. Stevens    Patient ID:    Name:  Nancy King   YOB: 1935  Age:  87 y.o.  female   MRN:  [id number]    Hospital Course (carried forward and updated):  Nancy King is an 87 y.o. female with \"a history of DM type II, CAD, recurrent PE with IVC filter placement in 2015 on chronic warfarin therapy, infiltrating ductal carcinoma of the right breast with prior partial mastectomy and adjuvant radiation therapy, dyslipidemia, mild aortic stenosis and hypothyroidism.  She has been experiencing increasing angina for the last several months.  For the last month she has had increasing lower extremity edema.  She presented to an outside hospital with these complaints and was found to be in atrial flutter with rapid ventricular response.  She was transferred to [facility] for subspecialty care.  With a diltiazem drip her heart rate [was] controlled and her chest pressure [had] resolved.\" - Dr. Stevens     She underwent SILVA (no thrombus) with DC cardioversion for the atrial flutter and was restored to sinus rhythm.  She was taken to the cath lab and findings were significant for \"severe multivessel calcified CAD including subtotally occluded mid LAD, not very optimal for PCI given the anatomy of the severe stenoses and calcifications.\"     Consultants:  Critical Care  Cardiology / Interventional Cardiology     Interval Events:  \"Severe multivessel calcified CAD including subtotally occluded mid LAD, not very optimal for PCI given the anatomy of the severe stenoses and calcifications\" found in PCI yesterday, so CTS was consulted for CABG evaluation. She feels well and has had no chest pain/pressure or other uncomfortable symptoms    NEURO:   Fully alert and oriented, awake  CARDIOVASC:  BP 100s-150s/50s-80s with pulse 40s-60s, has maintained SR  RESPIRATORY:  O2 high 90s on " 1L NC  GI/NUTRITION:  Cardiac diet  RENAL/FLUID/LYTES: Creatinine 0.67, no IVF, K 4.5, mag 2.1  HEME/ONC:   Hgb 11.4 (stable)  INFECTIOUS D:  N/a  ENDOCRINE:   N/a    Vitals Range last 24h:  Temp:  [35.9 °C (96.6 °F)-36.7 °C (98 °F)] 36.7 °C (98 °F)  Pulse:  [47-75] 73  Resp:  [14-56] 16  BP: ()/(49-98) 145/66  SpO2:  [88 %-100 %] 94 %      Intake/Output Summary (Last 24 hours) at 7/14/2023 1140  Last data filed at 7/14/2023 0800  Gross per 24 hour   Intake 925.2 ml   Output 101 ml   Net 824.2 ml          Review of Systems   Respiratory:  Negative for cough and shortness of breath.    Cardiovascular:  Negative for chest pain, palpitations, orthopnea and leg swelling.   Neurological:  Negative for dizziness and weakness.       PHYSICAL EXAM:  Vitals:    07/14/23 0645 07/14/23 0800 07/14/23 0900 07/14/23 1000   BP: (!) 144/64 (!) 147/68 (!) 142/98 (!) 145/66   Pulse: (!) 53 63 68 73   Resp:       Temp:  36.7 °C (98 °F)     TempSrc:  Temporal     SpO2: 98% 95% 97% 94%   Weight:       Height:        Body mass index is 28.54 kg/m².    O2 therapy: Pulse Oximetry: 94 %, O2 (LPM): 1, O2 Delivery Device: Silicone Nasal Cannula    Date 07/14/23 0700 - 07/15/23 0659   Shift 1924-6211 [id number] 5885-3419 24 Hour Total   INTAKE   P.O. 100   100     P.O. 100   100   I.V. 339.8   339.8     Nitroglycerin Volume 112.1   112.1     Heparin Volume 227.7   227.7   Shift Total 439.8   439.8   OUTPUT   Stool         Number of Times Stooled 0 x   0 x   Shift Total       .8   439.8        Physical Exam  Vitals and nursing note reviewed.   Constitutional:       General: She is not in acute distress.     Appearance: Normal appearance. She is not ill-appearing, toxic-appearing or diaphoretic.   HENT:      Head: Normocephalic and atraumatic.      Mouth/Throat:      Mouth: Mucous membranes are moist.      Pharynx: Oropharynx is clear.   Eyes:      Extraocular Movements: Extraocular movements intact.   Cardiovascular:      Rate and  Rhythm: Normal rate and regular rhythm.      Heart sounds: Murmur (systolic) heard.   Pulmonary:      Effort: Pulmonary effort is normal. No respiratory distress.      Breath sounds: Normal breath sounds. No wheezing or rales.   Abdominal:      General: Abdomen is flat. Bowel sounds are normal.      Palpations: Abdomen is soft.      Tenderness: There is no abdominal tenderness.   Musculoskeletal:      Right lower leg: No edema.      Left lower leg: No edema.   Skin:     General: Skin is warm and dry.   Neurological:      General: No focal deficit present.      Mental Status: She is alert and oriented to person, place, and time.   Psychiatric:         Mood and Affect: Mood normal.         Behavior: Behavior normal.             Recent Labs     07/12/23  0650 07/12/23  1214 07/13/23 0142 07/14/23 0145   SODIUM 138  --  140 134*   POTASSIUM 4.3  --  3.7 4.5   CHLORIDE 103  --  103 101   CO2 20  --  25 21   BUN 11  --  13 17   CREATININE 0.74  --  0.72 0.67   MAGNESIUM  --  1.6 2.4 2.1   PHOSPHORUS  --   --  4.0 3.1   CALCIUM 9.3  --  8.4* 8.9       Recent Labs     07/12/23  0650 07/13/23 0142 07/14/23 0145   ALTSGPT 15  --   --    ASTSGOT 32  --   --    ALKPHOSPHAT 143*  --   --    TBILIRUBIN 0.6  --   --    GLUCOSE 199* 107* 161*       Recent Labs     07/12/23  0650 07/12/23  1214 07/13/23 0142 07/14/23 0145   RBC 4.53  --  3.94* 3.90*   HEMOGLOBIN 13.5  --  11.7* 11.4*   HEMATOCRIT 40.6  --  35.7* 35.0*   PLATELETCT 247  --  226 208   PROTHROMBTM 18.5* 18.0*  --   --    APTT 27.0 116.3*  --   --    INR 1.57* 1.52*  --   --        Recent Labs     07/12/23 0650 07/13/23 0142 07/14/23 0145   WBC 9.5 10.5 7.9   NEUTSPOLYS 84.40*  --   --    LYMPHOCYTES 8.00*  --   --    MONOCYTES 5.80  --   --    EOSINOPHILS 0.20  --   --    BASOPHILS 0.40  --   --    ASTSGOT 32  --   --    ALTSGPT 15  --   --    ALKPHOSPHAT 143*  --   --    TBILIRUBIN 0.6  --   --          Meds:   metoprolol SR  25 mg      aspirin  81 mg       lisinopril  5 mg      rosuvastatin  20 mg      heparin  0-30 Units/kg/hr (Adjusted) 12 Units/kg/hr (07/14/23 0323)    heparin  30 Units/kg (Adjusted)      levothyroxine  50 mcg      senna-docusate  2 Tablet      And    polyethylene glycol/lytes  1 Packet      And    magnesium hydroxide  30 mL      And    bisacodyl  10 mg      Respiratory Therapy Consult        acetaminophen  650 mg      ondansetron  4 mg      ondansetron  4 mg      insulin regular  3-18 Units      nitroglycerin in D5W  0-200 mcg/min 50 mcg/min (07/14/23 0909)    MD Alert...Adult ICU Electrolyte Replacement per Pharmacy            Procedures:  7/13/23-  SILVA with DC cardioversion     Imaging:  EC-ECHOCARDIOGRAM COMPLETE W/O CONT   Final Result      EC-SILVA W/O CONT         CT-CTA CHEST PULMONARY ARTERY W/ RECONS   Final Result      1.  No evidence of pulmonary embolism.   2.  Mild interstitial pulmonary edema.   3.  Atherosclerosis including coronary artery disease.            CL-CARDIOVERSION    (Results Pending)   CL-LEFT HEART CATHETERIZATION WITH POSSIBLE INTERVENTION    (Results Pending)       ASSESSEMENT and PLAN:  *NSTEMI (non-ST elevated myocardial infarction) (HCC)  Severe multivessel CAD  Assessment & Plan  Prior history of moderate apical ischemia as seen on NM stress test, LHC not done previously as recommended. EKG this admission with ST depression in V3 and V4. Troponin on presentation in the 200s -> 600s. Symptomatic with exertion. LHC with subtotally occluded mid LAD, severe multivessel calcified CAD that would be better treated with CABG    -Cardiothoracic surgery consulted  -Heparin drip  -ASA  -High-intensity statin  -Metoprolol and lisinopril started     Atrial flutter with rapid ventricular response (HCC)- (present on admission)  Assessment & Plan  New diagnosis for the patient, symptomatic. S/p DC cardioversion 7/12 with restoration to sinus rhythm. TSH WNL    -Metoprolol succinate started  -Had been on warfarin however  transitioned to heparin drip acutely   -Plan DOAC on discharge  -Monitor and replace electrolytes  -Cardiology involved  -Telemetry monitoring for reversion back into aflutter      Primary hypertension- (present on admission)  Assessment & Plan        Maintained on metoprolol 100 mg as an outpatient, however patient has NSTEMI and goal of lower pressures inpatient     -Nitroglycerin gtt to maintain SBP <150  -Lisinopril (and metoprolol) started   -Monitor closely and titrate up GDMT post-cath and dependent upon her vital signs    Aortic stenosis, mild-moderate  Assessment & Plan  Systolic ejection murmur on exam with mild aortic stenosis noted on 06/2022 echocardiogram (AV area 1 cm2). Repeat echo with valvular area 0.88 cm2, mean gradient 13.3, peak velocity 2.5, LVEF 55. Patient reports she does not necessarily get dizzy or SOB with exertion, though occasionally when standing she will fall    -Outpatient monitoring for progression of stenosis    History of recurrent pulmonary embolism- (present on admission)  Assessment & Plan  Distant history of  Patient has an IVC filter in place, and is maintained on Coumadin.    -Coumadin will be held, on heparin drip  -Would probably best be managed with a DOAC discharge    S/P IVC filter  Assessment & Plan  Placed several years ago for history of DVT and PE  She is on full dose anticoagulation with warfarin, will likely transition to DOAC    -We will discuss with radiology as regards advisability of removal though this is not emergent so probably best done as an outpatient in order to minimize cardiac risk    Type 2 diabetes mellitus (HCC)  Assessment & Plan  Patient is maintained on sitagliptin. A1c 6.8.    -Cover sliding scale initially on admission  -Would be a good candidate for an SGLT2 on discharge    Urinary incontinence  Assessment & Plan  Chronic since she had her children    -Monitor UOP while inpatient        DISPO: remain in ICU pending cardiothoracic surgery  consultation for CABG    CODE STATUS: FULL    Quality Measures:  Feeding: cardiac diet   Analgesia: n/a  Sedation: n/a  Thromboprophylaxis: therapeutic heparin  Head of bed: >30 degrees  Ulcer prophylaxis: n/a  Glycemic control: Correctional: SSI regular  Bowel care: bowel regimen  Indwelling lines: PIV  Deescalation of antibiotics: n/a      Rand Russell M.D. PGY-2

## 2023-07-14 NOTE — THERAPY
Physical Therapy   Initial Evaluation     Patient Name: Nancy King  Age:  87 y.o., Sex:  female  Medical Record #: [id number]  Today's Date: 7/14/2023     Precautions  Precautions: Fall Risk;Cardiac Precautions (See Comments)  Comments: Keep systolic BP below 150. pt is also limited by fear of falling    Assessment  Patient is 87 y.o. female admitted with CP, with previous medical history that includes type 2 diabetes, distant history of breast cancer, distant history of DVT/PE with IVC in place, dyslipidemia, hypertension hypothyroidism, on chronic anticoagulation with warfarin.  Of note the patient had an abnormal stress test about a year ago, and cardiac catheterization was recommended however she never presented to get this done.        Cath done 7/13 with findings for Severe multivessel calcified CAD, surgery consult pending. Pt is seen today at request of nsg and family to assess pt's ability to be OOB to chair, as family would like pt to be able to visit with new grandchild when brought to visit in lounge on floor. Daughter reports concern that pt may choose not to have surgery.     Today, pt struggled with sequencing, and with answering questions of PLF. Pt struggled most with OOB, but spouse reports just having bed delivered with mechanical HOB that he hopes will help at home. Pt was able to ambulate short distance to chair with no instability and HR went from 61 at rest to 84 with sitting up in chair. Pt lives with spouse who may be limited with physical assist he can offer. Adult son lives close by. PT to follow once surgical plan is clear. Pt to be up to chair for meals with nsg as tolerated.           Plan    Physical Therapy Initial Treatment Plan   Treatment Plan : Bed Mobility, Gait Training, Neuro Re-Education / Balance, Stair Training, Therapeutic Activities, Therapeutic Exercise  Treatment Frequency: 3 Times per Week  Duration: Until Therapy Goals Met    DC Equipment Recommendations: Unable to  determine at this time  Discharge Recommendations: Recommend post-acute placement for additional physical therapy services prior to discharge home (depending on plan for possible surgery.)            Objective       07/14/23 1513   Charge Group   PT Evaluation PT Evaluation High   PT Self Care / Home Evaluation (Units) 1   Total Time Spent   PT Total Time Yes   PT Evaluation Time Spent (Mins) 45   PT Self Care/Home Evaluation Time Spent (Mins) 15   PT Total Time Spent (Calculated) 60   Initial Contact Note    Initial Contact Note Order Received and Verified, Physical Therapy Evaluation in Progress with Full Report to Follow.   Precautions   Precautions Fall Risk;Cardiac Precautions (See Comments)   Comments Keep systolic BP below 150. pt is also limited by fear of falling   Vitals   Pulse 61  (77 sitting EOB, 84 up in chair after 5 foot walk.)   Patient BP Position Supine   Blood Pressure  (!) 169/76  (up chair, nsg updated, will adjust meds to bring systolic down.)   O2 (LPM) 1   O2 Delivery Device Silicone Nasal Cannula   Pain 0 - 10 Group   Therapist Pain Assessment During Activity;Nurse Notified;0   Prior Living Situation   Prior Services None   Housing / Facility 2 Story House   Steps Into Home 3   Steps In Home   (flight)   Rail Right Rail (Steps into Home);Right Rail  (Steps in Home)   Equipment Owned Front-Wheel Walker;Wheelchair  (spouse has ordered a chair lift for flight stairs and lift for 3 steps into home, just got mechanical HOB.)   Lives with - Patient's Self Care Capacity Spouse  (uses a FWW himself. Supportive, but limited physical assist)   Comments pt had been sleeping on couch until chair lift is installed on flight stairs at home.   Prior Level of Functional Mobility   Bed Mobility Independent   Transfer Status Independent   Ambulation Independent   Ambulation Distance household, limited   Assistive Devices Used Front-Wheel Walker   Stairs Required Assist   Cognition    Cognition /  Consciousness X   Speech/ Communication Delayed Responses   Level of Consciousness   (awake but struggles to follow)   Ability To Follow Commands 1 Step  (inconsistent)   New Learning Impaired   Attention Impaired   Sequencing Impaired   Active ROM Lower Body    Active ROM Lower Body  X   Comments struggles to keep knees flexed in bed despite multiple cues, but functional AROM once up and moving.   Strength Lower Body   Lower Body Strength  X   Gross Strength Generalized Weakness, Equal Bilaterally   Balance Assessment   Sitting Balance (Static) Poor  (R lean initially at EOB, better once bed flat, does not follow well to adjust posture)   Sitting Balance (Dynamic) Poor   Standing Balance (Static) Poor +   Standing Balance (Dynamic) Poor +   Weight Shift Sitting Poor   Weight Shift Standing Fair   Comments with FWW   Bed Mobility    Supine to Sit Moderate Assist  (struggled on her own, unable, but also struggled to follow sequencing for log roll. Needed HOB up and used rail with mod A.)   Sit to Supine   (up chair)   Scooting Minimal Assist  (fearful)   Rolling Moderate Assist to Rt.   Comments fearful of rolling, ed done, sequencing explained.   Gait Analysis   Gait Level Of Assist Minimal Assist   Assistive Device Front Wheel Walker   Distance (Feet) 5   # of Times Distance was Traveled 1   Deviation   (flexed posture)   # of Stairs Climbed 0   Weight Bearing Status no restrictions   Functional Mobility   Sit to Stand Minimal Assist   Bed, Chair, Wheelchair Transfer Minimal Assist   Transfer Method Stand Pivot   How much difficulty does the patient currently have...   Turning over in bed (including adjusting bedclothes, sheets and blankets)? 2   Sitting down on and standing up from a chair with arms (e.g., wheelchair, bedside commode, etc.) 3   Moving from lying on back to sitting on the side of the bed? 2   How much help from another person does the patient currently need...   Moving to and from a bed to a chair  (including a wheelchair)? 3   Need to walk in a hospital room? 3   Climbing 3-5 steps with a railing? 2   6 clicks Mobility Score 15   Activity Tolerance   Sitting in Chair 10+   Standing 5   Short Term Goals    Short Term Goal # 1 Pt will perform bed mobility with supervision in 6 visits to improve functional indep.   Short Term Goal # 2 Pt will transfer with supervision in 6 visits to imrpove funcitonal indep   Short Term Goal # 3 Pt will ambulate x 75 feet using fWW with cg assist in 6 viists to improve functiona indep.   Short Term Goal # 4 remainder of goals depend on plan re possible CABG surgery.   Education Group   Education Provided Role of Physical Therapist   Role of Physical Therapist Patient Response Patient;Family;Acceptance;Explanation;Verbal Demonstration   Additional Comments education done re benefit of being OOB for meals, getting up with nsg as tolerated.   Physical Therapy Initial Treatment Plan    Treatment Plan  Bed Mobility;Gait Training;Neuro Re-Education / Balance;Stair Training;Therapeutic Activities;Therapeutic Exercise   Treatment Frequency 3 Times per Week   Duration Until Therapy Goals Met   Problem List    Problems Impaired Bed Mobility;Impaired Transfers;Impaired Ambulation;Functional ROM Deficit;Impaired Balance;Decreased Activity Tolerance;Motor Planning / Sequencing   Anticipated Discharge Equipment and Recommendations   DC Equipment Recommendations Unable to determine at this time   Discharge Recommendations Recommend post-acute placement for additional physical therapy services prior to discharge home  (depending on plan for possible surgery.)   Interdisciplinary Plan of Care Collaboration   IDT Collaboration with  Nursing   Patient Position at End of Therapy Seated;Call Light within Reach;Tray Table within Reach;Phone within Reach;Family / Friend in Room   Collaboration Comments riley updated   Session Information   Date / Session Number  7/14-1 (1/3, 7/20)

## 2023-07-14 NOTE — CARE PLAN
The patient is Stable - Low risk of patient condition declining or worsening    Shift Goals  Clinical Goals: Increase po intake, increase activity  Patient Goals: Decide on plan of care  Family Goals: Discussion with physicians about plan of care    Progress made toward(s) clinical / shift goals:  Patient out of bed to chair with PT today.    Patient is not progressing towards the following goals: Awaiting cardiac surgery team for consultation.      Problem: Knowledge Deficit - Standard  Goal: Patient and family/care givers will demonstrate understanding of plan of care, disease process/condition, diagnostic tests and medications  Outcome: Progressing:  Patient and family updated on plan of care, still waiting for cardiac surgery consultation to determine if surgery is a treatment option.      Problem: Hemodynamics  Goal: Patient's hemodynamics, fluid balance and neurologic status will be stable or improve  Outcome: Progressing:  Systolic blood pressure variable today ranging from 106 to 162 after activity.  Nitroglycerin drip titrated per order to maintain goal of less than 150 SBP.  Pt started on Metoprolol XL to wean off nitro drip as tolerated.    Problem: Urinary Elimination  Goal: Establish and maintain regular urinary output  Outcome: Progressing:  Pt incontinent of urine, had Purewick in place until up in chair. Patient encouraged to use bedside commode as tolerated.      Problem: Nutrition  Goal: Patient's nutritional and fluid intake will be adequate or improve  Outcome: Progressing:  Pt has better appetite today per family members at bedside.  Encouraged to drink supplements and sit up in chair for meals.

## 2023-07-15 ENCOUNTER — APPOINTMENT (OUTPATIENT)
Dept: RADIOLOGY | Facility: MEDICAL CENTER | Age: 88
DRG: 281 | End: 2023-07-15
Attending: INTERNAL MEDICINE
Payer: MEDICARE

## 2023-07-15 LAB
ANION GAP SERPL CALC-SCNC: 11 MMOL/L (ref 7–16)
BUN SERPL-MCNC: 23 MG/DL (ref 8–22)
CALCIUM SERPL-MCNC: 9.2 MG/DL (ref 8.5–10.5)
CHLORIDE SERPL-SCNC: 100 MMOL/L (ref 96–112)
CO2 SERPL-SCNC: 24 MMOL/L (ref 20–33)
CREAT SERPL-MCNC: 0.57 MG/DL (ref 0.5–1.4)
ERYTHROCYTE [DISTWIDTH] IN BLOOD BY AUTOMATED COUNT: 47.6 FL (ref 35.9–50)
GFR SERPLBLD CREATININE-BSD FMLA CKD-EPI: 88 ML/MIN/1.73 M 2
GLUCOSE BLD STRIP.AUTO-MCNC: 120 MG/DL (ref 65–99)
GLUCOSE BLD STRIP.AUTO-MCNC: 123 MG/DL (ref 65–99)
GLUCOSE BLD STRIP.AUTO-MCNC: 132 MG/DL (ref 65–99)
GLUCOSE BLD STRIP.AUTO-MCNC: 140 MG/DL (ref 65–99)
GLUCOSE BLD STRIP.AUTO-MCNC: 143 MG/DL (ref 65–99)
GLUCOSE SERPL-MCNC: 138 MG/DL (ref 65–99)
HCT VFR BLD AUTO: 35.3 % (ref 37–47)
HGB BLD-MCNC: 11.5 G/DL (ref 12–16)
MAGNESIUM SERPL-MCNC: 1.9 MG/DL (ref 1.5–2.5)
MCH RBC QN AUTO: 29.3 PG (ref 27–33)
MCHC RBC AUTO-ENTMCNC: 32.6 G/DL (ref 32.2–35.5)
MCV RBC AUTO: 90.1 FL (ref 81.4–97.8)
PHOSPHATE SERPL-MCNC: 3.2 MG/DL (ref 2.5–4.5)
PLATELET # BLD AUTO: 189 K/UL (ref 164–446)
PMV BLD AUTO: 10.3 FL (ref 9–12.9)
POTASSIUM SERPL-SCNC: 3.9 MMOL/L (ref 3.6–5.5)
RBC # BLD AUTO: 3.92 M/UL (ref 4.2–5.4)
SODIUM SERPL-SCNC: 135 MMOL/L (ref 135–145)
UFH PPP CHRO-ACNC: 0.31 IU/ML
WBC # BLD AUTO: 7.3 K/UL (ref 4.8–10.8)

## 2023-07-15 PROCEDURE — A9270 NON-COVERED ITEM OR SERVICE: HCPCS | Performed by: INTERNAL MEDICINE

## 2023-07-15 PROCEDURE — 770020 HCHG ROOM/CARE - TELE (206)

## 2023-07-15 PROCEDURE — 80048 BASIC METABOLIC PNL TOTAL CA: CPT

## 2023-07-15 PROCEDURE — 700111 HCHG RX REV CODE 636 W/ 250 OVERRIDE (IP): Mod: JZ | Performed by: INTERNAL MEDICINE

## 2023-07-15 PROCEDURE — 85520 HEPARIN ASSAY: CPT

## 2023-07-15 PROCEDURE — 99233 SBSQ HOSP IP/OBS HIGH 50: CPT | Performed by: HOSPITALIST

## 2023-07-15 PROCEDURE — 84100 ASSAY OF PHOSPHORUS: CPT

## 2023-07-15 PROCEDURE — 82962 GLUCOSE BLOOD TEST: CPT | Mod: 91

## 2023-07-15 PROCEDURE — 700102 HCHG RX REV CODE 250 W/ 637 OVERRIDE(OP): Performed by: INTERNAL MEDICINE

## 2023-07-15 PROCEDURE — 99291 CRITICAL CARE FIRST HOUR: CPT | Performed by: INTERNAL MEDICINE

## 2023-07-15 PROCEDURE — 85027 COMPLETE CBC AUTOMATED: CPT

## 2023-07-15 PROCEDURE — 93880 EXTRACRANIAL BILAT STUDY: CPT

## 2023-07-15 PROCEDURE — 99233 SBSQ HOSP IP/OBS HIGH 50: CPT | Performed by: INTERNAL MEDICINE

## 2023-07-15 PROCEDURE — 700102 HCHG RX REV CODE 250 W/ 637 OVERRIDE(OP): Performed by: HOSPITALIST

## 2023-07-15 PROCEDURE — A9270 NON-COVERED ITEM OR SERVICE: HCPCS | Performed by: HOSPITALIST

## 2023-07-15 PROCEDURE — 83735 ASSAY OF MAGNESIUM: CPT

## 2023-07-15 PROCEDURE — 97602 WOUND(S) CARE NON-SELECTIVE: CPT

## 2023-07-15 RX ORDER — MAGNESIUM SULFATE HEPTAHYDRATE 40 MG/ML
2 INJECTION, SOLUTION INTRAVENOUS ONCE
Status: COMPLETED | OUTPATIENT
Start: 2023-07-15 | End: 2023-07-15

## 2023-07-15 RX ORDER — POTASSIUM CHLORIDE 20 MEQ/1
40 TABLET, EXTENDED RELEASE ORAL ONCE
Status: COMPLETED | OUTPATIENT
Start: 2023-07-15 | End: 2023-07-15

## 2023-07-15 RX ORDER — ISOSORBIDE MONONITRATE 30 MG/1
30 TABLET, EXTENDED RELEASE ORAL
Status: DISCONTINUED | OUTPATIENT
Start: 2023-07-15 | End: 2023-07-17 | Stop reason: HOSPADM

## 2023-07-15 RX ORDER — HYDRALAZINE HYDROCHLORIDE 20 MG/ML
20 INJECTION INTRAMUSCULAR; INTRAVENOUS EVERY 4 HOURS PRN
Status: DISCONTINUED | OUTPATIENT
Start: 2023-07-15 | End: 2023-07-17 | Stop reason: HOSPADM

## 2023-07-15 RX ORDER — LISINOPRIL 10 MG/1
10 TABLET ORAL
Status: DISCONTINUED | OUTPATIENT
Start: 2023-07-16 | End: 2023-07-16

## 2023-07-15 RX ORDER — CLOPIDOGREL BISULFATE 75 MG/1
75 TABLET ORAL DAILY
Status: DISCONTINUED | OUTPATIENT
Start: 2023-07-15 | End: 2023-07-17 | Stop reason: HOSPADM

## 2023-07-15 RX ORDER — RANOLAZINE 500 MG/1
500 TABLET, EXTENDED RELEASE ORAL 2 TIMES DAILY
Status: DISCONTINUED | OUTPATIENT
Start: 2023-07-15 | End: 2023-07-17 | Stop reason: HOSPADM

## 2023-07-15 RX ADMIN — INSULIN HUMAN 3 UNITS: 100 INJECTION, SOLUTION PARENTERAL at 21:08

## 2023-07-15 RX ADMIN — POTASSIUM CHLORIDE 40 MEQ: 1500 TABLET, EXTENDED RELEASE ORAL at 10:16

## 2023-07-15 RX ADMIN — CLOPIDOGREL BISULFATE 75 MG: 75 TABLET ORAL at 10:20

## 2023-07-15 RX ADMIN — HYDRALAZINE HYDROCHLORIDE 20 MG: 20 INJECTION, SOLUTION INTRAMUSCULAR; INTRAVENOUS at 10:21

## 2023-07-15 RX ADMIN — METOPROLOL SUCCINATE 25 MG: 25 TABLET, EXTENDED RELEASE ORAL at 08:40

## 2023-07-15 RX ADMIN — LEVOTHYROXINE SODIUM 50 MCG: 0.05 TABLET ORAL at 05:10

## 2023-07-15 RX ADMIN — INSULIN HUMAN 3 UNITS: 100 INJECTION, SOLUTION PARENTERAL at 14:10

## 2023-07-15 RX ADMIN — ROSUVASTATIN CALCIUM 20 MG: 10 TABLET, FILM COATED ORAL at 17:05

## 2023-07-15 RX ADMIN — LISINOPRIL 5 MG: 5 TABLET ORAL at 05:10

## 2023-07-15 RX ADMIN — APIXABAN 5 MG: 5 TABLET, FILM COATED ORAL at 12:33

## 2023-07-15 RX ADMIN — INSULIN HUMAN 3 UNITS: 100 INJECTION, SOLUTION PARENTERAL at 18:34

## 2023-07-15 RX ADMIN — LEVOTHYROXINE SODIUM 50 MCG: 0.05 TABLET ORAL at 17:05

## 2023-07-15 RX ADMIN — ISOSORBIDE MONONITRATE 30 MG: 30 TABLET, EXTENDED RELEASE ORAL at 10:49

## 2023-07-15 RX ADMIN — SENNOSIDES AND DOCUSATE SODIUM 2 TABLET: 50; 8.6 TABLET ORAL at 05:10

## 2023-07-15 RX ADMIN — RANOLAZINE 500 MG: 500 TABLET, EXTENDED RELEASE ORAL at 10:20

## 2023-07-15 RX ADMIN — RANOLAZINE 500 MG: 500 TABLET, EXTENDED RELEASE ORAL at 17:05

## 2023-07-15 RX ADMIN — ASPIRIN 81 MG: 81 TABLET, COATED ORAL at 05:10

## 2023-07-15 RX ADMIN — MAGNESIUM SULFATE HEPTAHYDRATE 2 G: 2 INJECTION, SOLUTION INTRAVENOUS at 10:15

## 2023-07-15 ASSESSMENT — ENCOUNTER SYMPTOMS
COUGH: 0
HEADACHES: 0
SHORTNESS OF BREATH: 0
NAUSEA: 0
WEAKNESS: 1
DIZZINESS: 0
CHILLS: 0
VOMITING: 0
BACK PAIN: 0
FEVER: 0
PALPITATIONS: 0
GASTROINTESTINAL NEGATIVE: 1
PALPITATIONS: 1
ABDOMINAL PAIN: 0
MUSCULOSKELETAL NEGATIVE: 1
LOSS OF CONSCIOUSNESS: 0
DIARRHEA: 0
DOUBLE VISION: 0
BLURRED VISION: 0
NEUROLOGICAL NEGATIVE: 1
SHORTNESS OF BREATH: 1
EYES NEGATIVE: 1

## 2023-07-15 ASSESSMENT — PAIN DESCRIPTION - PAIN TYPE: TYPE: ACUTE PAIN;CHRONIC PAIN

## 2023-07-15 ASSESSMENT — CHA2DS2 SCORE
PRIOR STROKE OR TIA OR THROMBOEMBOLISM: NO
AGE 65 TO 74: NO
CHF OR LEFT VENTRICULAR DYSFUNCTION: NO
DIABETES: YES
AGE 75 OR GREATER: YES
VASCULAR DISEASE: YES
CHA2DS2 VASC SCORE: 6
HYPERTENSION: YES
SEX: FEMALE

## 2023-07-15 ASSESSMENT — FIBROSIS 4 INDEX
FIB4 SCORE: 3.72
FIB4 SCORE: 3.72

## 2023-07-15 NOTE — PROGRESS NOTES
4 Eyes Skin Assessment Completed by ZAK Koo and ZAK Lamb.    Head WDL  Ears Redness and Blanching  Nose WDL  Mouth WDL  Neck WDL  Breast/Chest WDL  Shoulder Blades WDL  Spine WDL  (R) Arm/Elbow/Hand Redness and Blanching  (L) Arm/Elbow/Hand Redness and Blanching  Abdomen WDL  Groin WDL  Scrotum/Coccyx/Buttocks Redness, Blanching, and Moisture Fissure  (R) Leg Swelling  (L) Leg Swelling  (R) Heel/Foot/Toe Redness and Blanching  (L) Heel/Foot/Toe Redness and Blanching          Devices In Places Tele Box, Blood Pressure Cuff, and Pulse Ox      Interventions In Place Pillows, Barrier Cream, Dri-Manolo Pads, Heels Loaded W/Pillows, and Pressure Redistribution Mattress    Possible Skin Injury Yes    Pictures Uploaded Into Epic Yes  Wound Consult Placed Yes  RN Wound Prevention Protocol Ordered Yes

## 2023-07-15 NOTE — WOUND TEAM
[facility]  Inpatient Services  Wound and Skin Care Brief Evaluation    Admission Date: 7/12/2023     Last order of IP CONSULT TO WOUND CARE was found on 7/12/2023 from Hospital Encounter on 7/12/2023     HPI, PMH, SH: Reviewed    Chief Complaint   Patient presents with    Chest Pain     That started at 3 am associated with nausea  A referral from [facility]  Pt has Afib on RVR     Treatment given:    - Adenosine 6 mg the 12 mg IV  - Metoprolol 15 mg IV  - Normal Saline 1 Liter IV  - Diltiazem 10 mg IV bolus then 5 mg/ hour infusion  - Nitroglycerine 1 tab SL  - Aspirin 324 mg tab PO    Alert and Oriented x 4         Diagnosis: Atrial fibrillation with rapid ventricular response (HCC) [I48.91]    Unit where seen by Wound Team: T836/02     Wound consult placed regarding sacrum. Chart and images reviewed. This RN in to assess patient. Pt pleasant and agreeable. Open moisture fissure to gluteal cleft with surrounding blanchable erythema, barrier paste ordered.  Remainder of back normal skin color and intact.    No pressure injuries or advanced wound care needs identified. Wound consult completed. No further follow up unless indicated and consulted.     Moisture Associated Skin Damage 07/12/23 Sacrum (Active)   First Observed Date/First Observed Time: 07/12/23 1424   Wound Location : Sacrum      Assessments 7/15/2023  3:00 PM   Wound Image     NEXT Weekly Photo (Inpatient Only) 07/22/23   Drainage Amount None   Periwound Assessment Pink   Periwound Protectant Barrier Paste   IAD Containment Device Purewick   WOUND NURSE ONLY - Time Spent with Patient (mins) 20       PREVENTATIVE INTERVENTIONS:    Q shift Nicolas - performed per nursing policy  Q shift pressure point assessments - performed per nursing policy    Surface/Positioning  Standard/trauma mattress - Currently in Place    Offloading/Redistribution  N/A      Containment/Moisture Prevention    Purwick/Condom Cath - Currently in Place  Barrier  paste - Ordered

## 2023-07-15 NOTE — PROGRESS NOTES
Pulmonary and Critical Care Medicine Progress Note    This lady is not a candidate for PCI.  Her CAD will be managed medically.  Her heparin drip will be transitioned to apixaban.  She may be safely transferred to the telemetry unit.    Davi Stevens MD  Pulmonary and Critical Care Medicine

## 2023-07-15 NOTE — PROGRESS NOTES
Dr. Cole at bedside for plan of care conversation. Daughter Chrissie and Son-in-law Don contacted via phone for conversation. Family will be at bedside tomorrow during daytime.

## 2023-07-15 NOTE — PROGRESS NOTES
"Hospital Medicine Daily Progress Note    Date of Service  7/15/2023    Chief Complaint  Nancy King is a 87 y.o. 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  Patient presented to an outlying facility with A-fib RVR and was transferred to us on diltiazem drip.  She was electrically cardioverted on July 12.  She went to the cardiac Cath Lab on July 13 where she was found to have severe three-vessel disease including a subtotal occlusion of the LAD.  Anatomy was felt not to be favorable for CABG after consultation with CT surgery, therefore the decision was made to treat her medically.    Interval Problem Update  Pt feels \"OK\".  Was up with therapy, did need a little help getting out of bed but did ok once up.      Family at [facility]    I have discussed this patient's plan of care and discharge plan at IDT rounds today with Case Management, Nursing, Nursing leadership, and other members of the IDT team.    Consultants/Specialty  cardiology and cardiovascular surgeon    Code Status  Full Code    Disposition  The patient is not medically cleared for discharge to home or a post-acute facility.      I have placed the appropriate orders for post-discharge needs.    Review of Systems  Review of Systems   Constitutional:  Negative for chills and fever.   Eyes:  Negative for blurred vision and double vision.   Respiratory:  Negative for cough and shortness of breath.    Cardiovascular:  Negative for chest pain, palpitations and leg swelling.   Gastrointestinal:  Negative for abdominal pain, diarrhea, nausea and vomiting.   Genitourinary:  Negative for dysuria and urgency.   Musculoskeletal:  Negative for back pain.   Skin:  Negative for rash.   Neurological:  Positive for weakness. Negative for " dizziness, loss of consciousness and headaches.        Physical Exam  Temp:  [35.8 °C (96.4 °F)-36.9 °C (98.4 °F)] 35.8 °C (96.4 °F)  Pulse:  [48-84] 63  Resp:  [14-16] 14  BP: (106-172)/(53-95) 172/74  SpO2:  [88 %-98 %] 92 %    Physical Exam  Constitutional:       General: She is not in acute distress.     Appearance: Normal appearance. She is well-developed. She is not diaphoretic.   HENT:      Head: Normocephalic and atraumatic.   Neck:      Vascular: No JVD.   Cardiovascular:      Rate and Rhythm: Normal rate and regular rhythm.      Heart sounds: Murmur heard.   Pulmonary:      Effort: Pulmonary effort is normal. No respiratory distress.      Breath sounds: No stridor. No wheezing or rales.   Abdominal:      Palpations: Abdomen is soft.      Tenderness: There is no abdominal tenderness. There is no guarding or rebound.   Musculoskeletal:      Right lower leg: No edema.      Left lower leg: No edema.   Skin:     General: Skin is warm and dry.      Findings: No rash.   Neurological:      General: No focal deficit present.      Mental Status: She is oriented to person, place, and time.   Psychiatric:         Mood and Affect: Mood normal.         Behavior: Behavior normal.         Thought Content: Thought content normal.         Fluids    Intake/Output Summary (Last 24 hours) at 7/15/2023 1034  Last data filed at 7/15/2023 0800  Gross per 24 hour   Intake 785.97 ml   Output 600 ml   Net 185.97 ml       Laboratory  Recent Labs     07/13/23  0142 07/14/23  0145 07/15/23  0325   WBC 10.5 7.9 7.3   RBC 3.94* 3.90* 3.92*   HEMOGLOBIN 11.7* 11.4* 11.5*   HEMATOCRIT 35.7* 35.0* 35.3*   MCV 90.6 89.7 90.1   MCH 29.7 29.2 29.3   MCHC 32.8 32.6 32.6   RDW 49.1 48.5 47.6   PLATELETCT 226 208 189   MPV 10.3 10.3 10.3     Recent Labs     07/13/23  0142 07/14/23  0145 07/15/23  0325   SODIUM 140 134* 135   POTASSIUM 3.7 4.5 3.9   CHLORIDE 103 101 100   CO2 25 21 24   GLUCOSE 107* 161* 138*   BUN 13 17 23*   CREATININE 0.72 0.67  0.57   CALCIUM 8.4* 8.9 9.2     Recent Labs     07/12/23  1214   APTT 116.3*   INR 1.52*         Recent Labs     07/13/23  0142   TRIGLYCERIDE 100   HDL 40   LDL 66       Imaging  US-CAROTID DOPPLER BILAT   Final Result      EC-ECHOCARDIOGRAM COMPLETE W/O CONT   Final Result      EC-SILVA W/O CONT         CT-CTA CHEST PULMONARY ARTERY W/ RECONS   Final Result      1.  No evidence of pulmonary embolism.   2.  Mild interstitial pulmonary edema.   3.  Atherosclerosis including coronary artery disease.            CL-CARDIOVERSION    (Results Pending)   CL-LEFT HEART CATHETERIZATION WITH POSSIBLE INTERVENTION    (Results Pending)        Assessment/Plan  * Atrial flutter with rapid ventricular response (HCC)- (present on admission)  Assessment & Plan  New diagnosis for the patient  Admit to telemetry  Plan electrical cardioversion  Had been on warfarin however we will transition to heparin drip acutely and plan DOAC on discharge  Check cardiac echo, TSH  Monitor and replace electrolytes  Cardiology involved    Hypertensive emergency  Assessment & Plan  With NSTEMI  Pressures since controled    S/P IVC filter  Assessment & Plan  Please several years ago for history of DVT and PE  He is on full dose anticoagulation will go home on the same    DW [facility] srgry: they feel given how long the filter has been in place risk of removal exceeds potential benefit    Type 2 diabetes mellitus (HCC)  Assessment & Plan  Patient is maintained on sitagliptin  A1c on this admission was 6.8 which would imply good control  Given recent cardiac Dx is a good candidate for an SGLT2    Urinary incontinence  Assessment & Plan  Chronic since she had her children      NSTEMI (non-ST elevated myocardial infarction) ([facility])  Assessment & Plan  Found to have multivessel disease on [facility]  CT surgery consulted and feel she is not a good candidate for bypass  Plan to maximize medical therapy  Cont to titrate GDMT:  Increase lisinopril to 10mg  Cont metoprolol 25,  imdur 30, ranexa 500  Consider addition of MRA  Add SGLT2 on DC    History of recurrent pulmonary embolism- (present on admission)  Assessment & Plan  Distant history of  Patient has an IVC filter in place, and is maintained on Coumadin as outpt  Have started Apixaban and will plan to DC on that  IVC reviewed with Vasc; given how long it's been in place the recommend leaving it    Chronic anticoagulation- (present on admission)  Assessment & Plan  On warfarin for history of DVT and PE  Change to DOAC    Primary hypertension- (present on admission)  Assessment & Plan  Maintained on metoprolol 100 mg as an outpatient    At present is on  -imdur 30  -lisinopril 5mg: increase to 10mg  -metoprolol SR 25mg  -ranolazine 500mg    Cont to monitor and titrate         VTE prophylaxis: therapeutic anticoagulation with apixaban    I have performed a physical exam and reviewed and updated ROS and Plan today (7/15/2023). In review of yesterday's note (7/14/2023), there are no changes except as documented above.

## 2023-07-15 NOTE — PROGRESS NOTES
Interval:  No acute events overnight. Without anginal symptoms.     Medications / Drug list prior to admission:  No current facility-administered medications on file prior to encounter.     Current Outpatient Medications on File Prior to Encounter   Medication Sig Dispense Refill    levothyroxine (SYNTHROID) 50 MCG Tab Take 50 mcg by mouth 2 times a day.      metoprolol tartrate (LOPRESSOR) 100 MG Tab Take 100 mg by mouth every morning.      acetaminophen (TYLENOL 8 HOUR ARTHRITIS PAIN) 650 MG CR tablet Take 1,300 mg by mouth every 6 hours as needed for Moderate Pain.      B Complex Vitamins (VITAMIN B COMPLEX) Tab Take 1 Tablet by mouth every morning.      simvastatin (ZOCOR) 40 MG Tab TAKE 1 TABLET BY MOUTH IN  THE EVENING 90 Tablet 0    nitroglycerin (NITROSTAT) 0.4 MG SL Tab DISSOLVE 1 TABLET UNDER THE TONGUE EVERY 5 MINUTES AS  NEEDED FOR CHEST PAIN. MAX  OF 3 TABLETS IN 15 MINUTES. CALL 911 IF PAIN PERSISTS. 50 Tablet 7    JANUVIA 100 MG Tab TAKE 1 TABLET BY MOUTH  DAILY 90 Tablet 3    warfarin (COUMADIN) 1 MG Tab TAKE 2 AND 1/2 TABLETS BY  MOUTH DAILY AS DIRECTED BY  [facility] ANTICOAGULATION  SERVICES 225 Tablet 3       Current list of administered Medications:    Current Facility-Administered Medications:     hydrALAZINE (Apresoline) injection 20 mg, 20 mg, Intravenous, Q4HRS PRN, Davi Stevens M.D., 20 mg at 07/15/23 1021    clopidogrel (Plavix) tablet 75 mg, 75 mg, Oral, DAILY, Lexx Ramos M.D., 75 mg at 07/15/23 1020    apixaban (Eliquis) tablet 5 mg, 5 mg, Oral, BID, Lexx Ramos M.D.    isosorbide mononitrate SR (Imdur) tablet 30 mg, 30 mg, Oral, Q DAY, Lexx Ramos M.D.    ranolazine (Ranexa) 500 MG SR tablet TB12 500 mg, 500 mg, Oral, BID, Lexx Ramos M.D., 500 mg at 07/15/23 1020    magnesium sulfate IVPB premix 2 g, 2 g, Intravenous, Once, Davi Stevens M.D., Last Rate: 25 mL/hr at 07/15/23 1015, 2 g at 07/15/23 1015    metoprolol SR (Toprol XL) tablet 25 mg, 25 mg,  "Oral, Q DAY, Davi Stevens M.D., 25 mg at 07/15/23 0840    aspirin EC tablet 81 mg, 81 mg, Oral, DAILY, Davi Stveens M.D., 81 mg at 07/15/23 0510    lisinopril (Prinivil) tablet 5 mg, 5 mg, Oral, Q DAY, Lexx Ramos M.D., 5 mg at 07/15/23 0510    rosuvastatin (Crestor) tablet 20 mg, 20 mg, Oral, Q EVENING, Lexx Ramos M.D., 20 mg at 07/14/23 1730    levothyroxine (Synthroid) tablet 50 mcg, 50 mcg, Oral, BID, ALEX McnamaraOSha, 50 mcg at 07/15/23 0510    senna-docusate (Pericolace Or Senokot S) 8.6-50 MG per tablet 2 Tablet, 2 Tablet, Oral, BID, 2 Tablet at 07/15/23 0510 **AND** polyethylene glycol/lytes (Miralax) PACKET 1 Packet, 1 Packet, Oral, QDAY PRN **AND** magnesium hydroxide (Milk Of Magnesia) suspension 30 mL, 30 mL, Oral, QDAY PRN **AND** bisacodyl (Dulcolax) suppository 10 mg, 10 mg, Rectal, QDAY PRN, Truman Magdaleno D.O.    Respiratory Therapy Consult, , Nebulization, Continuous RT, LANDON Mcnamara.O.    acetaminophen (Tylenol) tablet 650 mg, 650 mg, Oral, Q6HRS PRN, Truman Magdaleno D.O., 650 mg at 07/14/23 1729    ondansetron (Zofran) syringe/vial injection 4 mg, 4 mg, Intravenous, Q4HRS PRN, LANDON Mcnamara.O.    ondansetron (Zofran ODT) dispertab 4 mg, 4 mg, Oral, Q4HRS PRN, LANDON Mcnamara.O., 4 mg at 07/12/23 1629    insulin regular (HumuLIN R,NovoLIN R) injection, 3-18 Units, Subcutaneous, 4X/DAY ACHSTruman D.O., 3 Units at 07/14/23 9757    MD Alert...ICU Electrolyte Replacement per Pharmacy, , Other, PHARMACY TO DOSE, Davi Stevens M.D.    Past Medical History:   Diagnosis Date    Acute venous embolism and thrombosis of unspecified deep vessels of lower extremity     Angina pectoris 5/11/2012    \"with stress\"    Benign essential HTN 5/11/2012    CAD (coronary artery disease) 5/11/2012    Cancer (HCC)     right breast    Chronic anticoagulation 5/11/2012    Heart burn     " "Hyperlipidemia 5/11/2012    Hyperlipoproteinemia 5/11/2012    Indigestion     Obesity 5/11/2012    Other specified disorder of intestines     diarrhea    Pulmonary embolism (HCC)     Unspecified cataract     removed bilat    Unspecified disorder of thyroid     Unspecified urinary incontinence        Past Surgical History:   Procedure Laterality Date    MASTECTOMY  1/12/2015    Performed by Dwight Birch M.D. at SURGERY [facility] ORS    NODE BIOPSY SENTINEL  1/12/2015    Performed by Dwight Birch M.D. at SURGERY [facility]    AXILLARY NODE DISSECTION  1/12/2015    Performed by Dwight Birch M.D. at SURGERY [facility] ORS    APPENDECTOMY      CHOLECYSTECTOMY      HYSTERECTOMY, TOTAL ABDOMINAL      OTHER      Bladder    TONSILLECTOMY AND ADENOIDECTOMY         Family History   Problem Relation Age of Onset    Heart Disease Mother     Heart Disease Father      Patient family history was personally reviewed, no pertinent family history to current presentation    Social History     Socioeconomic History    Marital status:      Spouse name: Not on file    Number of children: Not on file    Years of education: Not on file    Highest education level: Not on file   Occupational History    Not on file   Tobacco Use    Smoking status: Never    Smokeless tobacco: Never   Vaping Use    Vaping Use: Never used   Substance and Sexual Activity    Alcohol use: Yes     Comment: \"Couple drinks every night\"    Drug use: No    Sexual activity: Not on file   Other Topics Concern    Not on file   Social History Narrative    Not on file     Social Determinants of Health     Financial Resource Strain: Not on file   Food Insecurity: Not on file   Transportation Needs: Not on file   Physical Activity: Not on file   Stress: Not on file   Social Connections: Not on file   Intimate Partner Violence: Not on file   Housing Stability: Not on file       ALLERGIES:  Allergies   Allergen Reactions    Sulfa Drugs      Swelling    " Levofloxacin Rash     Rash         Review of systems:  A complete review of symptoms was reviewed with patient. This is reviewed in H&P and PMH. ALL OTHERS reviewed and negative    Physical exam:  Vitals:    07/15/23 0600 07/15/23 0700 07/15/23 0800 07/15/23 0900   BP: 132/61 138/61 (!) 157/67 (!) 172/74   Pulse: (!) 56 61 76 63   Resp: 14      Temp:   (!) 35.8 °C (96.4 °F)    TempSrc:   Temporal    SpO2: 94% 96% 95% 92%   Weight:       Height:         General: No acute distress.   EYES: no jaundice  HEENT: OP clear   Neck: No bruits No JVD.   CVS:  RRR. S1 + S2. No M/R/G. No edema.  Resp: CTAB. No wheezing or crackles/rhonchi.  Abdomen: Soft, NT, ND,  Skin: Grossly nothing acute no obvious rashes  Neurological: Alert, Moves all extremities, no cranial nerve defects on limited exam  Extremities: Pulse 2+ in b/l LE. No cyanosis.     Data:  Laboratory studies personally reviewed by me:  Recent Results (from the past 24 hour(s))   POCT glucose device results    Collection Time: 07/14/23 12:30 PM   Result Value Ref Range    POC Glucose, Blood 145 (H) 65 - 99 mg/dL   POCT glucose device results    Collection Time: 07/14/23  5:28 PM   Result Value Ref Range    POC Glucose, Blood 154 (H) 65 - 99 mg/dL   POCT glucose device results    Collection Time: 07/14/23  8:45 PM   Result Value Ref Range    POC Glucose, Blood 125 (H) 65 - 99 mg/dL   Basic Metabolic Panel (BMP)    Collection Time: 07/15/23  3:25 AM   Result Value Ref Range    Sodium 135 135 - 145 mmol/L    Potassium 3.9 3.6 - 5.5 mmol/L    Chloride 100 96 - 112 mmol/L    Co2 24 20 - 33 mmol/L    Glucose 138 (H) 65 - 99 mg/dL    Bun 23 (H) 8 - 22 mg/dL    Creatinine 0.57 0.50 - 1.40 mg/dL    Calcium 9.2 8.5 - 10.5 mg/dL    Anion Gap 11.0 7.0 - 16.0   CBC without Differential    Collection Time: 07/15/23  3:25 AM   Result Value Ref Range    WBC 7.3 4.8 - 10.8 K/uL    RBC 3.92 (L) 4.20 - 5.40 M/uL    Hemoglobin 11.5 (L) 12.0 - 16.0 g/dL    Hematocrit 35.3 (L) 37.0 - 47.0  %    MCV 90.1 81.4 - 97.8 fL    MCH 29.3 27.0 - 33.0 pg    MCHC 32.6 32.2 - 35.5 g/dL    RDW 47.6 35.9 - 50.0 fL    Platelet Count 189 164 - 446 K/uL    MPV 10.3 9.0 - 12.9 fL   MAGNESIUM    Collection Time: 07/15/23  3:25 AM   Result Value Ref Range    Magnesium 1.9 1.5 - 2.5 mg/dL   PHOSPHORUS    Collection Time: 07/15/23  3:25 AM   Result Value Ref Range    Phosphorus 3.2 2.5 - 4.5 mg/dL   Heparin Xa (Unfractionated)    Collection Time: 07/15/23  3:25 AM   Result Value Ref Range    Heparin Xa (UFH) 0.31 IU/mL   ESTIMATED GFR    Collection Time: 07/15/23  3:25 AM   Result Value Ref Range    GFR (CKD-EPI) 88 >60 mL/min/1.73 m 2   POCT glucose device results    Collection Time: 07/15/23  8:42 AM   Result Value Ref Range    POC Glucose, Blood 140 (H) 65 - 99 mg/dL     EKG 7/12/23 0639 atrial flutter 113 bpm, LVH, nonspecific t changes    All pertinent features of laboratory and imaging reviewed including primary images where applicable    TTE 7/12/23  CONCLUSIONS  Left ventricular systolic function is normal.  Calcified aortic valve leaflets with moderately reduced motion.  Mild-Moderate aortic valve stenosis.  Transvalvular gradients are - Peak: 25 mmHg, Mean: 13 mmHg.    [facility] 7/13/23  HEMODYNAMICS:  Aortic pressure: 117 /50/78 mmHg  LVEDP: 11 mmHg  Peak-Peak gradient across AV 20mmHg (mild-moderate AS)     CORONARY ANGIOGRAPHY:  The left main coronary artery : Large-caliber vessel that bifurcates to LAD and left circumflex  The left anterior descending coronary artery : Large in caliber vessel with subtotal occlusion in its mid segment after takeoff of 2 diagonal branches.  Unsure if transapical vessel, with CARLINE 1 flow.  The diagonal branches appear to be medium-large in caliber with severe ostial/proximal 80-90% stenoses in both.  The left circumflex coronary artery : Large-caliber vessel that gives rise to a high OM1 that appears to be large in caliber with diffuse proximal/mid 80-90% disease.  The circumflex has  a diffuse segment of calcified 70-80% tubular stenosis throughout its midportion before gives rise to 2 OM branches.  Large OM 2 with severe ostial 90% stenosis.  Medium-large in caliber OM 3 with severe 90% proximal/mid stenosis.  The right coronary artery  : Large in caliber calcified vessel with diffuse nonobstructive CAD.     IMPRESSION:  1.  Severe multivessel calcified CAD including subtotally occluded mid LAD, not very optimal for PCI given the anatomy of the severe stenoses and calcifications.  2.  Normal resting LVEDP 11 mmHg   3.  Mild-moderate AS    Principal Problem:    Atrial flutter with rapid ventricular response (HCC) (POA: Yes)  Active Problems:    Hypertensive emergency (POA: Unknown)    Primary hypertension (POA: Yes)    CAD (coronary artery disease) (Chronic) (POA: Yes)    History of recurrent pulmonary embolism (POA: Yes)    Dyslipidemia (POA: Yes)    NSTEMI (non-ST elevated myocardial infarction) (HCC) (POA: Unknown)    Urinary incontinence (POA: Unknown)    Type 2 diabetes mellitus (HCC) (POA: Unknown)    S/P IVC filter (POA: Unknown)    Chronic anticoagulation (Chronic) (POA: Yes)    Breast cancer (HCC) (POA: Yes)  Resolved Problems:    * No resolved hospital problems. *      Assessment / Plan:  87 year old woman with PMH CAD, HTN, HLD, DM2 presents with angina found nstemi and new atrial flutter. Incidental mild to mod AS on TTE. Previously found functional testing with apical ischemia. S/p SILVA/DCCV to sinus rhythm 7/12/23. S/p LHC 7/13/23 with multivessel complex CAD as above. Deemed prohibitive risk for revascularization by CT surgery and not amenable to PCI by interventional cardiology.    -change to aspirin, plavix, doac for one month and thereafter doac and p2y12i  -titrate antianginals.  -BB and ACEi as tolerated  -if remains stable consider discharge tomorrow. Ok to downgrade to tele from cardiology perspective.     I personally discussed her case with Dr Stevens. Kd  conversation with patient and family to discuss findings and planned management. They understand and are in agreement.     It is my pleasure to participate in the care of Ms. King.  Please do not hesitate to contact me with questions or concerns.    Lexx Ramos MD  Cardiologist [facility]     A total of 58 minutes of time was spent on day of encounter reviewing medical record, performing history and examination, counseling, ordering medication/test/consults and documentation.

## 2023-07-15 NOTE — PROGRESS NOTES
Critical Care Progress Note    Date of admission  7/12/2023    Chief Complaint  87 y.o. female admitted 7/12/2023 with atrial flutter with RVR, a hypertensive emergency and an acute NSTEMI.  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    Hospital Course      7/13 -    in sinus rhythm following SILVA cardioversion yesterday.  Titrating heparin drip.  Titrating nitroglycerin drip.  Continue aspirin and statin.  Continue metoprolol tartrate.  Awaiting cardiac catheterization.  7/14 -    she remains in sinus rhythm.  Cardiac catheterization yesterday with multivessel CAD.  Cardiothoracic surgery consultation.  Titrating nitroglycerin drip.  Titrating heparin drip.  Begin lisinopril, 5 mg daily.  Change metoprolol tartrate to metoprolol succinate.  7/15 -    evaluated by cardiothoracic surgery and she is not a candidate for CABG.  Medical management versus PCI.  She is off her nitroglycerin drip.  I am titrating a heparin drip.      Interval Problem Update  Reviewed last 24 hour events:      SR  Off nitroglycerin drip  Titrating heparin drip  98.4  +652 mL in the last 24      Review of Systems  Review of Systems   Unable to perform ROS: Acuity of condition        Vital Signs for last 24 hours   Temp:  [35.8 °C (96.5 °F)-36.9 °C (98.4 °F)] 35.8 °C (96.5 °F)  Pulse:  [48-84] 56  Resp:  [14-16] 14  BP: (106-169)/(53-98) 132/61  SpO2:  [88 %-98 %] 94 %    Hemodynamic parameters for last 24 hours       Respiratory Information for the last 24 hours       Physical Exam   Physical Exam  Constitutional:       Appearance: She is not diaphoretic.   HENT:      Head: Normocephalic.      Nose: Nose normal.      Mouth/Throat:      Pharynx: Oropharynx is clear.   Eyes:      General: No scleral icterus.     Pupils: Pupils are equal, round, and reactive to light.    Cardiovascular:      Comments: Sinus rhythm  Pulmonary:      Breath sounds: No wheezing or rales.   Abdominal:      General: There is no distension.      Tenderness: There is no abdominal tenderness.   Musculoskeletal:      Cervical back: Normal range of motion.      Right lower leg: Edema present.      Left lower leg: Edema present.   Skin:     General: Skin is warm.      Capillary Refill: Capillary refill takes less than 2 seconds.   Neurological:      General: No focal deficit present.      Mental Status: She is oriented to person, place, and time.      Cranial Nerves: No cranial nerve deficit.         Medications  Current Facility-Administered Medications   Medication Dose Route Frequency Provider Last Rate Last Admin    metoprolol SR (Toprol XL) tablet 25 mg  25 mg Oral Q DAY Davi Stevens M.D.   25 mg at 07/14/23 1229    aspirin EC tablet 81 mg  81 mg Oral DAILY Davi Stevens M.D.   81 mg at 07/15/23 0510    lisinopril (Prinivil) tablet 5 mg  5 mg Oral Q DAY Lexx Ramos M.D.   5 mg at 07/15/23 0510    rosuvastatin (Crestor) tablet 20 mg  20 mg Oral Q EVENING Lexx Ramos M.D.   20 mg at 07/14/23 1730    heparin infusion 25,000 units in 500 mL 0.45% NACL  0-30 Units/kg/hr (Adjusted) Intravenous Continuous Truman Magdaleno D.OSha 16.3 mL/hr at 07/15/23 0435 12 Units/kg/hr at 07/15/23 0435    heparin injection 2,000 Units  30 Units/kg (Adjusted) Intravenous PRN Truman Magdaleno D.O.        levothyroxine (Synthroid) tablet 50 mcg  50 mcg Oral BID Truman Magdaleno D.O.   50 mcg at 07/15/23 0510    senna-docusate (Pericolace Or Senokot S) 8.6-50 MG per tablet 2 Tablet  2 Tablet Oral BID Truman Magdaleno D.O.   2 Tablet at 07/15/23 0510    And    polyethylene glycol/lytes (Miralax) PACKET 1 Packet  1 Packet Oral QDAY PRN Truman Magdaleno D.O.        And    magnesium hydroxide (Milk Of Magnesia) suspension 30 mL  30 mL Oral QDAY PRN Truman Magdaleno  D.OSha        And    bisacodyl (Dulcolax) suppository 10 mg  10 mg Rectal QDAY PRN Truman Magdaleno D.O.        Respiratory Therapy Consult   Nebulization Continuous RT Truman Magdaleno D.O.        acetaminophen (Tylenol) tablet 650 mg  650 mg Oral Q6HRS PRN LANDON Mcnamara.OSha   650 mg at 07/14/23 1729    ondansetron (Zofran) syringe/vial injection 4 mg  4 mg Intravenous Q4HRS PRN LANDON Mcnamara.OSha        ondansetron (Zofran ODT) dispertab 4 mg  4 mg Oral Q4HRS PRN LANDON Mcnamara.OSha   4 mg at 07/12/23 1629    insulin regular (HumuLIN R,NovoLIN R) injection  3-18 Units Subcutaneous 4X/DAY ACHALEX VuongOSha   3 Units at 07/14/23 1735    nitroglycerin 50 mg in D5W 250 ml infusion  0-200 mcg/min Intravenous Continuous CATRACHITA ChaneyRShaN.   Paused at 07/14/23 1623    MD Alert...ICU Electrolyte Replacement per Pharmacy   Other PHARMACY TO DOSE Davi Stevens M.D.           Fluids    Intake/Output Summary (Last 24 hours) at 7/15/2023 0814  Last data filed at 7/15/2023 0600  Gross per 24 hour   Intake 812.1 ml   Output 600 ml   Net 212.1 ml       Laboratory          Recent Labs     07/13/23 0142 07/14/23  0145 07/15/23  0325   SODIUM 140 134* 135   POTASSIUM 3.7 4.5 3.9   CHLORIDE 103 101 100   CO2 25 21 24   BUN 13 17 23*   CREATININE 0.72 0.67 0.57   MAGNESIUM 2.4 2.1 1.9   PHOSPHORUS 4.0 3.1 3.2   CALCIUM 8.4* 8.9 9.2     Recent Labs     07/13/23  0142 07/14/23  0145 07/15/23  0325   GLUCOSE 107* 161* 138*     Recent Labs     07/13/23  0142 07/14/23  0145 07/15/23  0325   WBC 10.5 7.9 7.3     Recent Labs     07/12/23  1214 07/13/23  0142 07/14/23  0145 07/15/23  0325   RBC  --  3.94* 3.90* 3.92*   HEMOGLOBIN  --  11.7* 11.4* 11.5*   HEMATOCRIT  --  35.7* 35.0* 35.3*   PLATELETCT  --  226 208 189   PROTHROMBTM 18.0*  --   --   --    APTT 116.3*  --   --   --    INR 1.52*  --   --   --        Imaging  None    Assessment/Plan  * Atrial flutter with  rapid ventricular response (HCC)- (present on admission)  Assessment & Plan  She is in sinus rhythm following SILVA cardioversion on 7/12  Optimize potassium and magnesium  I am titrating heparin drip based upon serial anti-Xa monitoring  Metoprolol succinate, 25 mg daily    Hypertensive emergency  Assessment & Plan  Associated angina pectoris, acute NSTEMI and acute pulmonary edema  Resolved    NSTEMI (non-ST elevated myocardial infarction) ([facility])  Assessment & Plan  Acute non-STEMI  Echocardiogram with LVEF of 55%  Cardiac catheterization on 7/13 with severe multivessel calcified CAD with subtotally occluded mid LAD  Cardiothoracic surgery has evaluated her and she is not a candidate for CABG  I am titrating a heparin drip based upon serial anti-Xa determinations  Continue aspirin and statin  Metoprolol succinate, 25 mg daily  Query medical management versus PCI - cardiology following    Type 2 diabetes mellitus ([facility])  Assessment & Plan  Glycohemoglobin 6.8  Sliding scale insulin    Chronic anticoagulation- (present on admission)  Assessment & Plan  On chronic warfarin for history of recurrent PE/DVT  I am titrating a heparin drip based upon serial anti-Xa determinations    CAD (coronary artery disease)- (present on admission)  Assessment & Plan  Multivessel CAD on cardiac catheterization on 7/13  Not a candidate for cardiothoracic surgery  Query medical management versus PCI    Primary hypertension- (present on admission)  Assessment & Plan  Continue lisinopril, 5 mg daily  Metoprolol succinate, 25 mg daily    S/P IVC filter  Assessment & Plan  IVC filter placement in 2015    Dyslipidemia- (present on admission)  Assessment & Plan  Continue statin    Breast cancer ([facility])- (present on admission)  Assessment & Plan  History of stage IA infiltrating ductal carcinoma  S/P partial mastectomy in 2015 with adjuvant radiation    History of recurrent pulmonary embolism- (present on admission)  Assessment & Plan  On chronic  anticoagulation with warfarin  IVC filter in place  I am titrating a heparin drip based upon serial anti-Xa monitoring         VTE:  Heparin  Ulcer: Not Indicated  Lines: None    I have performed a physical exam and reviewed and updated ROS and Plan today (7/15/2023). In review of yesterday's note (7/14/2023), there are no changes except as documented above.     I have assessed and reassessed her blood pressure, hemodynamics, cardiovascular status as well as serial determinations of her anticoagulation status with titration of a heparin drip.  She is at increased risk for worsening cardiovascular and hematologic system dysfunction.    Discussed patient condition and risk of morbidity and/or mortality with RN, RT, Pharmacy, Charge nurse / hot rounds, and QA team    The patient remains critically ill.  Critical care time = 35 minutes in directly providing and coordinating critical care and extensive data review.  No time overlap and excludes procedures.    Davi Stevens MD  Pulmonary and Critical Care Medicine

## 2023-07-15 NOTE — CARE PLAN
The patient is Watcher - Medium risk of patient condition declining or worsening    Shift Goals  Clinical Goals: eat, ctsx conversation  Patient Goals: sleep  Family Goals: discuss plan of care    Progress made toward(s) clinical / shift goals:    Problem: Pain - Standard  Goal: Alleviation of pain or a reduction in pain to the patient’s comfort goal  Outcome: Progressing     Problem: Knowledge Deficit - Standard  Goal: Patient and family/care givers will demonstrate understanding of plan of care, disease process/condition, diagnostic tests and medications  Outcome: Progressing     Problem: Skin Integrity  Goal: Skin integrity is maintained or improved  Outcome: Progressing     Problem: Fall Risk  Goal: Patient will remain free from falls  Outcome: Progressing     Problem: Hemodynamics  Goal: Patient's hemodynamics, fluid balance and neurologic status will be stable or improve  Outcome: Progressing  Note: Maintain SBP<150 off nitro gtt.         Patient is not progressing towards the following goals:

## 2023-07-15 NOTE — DISCHARGE PLANNING
Received choice form at: 8222  Agency/Facility name: 1. [facility] 2. [facility] 3. [facility]  Referral sent per choice form at:  1400

## 2023-07-15 NOTE — DISCHARGE PLANNING
Case Management Discharge Planning    Admission Date: 7/12/2023  GMLOS: 2.4  ALOS: 3    6-Clicks ADL Score: 17  6-Clicks Mobility Score: 15      Anticipated Discharge Dispo: Discharge Disposition: D/T to SNF with Medicare cert in anticipation of skilled care (03)  Discharge Contact Phone Number: [phone]    LMSW collaborated with pt and family regarding discharge planning.  LMSW obtained choice.  LMSW faxed choice form to [facility] to send out choices.  LMSW to follow up with romel regarding bed availability as we are anticipating a discharge of tomorrow 7/16/23      1395 Update  LMSW followed up with Malena at [facility] for update  Malean not available to look at referral at the time called  CM to follow up regarding acceptance.  Will have bed availability tomorrow.  Plan for pt to DC tomorrow to SNF

## 2023-07-16 LAB
ANION GAP SERPL CALC-SCNC: 12 MMOL/L (ref 7–16)
BUN SERPL-MCNC: 20 MG/DL (ref 8–22)
CALCIUM SERPL-MCNC: 9.6 MG/DL (ref 8.5–10.5)
CHLORIDE SERPL-SCNC: 102 MMOL/L (ref 96–112)
CO2 SERPL-SCNC: 22 MMOL/L (ref 20–33)
CREAT SERPL-MCNC: 0.85 MG/DL (ref 0.5–1.4)
GFR SERPLBLD CREATININE-BSD FMLA CKD-EPI: 67 ML/MIN/1.73 M 2
GLUCOSE BLD STRIP.AUTO-MCNC: 113 MG/DL (ref 65–99)
GLUCOSE BLD STRIP.AUTO-MCNC: 114 MG/DL (ref 65–99)
GLUCOSE BLD STRIP.AUTO-MCNC: 124 MG/DL (ref 65–99)
GLUCOSE BLD STRIP.AUTO-MCNC: 128 MG/DL (ref 65–99)
GLUCOSE SERPL-MCNC: 123 MG/DL (ref 65–99)
MAGNESIUM SERPL-MCNC: 2.1 MG/DL (ref 1.5–2.5)
PHOSPHATE SERPL-MCNC: 3 MG/DL (ref 2.5–4.5)
POTASSIUM SERPL-SCNC: 4.5 MMOL/L (ref 3.6–5.5)
SODIUM SERPL-SCNC: 136 MMOL/L (ref 135–145)

## 2023-07-16 PROCEDURE — 82962 GLUCOSE BLOOD TEST: CPT | Mod: 91

## 2023-07-16 PROCEDURE — A9270 NON-COVERED ITEM OR SERVICE: HCPCS | Performed by: NURSE PRACTITIONER

## 2023-07-16 PROCEDURE — A9270 NON-COVERED ITEM OR SERVICE: HCPCS | Performed by: INTERNAL MEDICINE

## 2023-07-16 PROCEDURE — 700102 HCHG RX REV CODE 250 W/ 637 OVERRIDE(OP): Performed by: INTERNAL MEDICINE

## 2023-07-16 PROCEDURE — 99233 SBSQ HOSP IP/OBS HIGH 50: CPT | Performed by: INTERNAL MEDICINE

## 2023-07-16 PROCEDURE — A9270 NON-COVERED ITEM OR SERVICE: HCPCS | Performed by: HOSPITALIST

## 2023-07-16 PROCEDURE — 83735 ASSAY OF MAGNESIUM: CPT

## 2023-07-16 PROCEDURE — 700102 HCHG RX REV CODE 250 W/ 637 OVERRIDE(OP): Performed by: HOSPITALIST

## 2023-07-16 PROCEDURE — 80048 BASIC METABOLIC PNL TOTAL CA: CPT

## 2023-07-16 PROCEDURE — 770020 HCHG ROOM/CARE - TELE (206)

## 2023-07-16 PROCEDURE — 700102 HCHG RX REV CODE 250 W/ 637 OVERRIDE(OP): Performed by: NURSE PRACTITIONER

## 2023-07-16 PROCEDURE — 99233 SBSQ HOSP IP/OBS HIGH 50: CPT | Performed by: STUDENT IN AN ORGANIZED HEALTH CARE EDUCATION/TRAINING PROGRAM

## 2023-07-16 PROCEDURE — 700111 HCHG RX REV CODE 636 W/ 250 OVERRIDE (IP): Performed by: INTERNAL MEDICINE

## 2023-07-16 PROCEDURE — 84100 ASSAY OF PHOSPHORUS: CPT

## 2023-07-16 PROCEDURE — 36415 COLL VENOUS BLD VENIPUNCTURE: CPT

## 2023-07-16 RX ORDER — LISINOPRIL 10 MG/1
10 TABLET ORAL ONCE
Status: COMPLETED | OUTPATIENT
Start: 2023-07-16 | End: 2023-07-16

## 2023-07-16 RX ORDER — LISINOPRIL 20 MG/1
20 TABLET ORAL
Status: DISCONTINUED | OUTPATIENT
Start: 2023-07-17 | End: 2023-07-17 | Stop reason: HOSPADM

## 2023-07-16 RX ORDER — OMEPRAZOLE 20 MG/1
40 CAPSULE, DELAYED RELEASE ORAL DAILY
Status: DISCONTINUED | OUTPATIENT
Start: 2023-07-16 | End: 2023-07-17 | Stop reason: HOSPADM

## 2023-07-16 RX ORDER — DEXTROSE MONOHYDRATE 25 G/50ML
25 INJECTION, SOLUTION INTRAVENOUS
Status: DISCONTINUED | OUTPATIENT
Start: 2023-07-16 | End: 2023-07-17 | Stop reason: HOSPADM

## 2023-07-16 RX ADMIN — ASPIRIN 81 MG: 81 TABLET, COATED ORAL at 05:34

## 2023-07-16 RX ADMIN — OMEPRAZOLE 40 MG: 20 CAPSULE, DELAYED RELEASE ORAL at 09:20

## 2023-07-16 RX ADMIN — ISOSORBIDE MONONITRATE 30 MG: 30 TABLET, EXTENDED RELEASE ORAL at 05:36

## 2023-07-16 RX ADMIN — LEVOTHYROXINE SODIUM 50 MCG: 0.05 TABLET ORAL at 05:34

## 2023-07-16 RX ADMIN — SENNOSIDES AND DOCUSATE SODIUM 2 TABLET: 50; 8.6 TABLET ORAL at 18:39

## 2023-07-16 RX ADMIN — LISINOPRIL 10 MG: 10 TABLET ORAL at 05:36

## 2023-07-16 RX ADMIN — LISINOPRIL 10 MG: 10 TABLET ORAL at 14:03

## 2023-07-16 RX ADMIN — CLOPIDOGREL BISULFATE 75 MG: 75 TABLET ORAL at 05:34

## 2023-07-16 RX ADMIN — APIXABAN 5 MG: 5 TABLET, FILM COATED ORAL at 05:34

## 2023-07-16 RX ADMIN — INSULIN HUMAN 3 UNITS: 100 INJECTION, SOLUTION PARENTERAL at 18:43

## 2023-07-16 RX ADMIN — METOPROLOL SUCCINATE 25 MG: 25 TABLET, EXTENDED RELEASE ORAL at 05:35

## 2023-07-16 RX ADMIN — RANOLAZINE 500 MG: 500 TABLET, EXTENDED RELEASE ORAL at 05:36

## 2023-07-16 RX ADMIN — LEVOTHYROXINE SODIUM 50 MCG: 0.05 TABLET ORAL at 18:39

## 2023-07-16 RX ADMIN — HYDRALAZINE HYDROCHLORIDE 20 MG: 20 INJECTION, SOLUTION INTRAMUSCULAR; INTRAVENOUS at 20:34

## 2023-07-16 RX ADMIN — RANOLAZINE 500 MG: 500 TABLET, EXTENDED RELEASE ORAL at 18:39

## 2023-07-16 RX ADMIN — APIXABAN 5 MG: 5 TABLET, FILM COATED ORAL at 18:39

## 2023-07-16 RX ADMIN — ROSUVASTATIN CALCIUM 20 MG: 10 TABLET, FILM COATED ORAL at 18:39

## 2023-07-16 RX ADMIN — INSULIN HUMAN 3 UNITS: 100 INJECTION, SOLUTION PARENTERAL at 09:19

## 2023-07-16 ASSESSMENT — ENCOUNTER SYMPTOMS
ABDOMINAL DISTENTION: 0
SHORTNESS OF BREATH: 0
LOSS OF CONSCIOUSNESS: 0
DIARRHEA: 0
DIAPHORESIS: 0
BLURRED VISION: 0
DOUBLE VISION: 0
WEAKNESS: 1
FATIGUE: 0
DIZZINESS: 0
COUGH: 0
FEVER: 0
NAUSEA: 0
ABDOMINAL PAIN: 0
PALPITATIONS: 0
BACK PAIN: 0
COLOR CHANGE: 0
HEADACHES: 0
ACTIVITY CHANGE: 0
VOMITING: 0
CHEST TIGHTNESS: 0
CHILLS: 0

## 2023-07-16 ASSESSMENT — PAIN DESCRIPTION - PAIN TYPE: TYPE: ACUTE PAIN

## 2023-07-16 NOTE — PROGRESS NOTES
Cardiology Follow Up Progress Note    Date of Service  7/16/2023    Attending Physician  Femi Cm M.D.    Chief Complaint   Chest pain    HPI  Nancy King is a 87 y.o. female admitted 7/12/2023 with medical history significant for RCA, s/p chest radiation, CAD, DVT/PE, GERD, HLD, HTN, hypothyroid.  Patient presented to [facility] on 711 with chest pain transferred for higher level of care.  On admission was found to be in atrial flutter, treated with diltiazem and her chest pain resolved.  Underwent left heart cath which showed multivessel disease, CT surgery consulted, patient was deemed to not to be a surgical candidate.  Images again reviewed by cardiology and no amenable to PCI intervention was warranted, decision for patient to be medically managed.    Interim Events  No issues overnight, no recurrence of chest pain.  Discussed fact that patient was unable to undergo bypass surgery and that no angiography intervention was possible at this time.  Discussed medication management and follow-up with patient, all questions answered    Review of Systems  Review of Systems   Constitutional:  Negative for activity change, diaphoresis and fatigue.   Respiratory:  Negative for cough, chest tightness and shortness of breath.    Cardiovascular:  Negative for chest pain, palpitations and leg swelling.   Gastrointestinal:  Negative for abdominal distention and abdominal pain.   Skin:  Negative for color change.   Neurological:  Negative for dizziness, syncope and headaches.       Vital signs in last 24 hours  Temp:  [35.8 °C (96.4 °F)-37 °C (98.6 °F)] 36.7 °C (98.1 °F)  Pulse:  [63-80] 76  Resp:  [16-20] 16  BP: (118-172)/() 142/102  SpO2:  [92 %-97 %] 94 %    Physical Exam  Physical Exam  Vitals and nursing note reviewed.   Constitutional:       General: She is not in acute distress.     Appearance: She is normal weight. She is not ill-appearing.   HENT:      Head: Normocephalic.   Cardiovascular:       Rate and Rhythm: Normal rate and regular rhythm.      Pulses: Normal pulses.      Heart sounds: Normal heart sounds. No murmur heard.  Pulmonary:      Effort: Pulmonary effort is normal.      Breath sounds: Normal breath sounds. No decreased air movement. No wheezing.   Abdominal:      Tenderness: There is no abdominal tenderness.   Musculoskeletal:      Right lower leg: No edema.      Left lower leg: No edema.   Skin:     General: Skin is warm and dry.   Neurological:      General: No focal deficit present.      Mental Status: She is alert and oriented to person, place, and time.   Psychiatric:         Behavior: Behavior is cooperative.         Lab Review  Lab Results   Component Value Date/Time    WBC 7.3 07/15/2023 03:25 AM    RBC 3.92 (L) 07/15/2023 03:25 AM    HEMOGLOBIN 11.5 (L) 07/15/2023 03:25 AM    HEMATOCRIT 35.3 (L) 07/15/2023 03:25 AM    MCV 90.1 07/15/2023 03:25 AM    MCH 29.3 07/15/2023 03:25 AM    MCHC 32.6 07/15/2023 03:25 AM    MPV 10.3 07/15/2023 03:25 AM      Lab Results   Component Value Date/Time    SODIUM 136 07/16/2023 05:29 AM    POTASSIUM 4.5 07/16/2023 05:29 AM    CHLORIDE 102 07/16/2023 05:29 AM    CO2 22 07/16/2023 05:29 AM    GLUCOSE 123 (H) 07/16/2023 05:29 AM    BUN 20 07/16/2023 05:29 AM    CREATININE 0.85 07/16/2023 05:29 AM      Lab Results   Component Value Date/Time    ASTSGOT 32 07/12/2023 06:50 AM    ALTSGPT 15 07/12/2023 06:50 AM     Lab Results   Component Value Date/Time    CHOLSTRLTOT 126 07/13/2023 01:42 AM    LDL 66 07/13/2023 01:42 AM    HDL 40 07/13/2023 01:42 AM    TRIGLYCERIDE 100 07/13/2023 01:42 AM    TROPONINT 973 (H) 07/12/2023 12:14 PM       No results for input(s): NTPROBNP in the last 72 hours.    Cardiac Imaging and Procedures Review  ECHOCARDIOGRAM: 7/12/23 [facility]  Left ventricular systolic function is normal.  Calcified aortic valve leaflets with moderately reduced motion.  Mild-Moderate aortic valve stenosis.  Transvalvular gradients are - Peak: 25 mmHg,  Mean: 13 mmHg.     ANGIOGRAM: 7/13/23 [facility] [place]  HEMODYNAMICS:  Aortic pressure: 117 /50/78 mmHg  LVEDP: 11 mmHg  Peak-Peak gradient across AV 20mmHg (mild-moderate AS)     CORONARY ANGIOGRAPHY:  The left main coronary artery : Large-caliber vessel that bifurcates to LAD and left circumflex  The left anterior descending coronary artery : Large in caliber vessel with subtotal occlusion in its mid segment after takeoff of 2 diagonal branches.  Unsure if transapical vessel, with CARLINE 1 flow.  The diagonal branches appear to be medium-large in caliber with severe ostial/proximal 80-90% stenoses in both.  The left circumflex coronary artery : Large-caliber vessel that gives rise to a high OM1 that appears to be large in caliber with diffuse proximal/mid 80-90% disease.  The circumflex has a diffuse segment of calcified 70-80% tubular stenosis throughout its midportion before gives rise to 2 OM branches.  Large OM 2 with severe ostial 90% stenosis.  Medium-large in caliber OM 3 with severe 90% proximal/mid stenosis.  The right coronary artery  : Large in caliber calcified vessel with diffuse nonobstructive CAD.     IMPRESSION:  1.  Severe multivessel calcified CAD including subtotally occluded mid LAD, not very optimal for PCI given the anatomy of the severe stenoses and calcifications.  2.  Normal resting LVEDP 11 mmHg   3.  Mild-moderate AS    Assessment/Plan  #CAD  #NSTEMI, not amenable to PCI, not surgical candidate  #Atrial flutter  #Mild to moderate AAS  #Hypertension  #Hyperlipidemia    -S/p SILVA DCCV on 7/12/2023  -Left heart cath on 7/13/2023 with multivessel complex CAD  -Continue Imdur and Ranexa  -Beta-blocker and ACE as tolerated  -Transitioned off heparin drip to Eliquis  -Given triple anticoagulation therapy with aspirin, Plavix and Eliquis we will also start patient on preventative omeprazole  -Will schedule follow-up with cardiology office in the next few weeks    Thank you for allowing us to participate in the  care of this patient.    Please note this dictation was created using voice recognition software.  I have made every reasonable attempt to correct obvious errors, but there may be errors of grammar and possibly content that I did not discover before finalizing the note.    Carey Andersen, MSN, APRISIS  [facility]  [phone]     My total time spent caring for the patient on the day of the encounter was 16 minutes.   This does not include time spent on separately billable procedures/tests.

## 2023-07-16 NOTE — PROGRESS NOTES
MONITOR SUMMARY:    RHYTHM: SR  HEART RATE: 66-77bpm  ECTOPY: (r)PVC  MEASUREMENT: .18/.08/.33

## 2023-07-16 NOTE — CARE PLAN
The patient is Stable - Low risk of patient condition declining or worsening    Shift Goals  Clinical Goals: monitor HR & rhythm; safety  Patient Goals: DC tomorrow  Family Goals: keon; n/a at this time    Progress made toward(s) clinical / shift goals:  progressing     Problem: Knowledge Deficit  Goal: Patient/Significant other demonstrates understanding of disease process, treatment plan, medications and discharge instructions  Outcome: Progressing     Problem: Skin Integrity  Goal: Skin integrity is maintained or improved  Outcome: Progressing     Problem: Fall Risk  Goal: Patient will remain free from falls  Outcome: Progressing     Problem: Hemodynamics  Goal: Patient's hemodynamics, fluid balance and neurologic status will be stable or improve  Outcome: Progressing     Problem: Venous Thromboembolism (VTE) Prevention  Goal: The patient will remain free from venous thromboembolism (VTE)  Outcome: Progressing       Patient is not progressing towards the following goals:

## 2023-07-17 ENCOUNTER — PATIENT OUTREACH (OUTPATIENT)
Dept: SCHEDULING | Facility: IMAGING CENTER | Age: 88
End: 2023-07-17
Payer: MEDICARE

## 2023-07-17 VITALS
BODY MASS INDEX: 33.65 KG/M2 | OXYGEN SATURATION: 90 % | SYSTOLIC BLOOD PRESSURE: 111 MMHG | HEART RATE: 116 BPM | HEIGHT: 65 IN | TEMPERATURE: 98.2 F | DIASTOLIC BLOOD PRESSURE: 68 MMHG | WEIGHT: 201.94 LBS | RESPIRATION RATE: 16 BRPM

## 2023-07-17 LAB
ANION GAP SERPL CALC-SCNC: 14 MMOL/L (ref 7–16)
BUN SERPL-MCNC: 17 MG/DL (ref 8–22)
CALCIUM SERPL-MCNC: 9.6 MG/DL (ref 8.5–10.5)
CHLORIDE SERPL-SCNC: 99 MMOL/L (ref 96–112)
CO2 SERPL-SCNC: 20 MMOL/L (ref 20–33)
CREAT SERPL-MCNC: 0.73 MG/DL (ref 0.5–1.4)
GFR SERPLBLD CREATININE-BSD FMLA CKD-EPI: 80 ML/MIN/1.73 M 2
GLUCOSE BLD STRIP.AUTO-MCNC: 144 MG/DL (ref 65–99)
GLUCOSE SERPL-MCNC: 117 MG/DL (ref 65–99)
MAGNESIUM SERPL-MCNC: 1.8 MG/DL (ref 1.5–2.5)
PHOSPHATE SERPL-MCNC: 2.9 MG/DL (ref 2.5–4.5)
POTASSIUM SERPL-SCNC: 4.4 MMOL/L (ref 3.6–5.5)
SODIUM SERPL-SCNC: 133 MMOL/L (ref 135–145)

## 2023-07-17 PROCEDURE — 700102 HCHG RX REV CODE 250 W/ 637 OVERRIDE(OP): Performed by: INTERNAL MEDICINE

## 2023-07-17 PROCEDURE — 97530 THERAPEUTIC ACTIVITIES: CPT

## 2023-07-17 PROCEDURE — 700102 HCHG RX REV CODE 250 W/ 637 OVERRIDE(OP): Performed by: HOSPITALIST

## 2023-07-17 PROCEDURE — A9270 NON-COVERED ITEM OR SERVICE: HCPCS | Performed by: INTERNAL MEDICINE

## 2023-07-17 PROCEDURE — 700102 HCHG RX REV CODE 250 W/ 637 OVERRIDE(OP): Performed by: NURSE PRACTITIONER

## 2023-07-17 PROCEDURE — 84100 ASSAY OF PHOSPHORUS: CPT

## 2023-07-17 PROCEDURE — 83735 ASSAY OF MAGNESIUM: CPT

## 2023-07-17 PROCEDURE — 80048 BASIC METABOLIC PNL TOTAL CA: CPT

## 2023-07-17 PROCEDURE — 82962 GLUCOSE BLOOD TEST: CPT

## 2023-07-17 PROCEDURE — A9270 NON-COVERED ITEM OR SERVICE: HCPCS | Performed by: HOSPITALIST

## 2023-07-17 PROCEDURE — 99239 HOSP IP/OBS DSCHRG MGMT >30: CPT | Performed by: STUDENT IN AN ORGANIZED HEALTH CARE EDUCATION/TRAINING PROGRAM

## 2023-07-17 PROCEDURE — A9270 NON-COVERED ITEM OR SERVICE: HCPCS | Performed by: NURSE PRACTITIONER

## 2023-07-17 PROCEDURE — 36415 COLL VENOUS BLD VENIPUNCTURE: CPT

## 2023-07-17 RX ORDER — RANOLAZINE 500 MG/1
500 TABLET, EXTENDED RELEASE ORAL 2 TIMES DAILY
Qty: 60 TABLET | Refills: 3 | Status: SHIPPED | OUTPATIENT
Start: 2023-07-17 | End: 2023-08-09 | Stop reason: SDUPTHER

## 2023-07-17 RX ORDER — ISOSORBIDE MONONITRATE 30 MG/1
30 TABLET, EXTENDED RELEASE ORAL DAILY
Qty: 30 TABLET | Refills: 0 | Status: SHIPPED | OUTPATIENT
Start: 2023-07-18 | End: 2023-08-09 | Stop reason: SDUPTHER

## 2023-07-17 RX ORDER — ASPIRIN 81 MG/1
81 TABLET ORAL DAILY
Qty: 100 TABLET | Refills: 0 | Status: SHIPPED | OUTPATIENT
Start: 2023-07-18 | End: 2023-08-14

## 2023-07-17 RX ORDER — CLOPIDOGREL BISULFATE 75 MG/1
75 TABLET ORAL DAILY
Qty: 30 TABLET | Refills: 0 | Status: SHIPPED | OUTPATIENT
Start: 2023-07-18 | End: 2023-08-09 | Stop reason: SDUPTHER

## 2023-07-17 RX ORDER — LISINOPRIL 20 MG/1
20 TABLET ORAL DAILY
Qty: 30 TABLET | Refills: 0 | Status: SHIPPED | OUTPATIENT
Start: 2023-07-18 | End: 2023-08-09 | Stop reason: SDUPTHER

## 2023-07-17 RX ORDER — OMEPRAZOLE 40 MG/1
40 CAPSULE, DELAYED RELEASE ORAL DAILY
Qty: 30 CAPSULE | Refills: 0 | Status: SHIPPED | OUTPATIENT
Start: 2023-07-18

## 2023-07-17 RX ORDER — ROSUVASTATIN CALCIUM 20 MG/1
20 TABLET, COATED ORAL EVERY EVENING
Qty: 30 TABLET | Refills: 11 | Status: SHIPPED | OUTPATIENT
Start: 2023-07-17 | End: 2023-08-09 | Stop reason: SDUPTHER

## 2023-07-17 RX ORDER — METOPROLOL SUCCINATE 25 MG/1
25 TABLET, EXTENDED RELEASE ORAL DAILY
Qty: 30 TABLET | Refills: 1 | Status: SHIPPED | OUTPATIENT
Start: 2023-07-18 | End: 2023-08-14

## 2023-07-17 RX ADMIN — INSULIN HUMAN 3 UNITS: 100 INJECTION, SOLUTION PARENTERAL at 13:14

## 2023-07-17 RX ADMIN — APIXABAN 5 MG: 5 TABLET, FILM COATED ORAL at 04:24

## 2023-07-17 RX ADMIN — RANOLAZINE 500 MG: 500 TABLET, EXTENDED RELEASE ORAL at 04:32

## 2023-07-17 RX ADMIN — OMEPRAZOLE 40 MG: 20 CAPSULE, DELAYED RELEASE ORAL at 04:27

## 2023-07-17 RX ADMIN — ISOSORBIDE MONONITRATE 30 MG: 30 TABLET, EXTENDED RELEASE ORAL at 04:31

## 2023-07-17 RX ADMIN — LISINOPRIL 20 MG: 20 TABLET ORAL at 05:14

## 2023-07-17 RX ADMIN — ASPIRIN 81 MG: 81 TABLET, COATED ORAL at 04:24

## 2023-07-17 RX ADMIN — CLOPIDOGREL BISULFATE 75 MG: 75 TABLET ORAL at 04:26

## 2023-07-17 RX ADMIN — LEVOTHYROXINE SODIUM 50 MCG: 0.05 TABLET ORAL at 04:24

## 2023-07-17 RX ADMIN — METOPROLOL SUCCINATE 25 MG: 25 TABLET, EXTENDED RELEASE ORAL at 05:14

## 2023-07-17 ASSESSMENT — COGNITIVE AND FUNCTIONAL STATUS - GENERAL
MOVING FROM LYING ON BACK TO SITTING ON SIDE OF FLAT BED: A LITTLE
MOVING TO AND FROM BED TO CHAIR: A LITTLE
TURNING FROM BACK TO SIDE WHILE IN FLAT BAD: A LITTLE
SUGGESTED CMS G CODE MODIFIER MOBILITY: CK
STANDING UP FROM CHAIR USING ARMS: A LITTLE
MOBILITY SCORE: 17
CLIMB 3 TO 5 STEPS WITH RAILING: A LOT
WALKING IN HOSPITAL ROOM: A LITTLE

## 2023-07-17 ASSESSMENT — GAIT ASSESSMENTS
GAIT LEVEL OF ASSIST: MINIMAL ASSIST
DISTANCE (FEET): 8
ASSISTIVE DEVICE: FRONT WHEEL WALKER
DEVIATION: SHUFFLED GAIT

## 2023-07-17 ASSESSMENT — PAIN DESCRIPTION - PAIN TYPE: TYPE: ACUTE PAIN

## 2023-07-17 NOTE — PROGRESS NOTES
MONITOR SUMMARY:    RHYTHM:  SR  HEART RATE:  69-79bpm  ECTOPY: (r)PAC  MEASUREMENT: .17/.09/.48

## 2023-07-17 NOTE — DISCHARGE INSTRUCTIONS
Diagnosis:  Acute Coronary Syndrome (ACS) is a diagnosis that encompasses cardiac-related chest pain and heart attack. ACS occurs when the blood flow to the heart muscle is severely reduced or cut off completely due to a slow process called atherosclerosis.  Atherosclerosis is a disease in which the coronary arteries become narrow from a buildup of fat, cholesterol, and other substances that combine to form plaque. If the plaque breaks, a blood clot will form and block the blood flow to the heart muscle. This lack of blood flow can cause damage or death to the heart muscle which is called a heart attack or Myocardial Infarction (MI). There are two different types of MIs:  ST Elevation Myocardial Infarction or STEMI (the most severe type of heart attack) and Non-ST Elevation Myocardial Infarction or NSTEMI.    Treatment Plan:  Cardiac Diet  - Low fat, low salt, low cholesterol   Cardiac Rehab  - Your doctor has ordered you a referral to [facility].  Call [phone] to schedule an appointment.  Attend my follow-up appointment with my Cardiologist.  Take my medications as prescribed by my doctor  Exercise daily  Reduce stress and Control my diabetes    Medications:  Certain medications are used to treat ACS.  Remember to always take medications as prescribed and never stop talking medications unless told by your doctor.    You have been prescribed the following medicatons:    Aspirin - Aspirin is used as a blood thinning medication and you will require this medication indefinitely.  Anti-platelet/blood thinner - Your Anti-platelet/Blood thinning medication is called apixaban, and is used in combination with aspirin to prevent clots from forming in your heart and/or around your stent.  Your doctor will determine how long you need to be on this medicine.  Beta-Blocker - Beta-Blocker metoprolol is used to lower blood pressure and heart rate, and/or helps your heart heal after a heart attack.  Statin - Statin rosuvastatin  is used to lower cholesterol.  Angiotensin Converting Enzyme (ACE) Inhibitor - Angiotensin Converting Enzyme Inhibitor lisinopril is used to lower blood pressure and treat heart failure.

## 2023-07-17 NOTE — DISCHARGE SUMMARY
Discharge Summary    CHIEF COMPLAINT ON ADMISSION  Chief Complaint   Patient presents with    Chest Pain     That started at 3 am associated with nausea  A referral from [facility]  Pt has Afib on RVR     Treatment given:    - Adenosine 6 mg the 12 mg IV  - Metoprolol 15 mg IV  - Normal Saline 1 Liter IV  - Diltiazem 10 mg IV bolus then 5 mg/ hour infusion  - Nitroglycerine 1 tab SL  - Aspirin 324 mg tab PO    Alert and Oriented x 4           Reason for Admission  EMS    Admission Date  7/12/2023     CODE STATUS  Full Code    HPI & HOSPITAL COURSE  Hospital Course  87-year-old admitted July 12 previous medical history type 2 diabetes, breast cancer, 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  Patient presented to an [facility] facility with A-fib RVR and was transferred to us on diltiazem drip.  She was electrically cardioverted on July 12.  She went to the cardiac Cath Lab on July 13 where she was found to have severe three-vessel disease including a subtotal occlusion of the LAD.  Anatomy was felt not to be favorable for CABG after consultation with CT surgery, therefore the decision was made to treat her medically.    Atrial flutter with rapid ventricular response (HCC)- (present on admission)  Assessment & Plan  New diagnosis for the patient  Admit to telemetry  Plan electrical cardioversion  Had been on warfarin however we will transition to heparin drip acutely and plan DOAC on discharge  Check cardiac echo, TSH  Monitor and replace electrolytes  Cardiology involved     Hypertensive emergency  Assessment & Plan  With NSTEMI  Pressures since controled     S/P IVC filter  Assessment & Plan  Please several years ago for history of DVT and PE  He is on full dose anticoagulation will go home on the same     DW [facility] gry: they feel given how long the filter has been in place risk of removal exceeds potential benefit     Type 2  diabetes mellitus (HCC)  Assessment & Plan  Patient is maintained on sitagliptin  A1c on this admission was 6.8 which would imply good control  Given recent cardiac Dx is a good candidate for an SGLT2     Urinary incontinence  Assessment & Plan  Chronic since she had her children        NSTEMI (non-ST elevated myocardial infarction) (HCC)  Assessment & Plan  Found to have multivessel disease on [facility]  CT surgery consulted and feel she is not a good candidate for bypass  Plan to maximize medical therapy  Cont to titrate GDMT:  Increase lisinopril to 10mg  Cont metoprolol 25, imdur 30, ranexa 500  Consider addition of MRA  Add SGLT2 on DC     History of recurrent pulmonary embolism- (present on admission)  Assessment & Plan  Distant history of  Patient has an IVC filter in place, and is maintained on Coumadin as outpt  Have started Apixaban and will plan to DC on that  IVC reviewed with Vasc; given how long it's been in place the recommend leaving it     Chronic anticoagulation- (present on admission)  Assessment & Plan  On warfarin for history of DVT and PE  Change to DOAC     Primary hypertension- (present on admission)  Assessment & Plan  Maintained on metoprolol 100 mg as an outpatient     At present is on  -imdur 30  -lisinopril 5mg: increase to 10mg  -metoprolol SR 25mg  -ranolazine 500mg     Cont to monitor and titrate     CARDIOLOGY NOTE  Assessment/Plan  #CAD  #NSTEMI, not amenable to PCI, not surgical candidate  #Atrial flutter  #Mild to moderate AAS  #Hypertension  #Hyperlipidemia     -S/p SILVA DCCV on 7/12/2023  -Left heart cath on 7/13/2023 with multivessel complex CAD  -Continue Imdur and Ranexa  -Beta-blocker and ACE as tolerated  -Transitioned off heparin drip to Eliquis  -Given triple anticoagulation therapy with aspirin, Plavix and Eliquis we will also start patient on preventative omeprazole  -Will schedule follow-up with cardiology office in the next few weeks       Therefore, she is discharged in  good and stable condition to an inpatient rehabilitation hospital.    The patient met 2-midnight criteria for an inpatient stay at the time of discharge.      FOLLOW UP ITEMS POST DISCHARGE  Going to inpatient rehab    DISCHARGE DIAGNOSES  Principal Problem:    Atrial flutter with rapid ventricular response (HCC) (POA: Yes)  Active Problems:    Primary hypertension (POA: Yes)    CAD (coronary artery disease) (Chronic) (POA: Yes)    Chronic anticoagulation (Chronic) (POA: Yes)    History of recurrent pulmonary embolism (POA: Yes)    Breast cancer (HCC) (POA: Yes)    Dyslipidemia (POA: Yes)    NSTEMI (non-ST elevated myocardial infarction) (HCC) (POA: Unknown)    Urinary incontinence (POA: Unknown)    Type 2 diabetes mellitus (HCC) (POA: Unknown)    S/P IVC filter (POA: Unknown)    Hypertensive emergency (POA: Unknown)  Resolved Problems:    * No resolved hospital problems. *      FOLLOW UP  Future Appointments   Date Time Provider Department Center   10/4/2023  1:45 PM TROY Burdick None     primary care provider    Follow up  Please call your primary care provider to schedule a hospital follow up. Thank you.      MEDICATIONS ON DISCHARGE     Medication List        START taking these medications        Instructions   apixaban 5mg Tabs  Commonly known as: Eliquis   Take 1 Tablet by mouth 2 times a day. Indications: Thromboembolism secondary to Atrial Fibrillation  Dose: 5 mg     aspirin 81 MG EC tablet  Start taking on: July 18, 2023   Take 1 Tablet by mouth every day.  Dose: 81 mg     clopidogrel 75 MG Tabs  Start taking on: July 18, 2023  Commonly known as: Plavix   Take 1 Tablet by mouth every day.  Dose: 75 mg     isosorbide mononitrate SR 30 MG Tb24  Start taking on: July 18, 2023  Commonly known as: Imdur   Take 1 Tablet by mouth every day.  Dose: 30 mg     lisinopril 20 MG Tabs  Start taking on: July 18, 2023  Commonly known as: Prinivil   Take 1 Tablet by mouth every day.  Dose: 20 mg      metoprolol SR 25 MG Tb24  Start taking on: July 18, 2023  Commonly known as: Toprol XL   Take 1 Tablet by mouth every day.  Dose: 25 mg     omeprazole 40 MG delayed-release capsule  Start taking on: July 18, 2023  Commonly known as: PriLOSEC   Take 1 Capsule by mouth every day.  Dose: 40 mg     ranolazine 500 MG Tb12  Commonly known as: Ranexa   Take 1 Tablet by mouth 2 times a day.  Dose: 500 mg     rosuvastatin 20 MG Tabs  Commonly known as: Crestor   Take 1 Tablet by mouth every evening.  Dose: 20 mg            CONTINUE taking these medications        Instructions   Januvia 100 MG Tabs  Generic drug: SITagliptin   Doctor's comments: Requesting 1 year supply  TAKE 1 TABLET BY MOUTH  DAILY     levothyroxine 50 MCG Tabs  Commonly known as: Synthroid   Take 50 mcg by mouth 2 times a day.  Dose: 50 mcg     nitroglycerin 0.4 MG Subl  Commonly known as: Nitrostat   Doctor's comments: Requesting 1 year supply  DISSOLVE 1 TABLET UNDER THE TONGUE EVERY 5 MINUTES AS  NEEDED FOR CHEST PAIN. MAX  OF 3 TABLETS IN 15 MINUTES. CALL 911 IF PAIN PERSISTS.     Tylenol 8 Hour Arthritis Pain 650 MG CR tablet  Generic drug: acetaminophen   Take 1,300 mg by mouth every 6 hours as needed for Moderate Pain.  Dose: 1,300 mg     Vitamin B Complex Tabs   Take 1 Tablet by mouth every morning.  Dose: 1 Tablet            STOP taking these medications      metoprolol tartrate 100 MG Tabs  Commonly known as: Lopressor     simvastatin 40 MG Tabs  Commonly known as: Zocor     warfarin 1 MG Tabs  Commonly known as: Coumadin              Allergies  Allergies   Allergen Reactions    Sulfa Drugs      Swelling    Levofloxacin Rash     Rash         DIET  Orders Placed This Encounter   Procedures    Diet Order Diet: Cardiac; Second Modifier: (optional): Consistent CHO (Diabetic)     Standing Status:   Standing     Number of Occurrences:   1     Order Specific Question:   Diet:     Answer:   Cardiac [6]     Order Specific Question:   Second Modifier:  (optional)     Answer:   Consistent CHO (Diabetic) [4]       ACTIVITY  As tolerated.  Weight bearing as tolerated    LINES, DRAINS, AND WOUNDS  This is an automated list. Peripheral IVs will be removed prior to discharge.  Peripheral IV 07/13/23 20 G Anterior;Right Forearm (Active)   Site Assessment Clean;Dry;Intact 07/17/23 0845   Dressing Type Transparent 07/17/23 0845   Line Status Saline locked 07/17/23 0845   Dressing Status Clean;Dry;Intact 07/17/23 0845   Dressing Intervention N/A 07/17/23 0845   Dressing Change Due 07/15/23 07/15/23 0800   Date Primary Tubing Changed 07/13/23 07/14/23 0800   NEXT Primary Tubing Change  07/15/23 07/14/23 0800   Date IV Connector(s) Changed 07/13/23 07/14/23 0800   Infiltration Grading ([facility], [facility]) 0 07/17/23 0845   Phlebitis Scale ([facility] Only) 0 07/17/23 0845       Peripheral IV 07/15/23 20 G Anterior;Left Forearm (Active)   Site Assessment Clean;Dry;Intact 07/17/23 0845   Dressing Type Transparent 07/17/23 0845   Line Status Saline locked 07/17/23 0845   Dressing Status Clean;Dry;Intact 07/17/23 0845   Dressing Intervention N/A 07/17/23 0845   Dressing Change Due 07/22/23 07/15/23 0900   Infiltration Grading ([facility], [facility]) 0 07/17/23 0845   Phlebitis Scale ([facility] Only) 0 07/17/23 0845     External Urinary Catheter (Female Wick) (Active)   Collection Container Suction container 07/15/23 0800   Suction Level (Female Wick Catheter) 20 mmHg 07/14/23 2000   Output (mL) 300 mL 07/15/23 0600      Moisture Associated Skin Damage 07/12/23 Sacrum (Active)   Wound Image   07/15/23 1500   NEXT Weekly Photo (Inpatient Only) 07/22/23 07/15/23 1500   Drainage Amount None 07/15/23 1500   Periwound Assessment Pink;Red 07/17/23 0845   IAD Cleansing Soap and Water 07/14/23 0400   Periwound Protectant Barrier Paste 07/17/23 0845   IAD Containment Device Purewick 07/15/23 1500   WOUND NURSE ONLY - Time Spent with Patient (mins) 20 07/15/23 1500       Peripheral IV 07/13/23 20 G  Anterior;Right Forearm (Active)   Site Assessment Clean;Dry;Intact 07/17/23 0845   Dressing Type Transparent 07/17/23 0845   Line Status Saline locked 07/17/23 0845   Dressing Status Clean;Dry;Intact 07/17/23 0845   Dressing Intervention N/A 07/17/23 0845   Dressing Change Due 07/15/23 07/15/23 0800   Date Primary Tubing Changed 07/13/23 07/14/23 0800   NEXT Primary Tubing Change  07/15/23 07/14/23 0800   Date IV Connector(s) Changed 07/13/23 07/14/23 0800   Infiltration Grading ([facility], [facility]) 0 07/17/23 0845   Phlebitis Scale ([facility] Only) 0 07/17/23 0845       Peripheral IV 07/15/23 20 G Anterior;Left Forearm (Active)   Site Assessment Clean;Dry;Intact 07/17/23 0845   Dressing Type Transparent 07/17/23 0845   Line Status Saline locked 07/17/23 0845   Dressing Status Clean;Dry;Intact 07/17/23 0845   Dressing Intervention N/A 07/17/23 0845   Dressing Change Due 07/22/23 07/15/23 0900   Infiltration Grading ([facility]) 0 07/17/23 0845   Phlebitis Scale ([facility] Only) 0 07/17/23 0845               MENTAL STATUS ON TRANSFER             CONSULTATIONS  Cardiology, CT surgery    PROCEDURES  Cardiac Imaging and Procedures Review  [place] [facility] [place]  Left ventricular systolic function is normal.  Calcified aortic valve leaflets with moderately reduced motion.  Mild-Moderate aortic valve stenosis.  Transvalvular gradients are - Peak: 25 mmHg, Mean: 13 mmHg.     [place] [facility] [place]  HEMODYNAMICS:  Aortic pressure: 117 /50/78 mmHg  LVEDP: 11 mmHg  Peak-Peak gradient across AV 20mmHg (mild-moderate AS)     CORONARY ANGIOGRAPHY:  The left main coronary artery : Large-caliber vessel that bifurcates to LAD and left circumflex  The left anterior descending coronary artery : Large in caliber vessel with subtotal occlusion in its mid segment after takeoff of 2 diagonal branches.  Unsure if transapical vessel, with CARLINE 1 flow.  The diagonal branches appear to be medium-large in caliber with severe ostial/proximal 80-90%  stenoses in both.  The left circumflex coronary artery : Large-caliber vessel that gives rise to a high OM1 that appears to be large in caliber with diffuse proximal/mid 80-90% disease.  The circumflex has a diffuse segment of calcified 70-80% tubular stenosis throughout its midportion before gives rise to 2 OM branches.  Large OM 2 with severe ostial 90% stenosis.  Medium-large in caliber OM 3 with severe 90% proximal/mid stenosis.  The right coronary artery  : Large in caliber calcified vessel with diffuse nonobstructive CAD.     IMPRESSION:  1.  Severe multivessel calcified CAD including subtotally occluded mid LAD, not very optimal for PCI given the anatomy of the severe stenoses and calcifications.  2.  Normal resting LVEDP 11 mmHg   3.  Mild-moderate AS  LABORATORY  Lab Results   Component Value Date    SODIUM 133 (L) 07/17/2023    POTASSIUM 4.4 07/17/2023    CHLORIDE 99 07/17/2023    CO2 20 07/17/2023    GLUCOSE 117 (H) 07/17/2023    BUN 17 07/17/2023    CREATININE 0.73 07/17/2023        Lab Results   Component Value Date    WBC 7.3 07/15/2023    HEMOGLOBIN 11.5 (L) 07/15/2023    HEMATOCRIT 35.3 (L) 07/15/2023    PLATELETCT 189 07/15/2023        Total time of the discharge process exceeds 35 minutes.

## 2023-07-17 NOTE — DISCHARGE PLANNING
RN URMILA informed primary nurse and attending MD of acceptance at [facility]. Also, informed patient of acceptance and request from facility of 3pm transport p/u. Daughter in room as well and both verbalized understanding.    Wheelchair transport request faxed to [facility] for 3pm p/u time.

## 2023-07-17 NOTE — DISCHARGE PLANNING
Agency/Facility Name: [facility]  Spoke To: Lisa  Outcome: SNF informed DPA they can take Pt today, asking that RN CM arrange transport today for 1500. SNF asking that DPA manually fax DC Summary once it's in, as SNF's Epic is down.     RN CM notified.     1350-  Agency/Facility Name: [facility]  Spoke To: Lisa  Outcome: SNF asking for DC Summary, needs it within 15 minutes.     RN CM notified    1420-  Agency/Facility Name: [facility]  Spoke To: Lisa  Outcome: SNF asking for DC Summary, DPA manually faxed.

## 2023-07-17 NOTE — PROGRESS NOTES
Hospital Medicine Daily Progress Note    Date of Service  7/16/2023    Chief Complaint  Nancy King is a 87 y.o. 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  Patient presented to an outlying facility with A-fib RVR and was transferred to us on diltiazem drip.  She was electrically cardioverted on July 12.  She went to the cardiac Cath Lab on July 13 where she was found to have severe three-vessel disease including a subtotal occlusion of the LAD.  Anatomy was felt not to be favorable for CABG after consultation with CT surgery, therefore the decision was made to treat her medically.    Interval Problem Update  Family at bedside, all questions answered.  No distress, tolerating PO intake, encouraged to work with PT. Discussed with cardiology today who were fine with us discharging when ready. Waiting for SNF.   Labs/vitals stable.    I have discussed this patient's plan of care and discharge plan at IDT rounds today with Case Management, Nursing, Nursing leadership, and other members of the IDT team.    Consultants/Specialty  cardiology and cardiovascular surgeon    Code Status  Full Code    Disposition  Medically Cleared  I have placed the appropriate orders for post-discharge needs.    Review of Systems  Review of Systems   Constitutional:  Negative for chills and fever.   Eyes:  Negative for blurred vision and double vision.   Respiratory:  Negative for cough and shortness of breath.    Cardiovascular:  Negative for chest pain, palpitations and leg swelling.   Gastrointestinal:  Negative for abdominal pain, diarrhea, nausea and vomiting.   Genitourinary:  Negative for dysuria and urgency.   Musculoskeletal:  Negative for back pain.   Skin:  Negative for rash.   Neurological:   Positive for weakness. Negative for dizziness, loss of consciousness and headaches.        Physical Exam  Temp:  [36.2 °C (97.2 °F)-36.7 °C (98.1 °F)] 36.7 °C (98.1 °F)  Pulse:  [66-78] 78  Resp:  [16-20] 19  BP: (129-196)/() 141/59  SpO2:  [94 %-95 %] 94 %    Physical Exam  Constitutional:       General: She is not in acute distress.     Appearance: Normal appearance. She is well-developed. She is not diaphoretic.   HENT:      Head: Normocephalic and atraumatic.   Neck:      Vascular: No JVD.   Cardiovascular:      Rate and Rhythm: Normal rate and regular rhythm.      Heart sounds: Murmur heard.   Pulmonary:      Effort: Pulmonary effort is normal. No respiratory distress.      Breath sounds: No stridor. No wheezing or rales.   Abdominal:      Palpations: Abdomen is soft.      Tenderness: There is no abdominal tenderness. There is no guarding or rebound.   Musculoskeletal:      Right lower leg: No edema.      Left lower leg: No edema.   Skin:     General: Skin is warm and dry.      Findings: No rash.   Neurological:      General: No focal deficit present.      Mental Status: She is oriented to person, place, and time.   Psychiatric:         Mood and Affect: Mood normal.         Behavior: Behavior normal.         Thought Content: Thought content normal.         Fluids    Intake/Output Summary (Last 24 hours) at 7/16/2023 2311  Last data filed at 7/16/2023 0359  Gross per 24 hour   Intake 300 ml   Output 0 ml   Net 300 ml         Laboratory  Recent Labs     07/14/23  0145 07/15/23  0325   WBC 7.9 7.3   RBC 3.90* 3.92*   HEMOGLOBIN 11.4* 11.5*   HEMATOCRIT 35.0* 35.3*   MCV 89.7 90.1   MCH 29.2 29.3   MCHC 32.6 32.6   RDW 48.5 47.6   PLATELETCT 208 189   MPV 10.3 10.3       Recent Labs     07/14/23  0145 07/15/23  0325 07/16/23  0529   SODIUM 134* 135 136   POTASSIUM 4.5 3.9 4.5   CHLORIDE 101 100 102   CO2 21 24 22   GLUCOSE 161* 138* 123*   BUN 17 23* 20   CREATININE 0.67 0.57 0.85   CALCIUM 8.9 9.2 9.6                          Imaging  US-CAROTID DOPPLER BILAT   Final Result      EC-ECHOCARDIOGRAM COMPLETE W/O CONT   Final Result      EC-SILVA W/O CONT         CT-CTA CHEST PULMONARY ARTERY W/ RECONS   Final Result      1.  No evidence of pulmonary embolism.   2.  Mild interstitial pulmonary edema.   3.  Atherosclerosis including coronary artery disease.            CL-CARDIOVERSION    (Results Pending)   CL-LEFT HEART CATHETERIZATION WITH POSSIBLE INTERVENTION    (Results Pending)          Assessment/Plan  * Atrial flutter with rapid ventricular response (HCC)- (present on admission)  Assessment & Plan  New diagnosis for the patient  Admit to telemetry  Plan electrical cardioversion  Had been on warfarin however we will transition to heparin drip acutely and plan DOAC on discharge  Check cardiac echo, TSH  Monitor and replace electrolytes  Cardiology involved    Hypertensive emergency  Assessment & Plan  With NSTEMI  Pressures since controled    S/P IVC filter  Assessment & Plan  Please several years ago for history of DVT and PE  He is on full dose anticoagulation will go home on the same    DW [facility] srgry: they feel given how long the filter has been in place risk of removal exceeds potential benefit    Type 2 diabetes mellitus (HCC)  Assessment & Plan  Patient is maintained on sitagliptin  A1c on this admission was 6.8 which would imply good control  Given recent cardiac Dx is a good candidate for an SGLT2    Urinary incontinence  Assessment & Plan  Chronic since she had her children      NSTEMI (non-ST elevated myocardial infarction) ([facility])  Assessment & Plan  Found to have multivessel disease on [facility]  CT surgery consulted and feel she is not a good candidate for bypass  Plan to maximize medical therapy  Cont to titrate GDMT:  Increase lisinopril to 10mg  Cont metoprolol 25, imdur 30, ranexa 500  Consider addition of MRA  Add SGLT2 on DC    History of recurrent pulmonary embolism- (present on admission)  Assessment &  Plan  Distant history of  Patient has an IVC filter in place, and is maintained on Coumadin as outpt  Have started Apixaban and will plan to DC on that  IVC reviewed with Vasc; given how long it's been in place the recommend leaving it    Chronic anticoagulation- (present on admission)  Assessment & Plan  On warfarin for history of DVT and PE  Change to DOAC    Primary hypertension- (present on admission)  Assessment & Plan  Maintained on metoprolol 100 mg as an outpatient    At present is on  -imdur 30  -lisinopril 5mg: increase to 10mg  -metoprolol SR 25mg  -ranolazine 500mg    Cont to monitor and titrate         VTE prophylaxis: therapeutic anticoagulation with apixaban    I have performed a physical exam and reviewed and updated ROS and Plan today (7/16/2023). In review of yesterday's note (7/15/2023), there are no changes except as documented above.    Total time spent 51 minutes. I spent greater than 50% of the time for patient care, counseling, and coordination on this date, including unit/floor time, and face-to-face time with the patient as per interval events, my own review of patient's imaging and lab analysis and developing my assessment and plan above.

## 2023-07-17 NOTE — PROGRESS NOTES
Pt dc'd to [facility]. IV and monitor removed; monitor room notified. Pt left unit via wheelchair with transport and . Personal belongings with pt when leaving unit. Copy of discharge instructions with pt and in the chart.

## 2023-07-17 NOTE — CARE PLAN
The patient is Stable - Low risk of patient condition declining or worsening    Shift Goals  Clinical Goals: Vitals, HR, BP, chest pain  Patient Goals: d/c  Family Goals: keon; n/a at this time    Progress made toward(s) clinical / shift goals:  progressing     Problem: Knowledge Deficit - Standard  Goal: Patient and family/care givers will demonstrate understanding of plan of care, disease process/condition, diagnostic tests and medications  Outcome: Progressing     Problem: Skin Integrity  Goal: Skin integrity is maintained or improved  Outcome: Progressing     Problem: Fall Risk  Goal: Patient will remain free from falls  Outcome: Progressing     Problem: Hemodynamics  Goal: Patient's hemodynamics, fluid balance and neurologic status will be stable or improve  Outcome: Progressing       Patient is not progressing towards the following goals:

## 2023-07-17 NOTE — DISCHARGE PLANNING
DC Transport Scheduled    Received request at: 7/17/2023 at 1136    Transport Company Scheduled:  [employer]    Scheduled Date: 7/17/2023  Scheduled Time: 1500    Destination: [facility] at [address] [facility] [address]     Notified care team of scheduled transport via [employer].     If there are any changes needed to the DC transportation scheduled, please contact [facility] at ext. [phone] between the hours of 4369-2735 Mon-Fri. If outside those hours, contact the ED Case Manager at ext. [phone].

## 2023-07-17 NOTE — THERAPY
Physical Therapy   Daily Treatment     Patient Name: Nancy King  Age:  87 y.o., Sex:  female  Medical Record #: [id number]  Today's Date: 7/17/2023     Precautions  Precautions: Fall Risk;Cardiac Precautions (See Comments)    Assessment    Patient received sitting EOB with bed alarm sounding, reported she needed to use restroom.  Patient demonstrated improvement in functional mobility, ambulating to/from bathroom with FWW and min A with cues for FWW safety & intermittent physical assist to manage FWW.  Patient continues to demonstrate decreased safety awareness, activity tolerance, balance, coordination, and sequencing which impact safety & independence with functional mobility.  Reinforced use of call light to call for assistance before getting OOB.  Will continue to follow.     Plan    Treatment Plan Status: Continue Current Treatment Plan  Type of Treatment: Bed Mobility, Gait Training, Neuro Re-Education / Balance, Stair Training, Therapeutic Activities, Therapeutic Exercise  Treatment Frequency: 3 Times per Week  Treatment Duration: Until Therapy Goals Met    DC Equipment Recommendations: Unable to determine at this time  Discharge Recommendations: Recommend post-acute placement for additional physical therapy services prior to discharge home     Objective     07/17/23 1130   Precautions   Precautions Fall Risk;Cardiac Precautions (See Comments)   Pain 0 - 10 Group   Therapist Pain Assessment Post Activity Pain Same as Prior to Activity;Nurse Notified   Cognition    Cognition / Consciousness X   Speech/ Communication Delayed Responses   Level of Consciousness Alert   Ability To Follow Commands 1 Step   New Learning Impaired   Attention Impaired   Sequencing Impaired   Comments Pleasant & cooperative   Balance   Sitting Balance (Static) Fair   Sitting Balance (Dynamic) Fair -   Standing Balance (Static) Fair -   Standing Balance (Dynamic) Poor +   Weight Shift Sitting Fair   Weight Shift Standing Poor    Skilled Intervention Verbal Cuing;Tactile Cuing;Compensatory Strategies   Bed Mobility    Supine to Sit (SPV, pt got up to EOB without calling for assistance, setting off bed alarm)   Sit to Supine Standby Assist   Skilled Intervention Verbal Cuing   Comments HOB elevated   Gait Analysis   Gait Level Of Assist Minimal Assist   Assistive Device Front Wheel Walker   Distance (Feet) 8   # of Times Distance was Traveled 2   Deviation Shuffled Gait (poor FWW sequencing)   Weight Bearing Status No restrictions   Skilled Intervention Verbal Cuing;Compensatory Strategies   Functional Mobility   Sit to Stand Minimal Assist   Bed, Chair, Wheelchair Transfer Minimal Assist   Toilet Transfers Minimal Assist   Transfer Method Stand Step   Mobility ambulation to/from bathroom   Skilled Intervention Verbal Cuing;Tactile Cuing   Activity Tolerance   Sitting Edge of Bed 5 min   Standing 3 min total   Short Term Goals    Short Term Goal # 1 Pt will perform bed mobility with supervision in 6 visits to improve functional indep.   Goal Outcome # 1 Progressing as expected   Short Term Goal # 2 Pt will transfer with supervision in 6 visits to imrpove funcitonal indep   Goal Outcome # 2 Progressing as expected   Short Term Goal # 3 Pt will ambulate x 75 feet using fWW with cg assist in 6 viists to improve functiona indep.   Goal Outcome # 3 Goal not met   Physical Therapy Treatment Plan   Physical Therapy Treatment Plan Continue Current Treatment Plan

## 2023-07-17 NOTE — PROGRESS NOTES
Bedside report received, assumed pt care@5220. Pt A&Ox4. Pt denies any pain. POC discussed with pt and , pt verbalized understanding. Bed in lowest position with bed alarm on. Call light within reach.

## 2023-08-03 ENCOUNTER — TELEPHONE (OUTPATIENT)
Dept: CARDIOLOGY | Facility: MEDICAL CENTER | Age: 88
End: 2023-08-03
Payer: MEDICARE

## 2023-08-03 DIAGNOSIS — I25.10 CORONARY ARTERY DISEASE INVOLVING NATIVE CORONARY ARTERY OF NATIVE HEART WITHOUT ANGINA PECTORIS: Chronic | ICD-10-CM

## 2023-08-03 DIAGNOSIS — I82.409 ACUTE THROMBOEMBOLISM OF DEEP VEINS OF LOWER EXTREMITY, UNSPECIFIED LATERALITY (HCC): Chronic | ICD-10-CM

## 2023-08-03 DIAGNOSIS — I21.4 NSTEMI (NON-ST ELEVATED MYOCARDIAL INFARCTION) (HCC): ICD-10-CM

## 2023-08-03 DIAGNOSIS — R07.89 OTHER CHEST PAIN: ICD-10-CM

## 2023-08-03 NOTE — TELEPHONE ENCOUNTER
SARA    Caller: Augustin Fernando (Spouse)    Topic/issue: They would like for all of these medications sent to [facility] and not the [facility] that they are currently at.     Callback number: [phone]    Thank you,  -Angle EPPS

## 2023-08-03 NOTE — TELEPHONE ENCOUNTER
Called patient and unable to leave VM at this time.     All Rx's have active orders per chart, has f/u appt with DB. Will refill Rx's at that appt.

## 2023-08-03 NOTE — TELEPHONE ENCOUNTER
SARA      Caller: Fernando(pt's )  Medication Name and Dosage: apixaban (ELIQUIS) 5mg Tab, clopidogrel (PLAVIX) 75 MG Tab, ranolazine (RANEXA) 500 MG TABLET SR 12 HR, isosorbide mononitrate SR (IMDUR) 30 MG TABLET SR 24 HR, lisinopril (PRINIVIL) 20 MG Tab, rosuvastatin (CRESTOR) 20 MG Tab, nitroglycerin (NITROSTAT) 0.4 MG SL Tab     Please call your pharmacy and have them send us a refill request or speak to a live representative, RX number may have changed.    Medication amount left: less then 2 weeks left    Preferred Pharmacy: [facility] - [place], [place]  [address]    Other questions (Topic): n/a    Callback Number (Will only call for issues): [phone]    Thank you    -Tesfaye EPPS

## 2023-08-03 NOTE — TELEPHONE ENCOUNTER
JI - Patient and pt  is wanting to switch to [facility]. Okay to place 90 day supply of Rx listed below?

## 2023-08-06 NOTE — PROCEDURES
Procedure Performed: SILVA [facility]  PEDIATRIC PROBE used for the SILVA  Diltiazem and nitroglycerin drip stopped before starting the procedure    Procedure physician: William Cancino MD, MPH  Assistant: None    CONSENT: I have discussed the risks and benefits of electrical cardioversion as well as the procedure itself, rationale and appropriateness in detail with the patient today. Complications including but not limited to death, stroke, MI, ACLS, aspiration and complications related to anesthesia were explained to the patient. The potential outcomes associated with the procedure were also discussed at length. The patient agrees to proceed.    Pre-procedure diagnosis/Indication: Atrial flutter  Post-procedure diagnosis: Junctional rhythm followed by sinus rhythm    Procedure description: After confirmation of therapeutic anticoagulation and obtaining informed consent, the patient was deeply sedated with propofol by my anesthesia colleague. Thrombus in the left atrial appendage was excluded with SILVA. I then delivered a synchronized 200 joule biphasic cardioversion pulse in the anterior-posterior vector which  successfully cardioverted her out of atrial flutter. The patient awoke from sedation without complication.     Specimens: None  EBL: None  Complications: None    Impression  1. Successful SILVA-cardioversion of atrial flutter to sinus rhythm  2.  See separate SILVA report (No GRIFFIN thrombus, unable to obtain aortic valve gradients however appears to be stenotic)    William Cancino MD, MPH [facility]  Interventional Cardiologist  [facility]    3

## 2023-08-09 RX ORDER — ISOSORBIDE MONONITRATE 30 MG/1
30 TABLET, EXTENDED RELEASE ORAL DAILY
Qty: 90 TABLET | Refills: 0 | Status: SHIPPED | OUTPATIENT
Start: 2023-08-09 | End: 2023-08-14

## 2023-08-09 RX ORDER — LISINOPRIL 20 MG/1
20 TABLET ORAL DAILY
Qty: 90 TABLET | Refills: 0 | Status: SHIPPED | OUTPATIENT
Start: 2023-08-09 | End: 2023-08-14 | Stop reason: SDUPTHER

## 2023-08-09 RX ORDER — CLOPIDOGREL BISULFATE 75 MG/1
75 TABLET ORAL DAILY
Qty: 90 TABLET | Refills: 0 | Status: SHIPPED | OUTPATIENT
Start: 2023-08-09 | End: 2023-08-14 | Stop reason: SDUPTHER

## 2023-08-09 RX ORDER — NITROGLYCERIN 0.4 MG/1
TABLET SUBLINGUAL
Qty: 50 TABLET | Refills: 2 | Status: SHIPPED | OUTPATIENT
Start: 2023-08-09 | End: 2023-08-14 | Stop reason: SDUPTHER

## 2023-08-09 RX ORDER — ROSUVASTATIN CALCIUM 20 MG/1
20 TABLET, COATED ORAL EVERY EVENING
Qty: 90 TABLET | Refills: 0 | Status: SHIPPED | OUTPATIENT
Start: 2023-08-09 | End: 2023-08-14 | Stop reason: SDUPTHER

## 2023-08-09 RX ORDER — RANOLAZINE 500 MG/1
500 TABLET, EXTENDED RELEASE ORAL 2 TIMES DAILY
Qty: 180 TABLET | Refills: 0 | Status: SHIPPED | OUTPATIENT
Start: 2023-08-09 | End: 2023-08-14 | Stop reason: SDUPTHER

## 2023-08-10 NOTE — PROGRESS NOTES
"Chief Complaint   Patient presents with    Chest Pain    Coronary Artery Disease    MI (Non ST Segment Elevation MI)       Subjective     Nancy King is a 88 y.o. female who presents today for follow-up after recent hospitalization.  Patient presented to [facility] with chest pain and nausea and noted to be in A-fib RVR, transferred to [facility] for additional workup.    Patient has a medical history significant for T2DM, breast CVA, distant DVT/PE s/p IVC, dyslipidemia, HTN, hypothyroidism, atrial fibrillation, OAC with warfarin.  Last seen in clinic by Dr. Obrien on 6/28/2022.  During her hospitalization patient was noted to have an NSTEMI, underwent left heart cath which showed multivessel disease which was not amenable to PCI.  Cardiac thoracic surgery was consulted they did not recommend bypass surgery given patient's frail condition.  Overall recommendation was for medical management of symptoms.    Today in the office patient is accompanied by her son and her , brought in by wheelchair.  She is able to transfer and walk around around her mobile home but is much weaker than her normal following her recent hospitalization.  She does complain of chest pain severe enough to wake her at night.  Has not had as many episodes of chest pain during the day after starting Imdur however still has chest pain at night.  Twice since her hospitalization she has required nitroglycerin and chest pain has resolved.  Son is at the bedside and says that patient has complained of this chest pain for a number of years even prior to her recent hospitalization.  Does have some mild swelling in her bilateral legs, not pitting edema, resolves with elevation of legs.      Past Medical History:   Diagnosis Date    Acute venous embolism and thrombosis of unspecified deep vessels of lower extremity     Angina pectoris 5/11/2012    \"with stress\"    Benign essential HTN 5/11/2012    CAD (coronary artery disease) 5/11/2012    " "Cancer (HCC)     right breast    Chronic anticoagulation 5/11/2012    Heart burn     Hyperlipidemia 5/11/2012    Hyperlipoproteinemia 5/11/2012    Indigestion     Obesity 5/11/2012    Other specified disorder of intestines     diarrhea    Pulmonary embolism (HCC)     Unspecified cataract     removed bilat    Unspecified disorder of thyroid     Unspecified urinary incontinence      Past Surgical History:   Procedure Laterality Date    MASTECTOMY  1/12/2015    Performed by Dwight Birch M.D. at SURGERY [facility]S    NODE BIOPSY SENTINEL  1/12/2015    Performed by Dwight Birch M.D. at SURGERY [facility] ORS    AXILLARY NODE DISSECTION  1/12/2015    Performed by Dwight Birch M.D. at SURGERY [facility]S    APPENDECTOMY      CHOLECYSTECTOMY      HYSTERECTOMY, TOTAL ABDOMINAL      OTHER      Bladder    TONSILLECTOMY AND ADENOIDECTOMY       Family History   Problem Relation Age of Onset    Heart Disease Mother     Heart Disease Father      Social History     Socioeconomic History    Marital status:      Spouse name: Not on file    Number of children: Not on file    Years of education: Not on file    Highest education level: Not on file   Occupational History    Not on file   Tobacco Use    Smoking status: Never    Smokeless tobacco: Never   Vaping Use    Vaping Use: Never used   Substance and Sexual Activity    Alcohol use: Yes     Comment: \"Couple drinks every night\"    Drug use: No    Sexual activity: Not on file   Other Topics Concern    Not on file   Social History Narrative    Not on file     Social Determinants of Health     Financial Resource Strain: Not on file   Food Insecurity: Not on file   Transportation Needs: Not on file   Physical Activity: Not on file   Stress: Not on file   Social Connections: Not on file   Intimate Partner Violence: Not on file   Housing Stability: Not on file     Allergies   Allergen Reactions    Sulfa Drugs      Swelling    Levofloxacin Rash     Rash   "     Outpatient Encounter Medications as of 8/14/2023   Medication Sig Dispense Refill    isosorbide mononitrate SR (IMDUR) 60 MG TABLET SR 24 HR Take 1 Tablet by mouth every morning. 90 Tablet 3    apixaban (ELIQUIS) 5mg Tab Take 1 Tablet by mouth 2 times a day. Indications: Thromboembolism secondary to Atrial Fibrillation 180 Tablet 3    clopidogrel (PLAVIX) 75 MG Tab Take 1 Tablet by mouth every day. 90 Tablet 3    SITagliptin (JANUVIA) 100 MG Tab Take 1 Tablet by mouth every day. 90 Tablet 3    lisinopril (PRINIVIL) 20 MG Tab Take 1 Tablet by mouth every day. 90 Tablet 3    metoprolol SR (TOPROL XL) 50 MG TABLET SR 24 HR Take 1 Tablet by mouth every day. 90 Tablet 3    nitroglycerin (NITROSTAT) 0.4 MG SL Tab DISSOLVE 1 TABLET UNDER THE TONGUE EVERY 5 MINUTES AS  NEEDED FOR CHEST PAIN. MAX  OF 3 TABLETS IN 15 MINUTES. CALL 911 IF PAIN PERSISTS. 100 Tablet 5    ranolazine (RANEXA) 500 MG TABLET SR 12 HR Take 1 Tablet by mouth 2 times a day. 180 Tablet 3    rosuvastatin (CRESTOR) 20 MG Tab Take 1 Tablet by mouth every evening. 90 Tablet 3    omeprazole (PRILOSEC) 40 MG delayed-release capsule Take 1 Capsule by mouth every day. 30 Capsule 0    levothyroxine (SYNTHROID) 50 MCG Tab Take 50 mcg by mouth 2 times a day.      acetaminophen (TYLENOL 8 HOUR ARTHRITIS PAIN) 650 MG CR tablet Take 1,300 mg by mouth every 6 hours as needed for Moderate Pain.      B Complex Vitamins (VITAMIN B COMPLEX) Tab Take 1 Tablet by mouth every morning.      [DISCONTINUED] metoprolol SR (TOPROL XL) 50 MG TABLET SR 24 HR Take 1 Tablet by mouth every day. 90 Tablet 3    [DISCONTINUED] apixaban (ELIQUIS) 5mg Tab Take 1 Tablet by mouth 2 times a day. Indications: Thromboembolism secondary to Atrial Fibrillation 180 Tablet 0    [DISCONTINUED] clopidogrel (PLAVIX) 75 MG Tab Take 1 Tablet by mouth every day. 90 Tablet 0    [DISCONTINUED] isosorbide mononitrate SR (IMDUR) 30 MG TABLET SR 24 HR Take 1 Tablet by mouth every day. 90 Tablet 0     "[DISCONTINUED] lisinopril (PRINIVIL) 20 MG Tab Take 1 Tablet by mouth every day. 90 Tablet 0    [DISCONTINUED] nitroglycerin (NITROSTAT) 0.4 MG SL Tab DISSOLVE 1 TABLET UNDER THE TONGUE EVERY 5 MINUTES AS  NEEDED FOR CHEST PAIN. MAX  OF 3 TABLETS IN 15 MINUTES. CALL 911 IF PAIN PERSISTS. 50 Tablet 2    [DISCONTINUED] ranolazine (RANEXA) 500 MG TABLET SR 12 HR Take 1 Tablet by mouth 2 times a day. 180 Tablet 0    [DISCONTINUED] rosuvastatin (CRESTOR) 20 MG Tab Take 1 Tablet by mouth every evening. 90 Tablet 0    [DISCONTINUED] metoprolol SR (TOPROL XL) 25 MG TABLET SR 24 HR Take 1 Tablet by mouth every day. 30 Tablet 1    [DISCONTINUED] aspirin 81 MG EC tablet Take 1 Tablet by mouth every day. 100 Tablet 0    [DISCONTINUED] JANUVIA 100 MG Tab TAKE 1 TABLET BY MOUTH  DAILY 90 Tablet 3     No facility-administered encounter medications on file as of 8/14/2023.     Review of Systems   Constitutional:  Positive for malaise/fatigue.   Respiratory:  Negative for shortness of breath.    Cardiovascular:  Positive for chest pain and leg swelling. Negative for palpitations and orthopnea.   Musculoskeletal:  Positive for back pain.   Neurological:  Positive for weakness. Negative for dizziness and loss of consciousness.   Endo/Heme/Allergies:  Polydipsia: -continue aspirin, plavix, doac for one month and thereafter doac and [id number] antianginals.-BB and ACEi as tolerated.   All other systems reviewed and are negative.             Objective     BP (!) 160/80 (BP Location: Left arm, Patient Position: Sitting, BP Cuff Size: Adult)   Pulse 88   Resp 16   Ht 1.651 m (5' 5\")   Wt 74.8 kg (165 lb)   SpO2 90%   BMI 27.46 kg/m²     Physical Exam  Vitals reviewed.   Constitutional:       General: She is not in acute distress.     Comments: Somewhat fail looking older woman in wheelchair   Cardiovascular:      Rate and Rhythm: Normal rate and regular rhythm.      Heart sounds: No murmur heard.  Pulmonary:      Effort: " Pulmonary effort is normal. No respiratory distress.      Breath sounds: Normal breath sounds. No rhonchi.   Abdominal:      General: There is no distension.      Tenderness: There is no abdominal tenderness.   Musculoskeletal:      Cervical back: Normal range of motion.      Right lower leg: No edema.      Left lower leg: No edema.   Neurological:      General: No focal deficit present.      Mental Status: She is alert.            Lab Results   Component Value Date/Time    CHOLSTRLTOT 126 07/13/2023 01:42 AM    LDL 66 07/13/2023 01:42 AM    HDL 40 07/13/2023 01:42 AM    TRIGLYCERIDE 100 07/13/2023 01:42 AM       Lab Results   Component Value Date/Time    SODIUM 133 (L) 07/17/2023 01:50 AM    POTASSIUM 4.4 07/17/2023 01:50 AM    CHLORIDE 99 07/17/2023 01:50 AM    CO2 20 07/17/2023 01:50 AM    GLUCOSE 117 (H) 07/17/2023 01:50 AM    BUN 17 07/17/2023 01:50 AM    CREATININE 0.73 07/17/2023 01:50 AM     Lab Results   Component Value Date/Time    ALKPHOSPHAT 143 (H) 07/12/2023 06:50 AM    ASTSGOT 32 07/12/2023 06:50 AM    ALTSGPT 15 07/12/2023 06:50 AM    TBILIRUBIN 0.6 07/12/2023 06:50 AM      Coronary angiography 7/13/2023 Dr. Cancino  IMPRESSION:  1.  Severe multivessel calcified CAD including subtotally occluded mid LAD, not very optimal for PCI given the anatomy of the severe stenoses and calcifications.  2.  Normal resting LVEDP 11 mmHg   3.  Mild-moderate AS   RECOMMENDATIONS:  Appreciate CT surgical consultation for CABG   Resume heparin drip  Resume nitroglycerin drip per primary team  Multidisciplinary discussions involving consulting cardiology, primary team, interventional cardiology and cardiothoracic surgery for optimal approach for multivessel severe disease.  I recommend either surgical consultation for CABG or optimal medical therapy rather than PCI given her anatomy.  TR band protocol  Ongoing secondary ASCVD prevention    CTA OF CHEST (01/30/15)  1.  Extensive pulmonary thromboembolism involving  majority of right pulmonary arteries and significant portions of left pulmonary arteries  2.  No other significant finding  3.  Right breast postoperative changes  4.  Aortic atherosclerotic plaque  5.  Infiltration of liver     Echocardiogram CONCLUSIONS 7/13/2023  Left ventricular systolic function is normal.  Calcified aortic valve leaflets with moderately reduced motion.  Mild-Moderate aortic valve stenosis.  Transvalvular gradients are - Peak: 25 mmHg, Mean: 13 mmHg.    ECHOCARDIOGRAM CONCLUSIONS (06/23/22)  Prior echo on 06/11/18, compared to the report of the prior study,   there has been development of mild aortic stenosis.  Normal left ventricular systolic function.   The left ventricular ejection fraction is visually estimated to be 60%.  Mild mitral regurgitation.  Mild aortic valve stenosis.  Vmax is 2.1 m/s. Transvalvular gradients are - Peak:  17 mmHg, Mean: 10   mmHg.   Mild tricuspid regurgitation.  Estimated right ventricular systolic pressure is 25 mmHg.  (study result reviewed)     ECHOCARDIOGRAM CONCLUSIONS (06/11/18)  Prior echo done 02/01/15. Compared to the report of the study done -   there has been no significant change.   Normal left ventricular systolic function.  Left ventricular ejection fraction is visually estimated to be 60%.  Grade I diastolic dysfunction.  Mild mitral regurgitation.  Mild tricuspid regurgitation.  Estimated right ventricular systolic pressure is 25 mmHg.    Assessment & Plan     1. NSTEMI (non-ST elevated myocardial infarction) (HCC)  isosorbide mononitrate SR (IMDUR) 60 MG TABLET SR 24 HR    Basic Metabolic Panel    clopidogrel (PLAVIX) 75 MG Tab    lisinopril (PRINIVIL) 20 MG Tab    metoprolol SR (TOPROL XL) 50 MG TABLET SR 24 HR    ranolazine (RANEXA) 500 MG TABLET SR 12 HR    rosuvastatin (CRESTOR) 20 MG Tab    DISCONTINUED: metoprolol SR (TOPROL XL) 50 MG TABLET SR 24 HR      2. Hypertensive emergency        3. S/P IVC filter        4. Type 2 diabetes mellitus  without complication, without long-term current use of insulin (HCC)        5. Acute thromboembolism of deep veins of lower extremity, unspecified laterality (HCC)  apixaban (ELIQUIS) 5mg Tab      6. Dyslipidemia  SITagliptin (JANUVIA) 100 MG Tab      7. Coronary artery disease involving native coronary artery of native heart without angina pectoris  SITagliptin (JANUVIA) 100 MG Tab    nitroglycerin (NITROSTAT) 0.4 MG SL Tab      8. Hyperlipoproteinemia  SITagliptin (JANUVIA) 100 MG Tab      9. Other chest pain  nitroglycerin (NITROSTAT) 0.4 MG SL Tab      10. Paroxysmal atrial fibrillation (HCC)        11. Angina at rest (HCC)        12. Primary hypertension        13. Secondary hypercoagulable state (HCC)        14. Atrial flutter with rapid ventricular response (HCC)        15. Abnormal myocardial perfusion study            Medical Decision Making: Today's Assessment/Status/Plan:        CAD/NSTEMI (not amenable to PCI, not surgical candidate) underwent left heart cath on 7/13/2023 which showed multivessel complex CAD.  Patient placed on medical therapy, continues to have breakthrough episodes of chest pain.  -Continue Ranexa 500 mg twice a day  -Will increase Imdur from 30 mg to 60 mg, if chest pain persists would consider increasing dose or splitting dose to a.m./p.m.  -Initially on triple therapy with Eliquis Plavix and aspirin patient can stop aspirin on the 17th of this month and continue Eliquis and Plavix  -Currently on lisinopril 20 mg, will recheck BMP to assess if patient is tolerating  -Metoprolol XL increased from 25 mg to 50 mg  -Continue as needed nitroglycerin for breakthrough angina    Primary hypertension.  Blood pressures from home and in the office are elevated increasing Imdur and metoprolol we will continue to monitor blood pressures at home.  If blood pressure remains elevated could increase lisinopril.    Hyperlipidemia.  Tolerating Crestor continue.    Atrial fibrillation with RVR.  Patient  had episode of atrial fibrillation in hospital requiring cardioversion.  Continue Eliquis for anticoagulation.  On metoprolol.  Rate is currently controlled no further interventions at this time.    Follow-up in 1 month, virtual appointment is marco Andersen, MSN, APRISIS  [facility]  [phone]    Please note this dictation was created using voice recognition software.  I have made every reasonable attempt to correct obvious errors, but there may be errors of grammar and possibly content that I did not discover before finalizing the note.

## 2023-08-14 ENCOUNTER — OFFICE VISIT (OUTPATIENT)
Dept: CARDIOLOGY | Facility: MEDICAL CENTER | Age: 88
End: 2023-08-14
Attending: NURSE PRACTITIONER
Payer: MEDICARE

## 2023-08-14 VITALS
OXYGEN SATURATION: 90 % | HEIGHT: 65 IN | BODY MASS INDEX: 27.49 KG/M2 | HEART RATE: 88 BPM | RESPIRATION RATE: 16 BRPM | DIASTOLIC BLOOD PRESSURE: 80 MMHG | SYSTOLIC BLOOD PRESSURE: 160 MMHG | WEIGHT: 165 LBS

## 2023-08-14 DIAGNOSIS — I10 PRIMARY HYPERTENSION: ICD-10-CM

## 2023-08-14 DIAGNOSIS — I48.92 ATRIAL FLUTTER WITH RAPID VENTRICULAR RESPONSE (HCC): ICD-10-CM

## 2023-08-14 DIAGNOSIS — D68.69 SECONDARY HYPERCOAGULABLE STATE (HCC): ICD-10-CM

## 2023-08-14 DIAGNOSIS — I20.89 ANGINA AT REST (HCC): ICD-10-CM

## 2023-08-14 DIAGNOSIS — E11.9 TYPE 2 DIABETES MELLITUS WITHOUT COMPLICATION, WITHOUT LONG-TERM CURRENT USE OF INSULIN (HCC): ICD-10-CM

## 2023-08-14 DIAGNOSIS — E78.5 DYSLIPIDEMIA: ICD-10-CM

## 2023-08-14 DIAGNOSIS — Z95.828 S/P IVC FILTER: ICD-10-CM

## 2023-08-14 DIAGNOSIS — I21.4 NSTEMI (NON-ST ELEVATED MYOCARDIAL INFARCTION) (HCC): ICD-10-CM

## 2023-08-14 DIAGNOSIS — E78.5 HYPERLIPOPROTEINEMIA: ICD-10-CM

## 2023-08-14 DIAGNOSIS — I25.10 CORONARY ARTERY DISEASE INVOLVING NATIVE CORONARY ARTERY OF NATIVE HEART WITHOUT ANGINA PECTORIS: ICD-10-CM

## 2023-08-14 DIAGNOSIS — R94.39 ABNORMAL MYOCARDIAL PERFUSION STUDY: ICD-10-CM

## 2023-08-14 DIAGNOSIS — I16.1 HYPERTENSIVE EMERGENCY: ICD-10-CM

## 2023-08-14 DIAGNOSIS — I82.409 ACUTE THROMBOEMBOLISM OF DEEP VEINS OF LOWER EXTREMITY, UNSPECIFIED LATERALITY (HCC): Chronic | ICD-10-CM

## 2023-08-14 DIAGNOSIS — I48.0 PAROXYSMAL ATRIAL FIBRILLATION (HCC): ICD-10-CM

## 2023-08-14 DIAGNOSIS — R07.89 OTHER CHEST PAIN: ICD-10-CM

## 2023-08-14 PROCEDURE — 3079F DIAST BP 80-89 MM HG: CPT | Performed by: NURSE PRACTITIONER

## 2023-08-14 PROCEDURE — 3077F SYST BP >= 140 MM HG: CPT | Performed by: NURSE PRACTITIONER

## 2023-08-14 PROCEDURE — 99213 OFFICE O/P EST LOW 20 MIN: CPT | Performed by: NURSE PRACTITIONER

## 2023-08-14 PROCEDURE — 99214 OFFICE O/P EST MOD 30 MIN: CPT | Performed by: NURSE PRACTITIONER

## 2023-08-14 RX ORDER — CLOPIDOGREL BISULFATE 75 MG/1
75 TABLET ORAL DAILY
Qty: 90 TABLET | Refills: 3 | Status: SHIPPED | OUTPATIENT
Start: 2023-08-14

## 2023-08-14 RX ORDER — LISINOPRIL 20 MG/1
20 TABLET ORAL DAILY
Qty: 90 TABLET | Refills: 3 | Status: SHIPPED | OUTPATIENT
Start: 2023-08-14

## 2023-08-14 RX ORDER — ROSUVASTATIN CALCIUM 20 MG/1
20 TABLET, COATED ORAL EVERY EVENING
Qty: 90 TABLET | Refills: 3 | Status: SHIPPED | OUTPATIENT
Start: 2023-08-14

## 2023-08-14 RX ORDER — METOPROLOL SUCCINATE 50 MG/1
50 TABLET, EXTENDED RELEASE ORAL DAILY
Qty: 90 TABLET | Refills: 3 | Status: SHIPPED | OUTPATIENT
Start: 2023-08-14 | End: 2023-08-14 | Stop reason: SDUPTHER

## 2023-08-14 RX ORDER — ISOSORBIDE MONONITRATE 60 MG/1
60 TABLET, EXTENDED RELEASE ORAL EVERY MORNING
Qty: 90 TABLET | Refills: 3 | Status: SHIPPED | OUTPATIENT
Start: 2023-08-14

## 2023-08-14 RX ORDER — RANOLAZINE 500 MG/1
500 TABLET, EXTENDED RELEASE ORAL 2 TIMES DAILY
Qty: 180 TABLET | Refills: 3 | Status: SHIPPED | OUTPATIENT
Start: 2023-08-14

## 2023-08-14 RX ORDER — METOPROLOL SUCCINATE 50 MG/1
50 TABLET, EXTENDED RELEASE ORAL DAILY
Qty: 90 TABLET | Refills: 3 | Status: SHIPPED | OUTPATIENT
Start: 2023-08-14

## 2023-08-14 RX ORDER — NITROGLYCERIN 0.4 MG/1
TABLET SUBLINGUAL
Qty: 100 TABLET | Refills: 5 | Status: SHIPPED | OUTPATIENT
Start: 2023-08-14

## 2023-08-14 ASSESSMENT — FIBROSIS 4 INDEX: FIB4 SCORE: 3.76

## 2023-08-14 ASSESSMENT — ENCOUNTER SYMPTOMS
PALPITATIONS: 0
WEAKNESS: 1
BACK PAIN: 1
SHORTNESS OF BREATH: 0
ORTHOPNEA: 0
LOSS OF CONSCIOUSNESS: 0
DIZZINESS: 0

## 2023-08-14 NOTE — PATIENT INSTRUCTIONS
Increase IMDUR to 60 mg  Increase TOPROL XL to 50 MG    Keep track of Heart rate and blood pressures

## 2023-08-22 ENCOUNTER — TELEPHONE (OUTPATIENT)
Dept: CARDIOLOGY | Facility: MEDICAL CENTER | Age: 88
End: 2023-08-22
Payer: MEDICARE

## 2023-08-22 DIAGNOSIS — I48.0 PAROXYSMAL ATRIAL FIBRILLATION (HCC): ICD-10-CM

## 2023-08-22 DIAGNOSIS — I82.409 ACUTE THROMBOEMBOLISM OF DEEP VEINS OF LOWER EXTREMITY, UNSPECIFIED LATERALITY (HCC): ICD-10-CM

## 2023-08-22 NOTE — TELEPHONE ENCOUNTER
DB        Caller: Augustin (pt's )    Medication Name and Dosage: apixaban (ELIQUIS) 5mg Tab    Please call your pharmacy and have them send us a refill request or speak to a live representative, RX number may have changed.    Medication amount left: 3 days    Preferred Pharmacy: [facility] [address] [place] [address]    Other questions (Topic): n/a    Callback Number (Will only call for issues): [phone]      Thank you    -Tesfaye EPPS

## 2023-08-22 NOTE — TELEPHONE ENCOUNTER
Short RX sent to [facility] for 14day supply. [facility] supply is sent to pt.     Called pt to update on same.

## 2023-09-07 ENCOUNTER — TELEPHONE (OUTPATIENT)
Dept: CARDIOLOGY | Facility: MEDICAL CENTER | Age: 88
End: 2023-09-07
Payer: MEDICARE

## 2023-09-07 DIAGNOSIS — I48.0 PAROXYSMAL ATRIAL FIBRILLATION (HCC): ICD-10-CM

## 2023-09-07 DIAGNOSIS — I82.409 ACUTE THROMBOEMBOLISM OF DEEP VEINS OF LOWER EXTREMITY, UNSPECIFIED LATERALITY (HCC): ICD-10-CM

## 2023-09-07 NOTE — TELEPHONE ENCOUNTER
Phone Number Called: [phone]    Call outcome:  Spoke to Nancy's  Iggy    Message: Called to inform patient that a short Rx was sent to [facility] in [place].

## 2023-09-07 NOTE — TELEPHONE ENCOUNTER
DB    Caller: Augustin King (Spouse)    Topic/issue: Patient's prescription for apixaban (ELIQUIS) 5mg Tab is really having a hard time getting it through [facility] and if it is not delivered by Saturday, should she double down on clopidogrel (PLAVIX) 75 MG Tab to help balance thing out? Should she go back on the aspirin 81 MG EC tablet instead? Or can another short supply of apixaban (ELIQUIS) 5mg Tab be sent to the local [facility] in [place].    Callback Number: [phone]    Thank you,  -Angle EPPS

## 2023-09-08 ENCOUNTER — TELEPHONE (OUTPATIENT)
Dept: CARDIOLOGY | Facility: MEDICAL CENTER | Age: 88
End: 2023-09-08
Payer: MEDICARE

## 2023-09-08 NOTE — TELEPHONE ENCOUNTER
Called pt in regards to lab work that was ordered at previous OV. Patient states That they will be getting lab work done next Tuesday. Pt has follow up appointment scheduled with DB on 9/20/2023.

## 2023-09-09 DIAGNOSIS — I82.409 ACUTE THROMBOEMBOLISM OF DEEP VEINS OF LOWER EXTREMITY, UNSPECIFIED LATERALITY (HCC): ICD-10-CM

## 2023-09-09 DIAGNOSIS — I48.0 PAROXYSMAL ATRIAL FIBRILLATION (HCC): ICD-10-CM

## 2023-09-11 RX ORDER — APIXABAN 5 MG/1
5 TABLET, FILM COATED ORAL 2 TIMES DAILY
Qty: 28 TABLET | Refills: 0 | OUTPATIENT
Start: 2023-09-11

## 2023-09-12 NOTE — TELEPHONE ENCOUNTER
14 day prescription was sent on 9/7/23 to cover delivery by [facility]. Patient has follow up scheduled on 9/20.

## 2023-09-14 NOTE — TELEPHONE ENCOUNTER
SAMANTHA     Caller: Augustin- patients spouse     Topic/issue: Augustin would like to inform the office that the patient completed her blood work at [facility] on 9/12/23. Please advise.     Callback Number: [phone] (home)     Thank you,   Precious SHEA

## 2023-09-18 DIAGNOSIS — I21.4 NSTEMI (NON-ST ELEVATED MYOCARDIAL INFARCTION) (HCC): ICD-10-CM

## 2023-09-20 ENCOUNTER — DOCUMENTATION (OUTPATIENT)
Dept: VASCULAR LAB | Facility: MEDICAL CENTER | Age: 88
End: 2023-09-20
Payer: MEDICARE

## 2023-09-20 ENCOUNTER — ANTICOAGULATION MONITORING (OUTPATIENT)
Dept: VASCULAR LAB | Facility: MEDICAL CENTER | Age: 88
End: 2023-09-20
Payer: MEDICARE

## 2023-09-20 ENCOUNTER — APPOINTMENT (OUTPATIENT)
Dept: CARDIOLOGY | Facility: MEDICAL CENTER | Age: 88
End: 2023-09-20
Attending: NURSE PRACTITIONER
Payer: MEDICARE

## 2023-09-20 DIAGNOSIS — Z86.711 HISTORY OF PULMONARY EMBOLISM: ICD-10-CM

## 2023-09-20 DIAGNOSIS — Z79.01 CHRONIC ANTICOAGULATION: Chronic | ICD-10-CM

## 2023-09-20 NOTE — PROGRESS NOTES
Per patient's . Anticoagulation is being managed by MD. Discharging patient from anticoagulation clinic.     Tresa MishraD

## 2023-10-03 NOTE — PROGRESS NOTES
"Chief Complaint   Patient presents with    MI (Non ST Segment Elevation MI)     Follow up           Subjective:   Nancy King is a 88 y.o. female who presents today for follow-up with her son and .    Patient of Dr. Obrien.  Current medical problems include atrial fibrillation anticoagulated with apixaban, dyslipidemia, hypertension, type 2 diabetes.    Nancy was hospitalized in July for NSTEMI which showed multivessel coronary artery disease which was not amenable to PCI.  Cardiothoracic surgery was consulted however they recommended against bypass surgery due to patient's frail condition.  Transthoracic echocardiogram performed during the admission showed normal left and right ventricular systolic function, mild to moderate AS she was discharged on medical therapy.      She followed up on 8/14/2023 at which point she was having stable angina symptoms, using nitroglycerin as needed.  Isosorbide was increased from 30 to 60 mg.    Since increasing isosorbide Nancy has not had any chest pressure.  She does not experience any lightheadedness or dizziness.  She is tolerating her medications well.  She does describe intermittent diarrhea/constipation problems.  She has no blood in her stool or dark tarry stool.    She has a fairly sedentary lifestyle that involves mostly light housework.  She does not do the grocery shopping.  She uses a walker or a wheelchair when outside of the house.  She has had no recent falls.  She endorses recent weight loss, unintentionally.    Past Medical History:   Diagnosis Date    Acute venous embolism and thrombosis of unspecified deep vessels of lower extremity     Angina pectoris 5/11/2012    \"with stress\"    Benign essential HTN 5/11/2012    CAD (coronary artery disease) 5/11/2012    Cancer (HCC)     right breast    Chronic anticoagulation 5/11/2012    Heart burn     Hyperlipidemia 5/11/2012    Hyperlipoproteinemia 5/11/2012    Indigestion     Obesity 5/11/2012    " Other specified disorder of intestines     diarrhea    Pulmonary embolism (HCC)     Unspecified cataract     removed bilat    Unspecified disorder of thyroid     Unspecified urinary incontinence          Family History   Problem Relation Age of Onset    Heart Disease Mother     Heart Disease Father          Social History     Tobacco Use    Smoking status: Never    Smokeless tobacco: Never   Vaping Use    Vaping Use: Never used   Substance Use Topics    Alcohol use: Yes     Comment: occ. rarely    Drug use: No         Allergies   Allergen Reactions    Sulfa Drugs      Swelling    Levofloxacin Rash     Rash           Current Outpatient Medications   Medication Sig    isosorbide mononitrate SR (IMDUR) 60 MG TABLET SR 24 HR Take 1 Tablet by mouth every morning.    apixaban (ELIQUIS) 5mg Tab Take 1 Tablet by mouth 2 times a day. Indications: Thromboembolism secondary to Atrial Fibrillation    clopidogrel (PLAVIX) 75 MG Tab Take 1 Tablet by mouth every day.    SITagliptin (JANUVIA) 100 MG Tab Take 1 Tablet by mouth every day.    lisinopril (PRINIVIL) 20 MG Tab Take 1 Tablet by mouth every day.    metoprolol SR (TOPROL XL) 50 MG TABLET SR 24 HR Take 1 Tablet by mouth every day.    nitroglycerin (NITROSTAT) 0.4 MG SL Tab DISSOLVE 1 TABLET UNDER THE TONGUE EVERY 5 MINUTES AS  NEEDED FOR CHEST PAIN. MAX  OF 3 TABLETS IN 15 MINUTES. CALL 911 IF PAIN PERSISTS.    ranolazine (RANEXA) 500 MG TABLET SR 12 HR Take 1 Tablet by mouth 2 times a day.    rosuvastatin (CRESTOR) 20 MG Tab Take 1 Tablet by mouth every evening.    omeprazole (PRILOSEC) 40 MG delayed-release capsule Take 1 Capsule by mouth every day.    acetaminophen (TYLENOL 8 HOUR ARTHRITIS PAIN) 650 MG CR tablet Take 1,300 mg by mouth every 6 hours as needed for Moderate Pain.    B Complex Vitamins (VITAMIN B COMPLEX) Tab Take 1 Tablet by mouth every morning.    levothyroxine (SYNTHROID) 50 MCG Tab Take 50 mcg by mouth 2 times a day.         Review of Systems  "  Constitutional:  Positive for weight loss.   Respiratory:  Negative for cough and shortness of breath.    Cardiovascular:  Positive for leg swelling. Negative for chest pain, palpitations, orthopnea and PND.   Gastrointestinal:  Positive for constipation and diarrhea. Negative for blood in stool and melena.   Musculoskeletal:  Negative for falls.   Neurological:  Negative for dizziness and loss of consciousness.          Objective:   /56 (BP Location: Left arm, Patient Position: Sitting)   Pulse 72   Resp 16   Ht 1.651 m (5' 5\")   Wt 63.5 kg (140 lb)   SpO2 95%  Body mass index is 23.3 kg/m².         Physical Exam  Vitals reviewed.   Constitutional:       Comments: Elderly frail female, arrives in wheelchair   HENT:      Head: Normocephalic and atraumatic.   Cardiovascular:      Rate and Rhythm: Normal rate and regular rhythm.      Heart sounds: Murmur heard.   Pulmonary:      Effort: Pulmonary effort is normal. No respiratory distress.      Breath sounds: No rales.   Abdominal:      General: There is no distension.   Musculoskeletal:      Comments: Trace/minimal edema in bilateral lower extremities   Skin:     General: Skin is warm and dry.   Neurological:      General: No focal deficit present.      Mental Status: She is alert.      Gait: Gait normal.   Psychiatric:         Judgment: Judgment normal.            Assessment:     1. NSTEMI (non-ST elevated myocardial infarction) (HCC)        2. Coronary artery disease of native artery of native heart with stable angina pectoris (HCC)        3. Secondary hypercoagulable state (HCC)        4. Paroxysmal atrial fibrillation (HCC)        5. Dyslipidemia             Medical Decision Making:  Today's Assessment / Plan:   CAD/NSTEMI (not amenable to PCI, not surgical candidate)   multivessel complex CAD, medically managed  - no angina symptoms since isosorbide increase to 60mg, continue  -Continue Ranexa 500 mg twice a day  -Metoprolol XL 50 mg  - cont " statin  -Initially on triple therapy with Eliquis Plavix and aspirin patient , aspirin dropped after 1 month. We had a long discussion about continuing the clopidogrel past 3 months. Given her frailty, history of falls I recommend 3mo then stopping, her  felt her risk of bleeding and falls is low and preferred 6mo then stop    -Continue as needed nitroglycerin for breakthrough angina     Primary hypertension, controlled  -Cont lisinopril 20 mg  - BMP from 9/14/23 reviewed and stable     Hyperlipidemia, controlled  Tolerating Crestor continue.     Atrial fibrillation , paroxysmal.   - Af with RVR in hospital requiring electrical cardioversion.  Continue Eliquis for anticoagulation.  On metoprolol.  Rate is currently controlled no further interventions at this time.  - monitor weights at home, if <60kg then reduce apixaban dose to 2.5 mg twice daily     End of life planning  - on review of hospital records she has not completed a POLST form in your advanced directive.  Given her nonrevascularizable heart disease her end-of-life wishes should be addressed at next visit    Return in about 6 months (around 4/4/2024) for with SAMANTHA or EDITH.

## 2023-10-04 ENCOUNTER — APPOINTMENT (OUTPATIENT)
Dept: CARDIOLOGY | Facility: MEDICAL CENTER | Age: 88
End: 2023-10-04
Attending: PHYSICIAN ASSISTANT
Payer: MEDICARE

## 2023-10-04 VITALS
RESPIRATION RATE: 16 BRPM | HEART RATE: 72 BPM | BODY MASS INDEX: 23.32 KG/M2 | DIASTOLIC BLOOD PRESSURE: 56 MMHG | SYSTOLIC BLOOD PRESSURE: 130 MMHG | HEIGHT: 65 IN | OXYGEN SATURATION: 95 % | WEIGHT: 140 LBS

## 2023-10-04 DIAGNOSIS — I25.118 CORONARY ARTERY DISEASE OF NATIVE ARTERY OF NATIVE HEART WITH STABLE ANGINA PECTORIS (HCC): ICD-10-CM

## 2023-10-04 DIAGNOSIS — I48.0 PAROXYSMAL ATRIAL FIBRILLATION (HCC): ICD-10-CM

## 2023-10-04 DIAGNOSIS — D68.69 SECONDARY HYPERCOAGULABLE STATE (HCC): ICD-10-CM

## 2023-10-04 DIAGNOSIS — I21.4 NSTEMI (NON-ST ELEVATED MYOCARDIAL INFARCTION) (HCC): ICD-10-CM

## 2023-10-04 DIAGNOSIS — E78.5 DYSLIPIDEMIA: ICD-10-CM

## 2023-10-04 PROCEDURE — 99214 OFFICE O/P EST MOD 30 MIN: CPT | Performed by: PHYSICIAN ASSISTANT

## 2023-10-04 PROCEDURE — 3075F SYST BP GE 130 - 139MM HG: CPT | Performed by: PHYSICIAN ASSISTANT

## 2023-10-04 PROCEDURE — 99213 OFFICE O/P EST LOW 20 MIN: CPT | Performed by: PHYSICIAN ASSISTANT

## 2023-10-04 PROCEDURE — 3078F DIAST BP <80 MM HG: CPT | Performed by: PHYSICIAN ASSISTANT

## 2023-10-04 ASSESSMENT — ENCOUNTER SYMPTOMS
PND: 0
DIARRHEA: 1
ORTHOPNEA: 0
WEIGHT LOSS: 1
BLOOD IN STOOL: 0
PALPITATIONS: 0
SHORTNESS OF BREATH: 0
DIZZINESS: 0
COUGH: 0
LOSS OF CONSCIOUSNESS: 0
CONSTIPATION: 1
FALLS: 0

## 2023-10-04 ASSESSMENT — FIBROSIS 4 INDEX: FIB4 SCORE: 3.76

## 2023-12-06 ENCOUNTER — TELEPHONE (OUTPATIENT)
Dept: CARDIOLOGY | Facility: MEDICAL CENTER | Age: 88
End: 2023-12-06
Payer: MEDICARE

## 2023-12-06 DIAGNOSIS — I82.409 ACUTE THROMBOEMBOLISM OF DEEP VEINS OF LOWER EXTREMITY, UNSPECIFIED LATERALITY (HCC): Chronic | ICD-10-CM

## 2023-12-06 NOTE — TELEPHONE ENCOUNTER
EDITH    Received request via: Patient    Was the patient seen in the last year in this department? Yes    Does the patient have an active prescription (recently filled or refills available) for medication(s) requested? Yes. Refill request has been refused in Epic. Contacted pharmacy and called in most recent prescription.    Does the patient have custodial Plus and need 100 day supply (blood pressure, diabetes and cholesterol meds only)? Patient does not have SCP

## 2023-12-06 NOTE — TELEPHONE ENCOUNTER
EDITH    Caller:  Augustin (spouse)    Medication Name and Dosage:   apixaban (ELIQUIS) 5mg Tab [[id number]]      Medication amount left: 1 week    Preferred Pharmacy:   [facility]    Other questions (Topic): [facility] sent several requests.     Callback Number (Will only call for issues): [phone]    Thank you,   Talisha MARINELLI

## 2024-04-22 ENCOUNTER — TELEPHONE (OUTPATIENT)
Dept: HEALTH INFORMATION MANAGEMENT | Facility: OTHER | Age: 89
End: 2024-04-22
Payer: MEDICARE

## 2024-04-22 NOTE — PROGRESS NOTES
Chief Complaint   Patient presents with    Congestive Heart Failure     F/V Dx: NSTEMI (non-ST elevated myocardial infarction) (HCC)    Chest Pain          Subjective:   Nancy King is a 88 y.o. female who presents today for follow-up with her daughter and .    Patient of Dr. Obrien.  Current medical problems include atrial fibrillation anticoagulated with apixaban, dyslipidemia, hypertension, type 2 diabetes.    Last visit was with me in October 2023. I saw her as a follow up for NSTEMI which showed multivessel coronary artery disease which was not amenable to PCI. Cardiothoracic surgery was consulted however they recommended against bypass surgery due to patient's frail condition.  Transthoracic echocardiogram performed during the admission showed normal left and right ventricular systolic function, mild to moderate AS she was discharged on medical therapy. Her anginal symptoms were controlled with medication.     She was hospitalized in [facility] 4/18/24 for chest pain. On admission she was in AF with RVR, she spontaneously converted to normal sinus rhythm.  While in normal rhythm her symptoms had resolved.  She was discharged on a loading dose of amiodarone.    She was then in the emergency department 4/21/2024 for weakness and fall at home.    Nancy returns with her family for hospital follow-up.  She recalls in the middle of the night she developed severe chest pain and as soon as the atrial fibrillation resolved her symptoms improved.  She still has a dry cough with some shortness of breath.  She is quite weak after the hospitalization and is requiring a wheelchair which is new from our last visit.    She continues on the Eliquis, although her and her  were unsure why she was on this despite our conversations last visit.    Prior to this hospitalization she had been doing quite well and was in her normal state of health, not limited by angina.  She does live a very sedentary  "lifestyle.  Past Medical History:   Diagnosis Date    Acute venous embolism and thrombosis of unspecified deep vessels of lower extremity     Angina pectoris 5/11/2012    \"with stress\"    Benign essential HTN 5/11/2012    CAD (coronary artery disease) 5/11/2012    Cancer (HCC)     right breast    Chronic anticoagulation 5/11/2012    Heart burn     Hyperlipidemia 5/11/2012    Hyperlipoproteinemia 5/11/2012    Indigestion     Obesity 5/11/2012    Other specified disorder of intestines     diarrhea    Pulmonary embolism (HCC)     Unspecified cataract     removed bilat    Unspecified disorder of thyroid     Unspecified urinary incontinence          Family History   Problem Relation Age of Onset    Heart Disease Mother     Heart Disease Father          Social History     Tobacco Use    Smoking status: Never    Smokeless tobacco: Never   Vaping Use    Vaping Use: Never used   Substance Use Topics    Alcohol use: Yes     Comment: occ. rarely    Drug use: No         Allergies   Allergen Reactions    Sulfa Drugs      Swelling    Levofloxacin Rash     Rash           Current Outpatient Medications   Medication Sig    amiodarone (CORDARONE) 200 MG Tab Take 1 Tablet by mouth every day.    furosemide (LASIX) 20 MG Tab Take 20 mg by mouth 1 time a day as needed (shortness of breath, cough, leg swelling).    apixaban (ELIQUIS) 5mg Tab Take 1 Tablet by mouth 2 times a day. Indications: Thromboembolism secondary to Atrial Fibrillation    isosorbide mononitrate SR (IMDUR) 60 MG TABLET SR 24 HR Take 1 Tablet by mouth every morning.    SITagliptin (JANUVIA) 100 MG Tab Take 1 Tablet by mouth every day.    lisinopril (PRINIVIL) 20 MG Tab Take 1 Tablet by mouth every day.    metoprolol SR (TOPROL XL) 50 MG TABLET SR 24 HR Take 1 Tablet by mouth every day.    nitroglycerin (NITROSTAT) 0.4 MG SL Tab DISSOLVE 1 TABLET UNDER THE TONGUE EVERY 5 MINUTES AS  NEEDED FOR CHEST PAIN. MAX  OF 3 TABLETS IN 15 MINUTES. CALL 911 IF PAIN PERSISTS. " "   ranolazine (RANEXA) 500 MG TABLET SR 12 HR Take 1 Tablet by mouth 2 times a day.    rosuvastatin (CRESTOR) 20 MG Tab Take 1 Tablet by mouth every evening.    levothyroxine (SYNTHROID) 50 MCG Tab Take 50 mcg by mouth 2 times a day.    acetaminophen (TYLENOL 8 HOUR ARTHRITIS PAIN) 650 MG CR tablet Take 1,300 mg by mouth every 6 hours as needed for Moderate Pain.    B Complex Vitamins (VITAMIN B COMPLEX) Tab Take 1 Tablet by mouth every morning.         Review of Systems   Constitutional:  Positive for malaise/fatigue.   Respiratory:  Positive for cough and shortness of breath.    Cardiovascular:  Positive for leg swelling. Negative for chest pain and palpitations.   Gastrointestinal:  Negative for blood in stool and melena.   Neurological:  Positive for weakness (generalized). Negative for dizziness.          Objective:   /40 (BP Location: Left arm, Patient Position: Sitting, BP Cuff Size: Adult)   Pulse 68   Resp 16   Ht 1.651 m (5' 5\")   SpO2 96%  Body mass index is 23.3 kg/m².         Physical Exam  Vitals reviewed.   HENT:      Head: Normocephalic and atraumatic.   Cardiovascular:      Rate and Rhythm: Normal rate and regular rhythm.      Heart sounds: Murmur heard.   Pulmonary:      Effort: No respiratory distress.      Comments: Diminished breath sounds at bases, mild rales  Abdominal:      General: There is no distension.   Musculoskeletal:      Right lower leg: Edema present.      Left lower leg: Edema present.   Skin:     General: Skin is warm and dry.   Neurological:      General: No focal deficit present.      Mental Status: She is alert.      Gait: Gait normal.   Psychiatric:         Judgment: Judgment normal.            Assessment:     1. Paroxysmal atrial fibrillation (HCC)  amiodarone (CORDARONE) 200 MG Tab    EKG      2. Secondary hypercoagulable state (HCC)        3. NSTEMI (non-ST elevated myocardial infarction) (HCC)        4. Coronary artery disease of native artery of native heart " with stable angina pectoris (HCC)               Medical Decision Making:  Today's Assessment / Plan:   Hospital follow-up  -I suspect she had a type II MI in the setting of A-fib with RVR, although it is also possible she had a type I NSTEMI and had associated A-fib.  At this point I do not believe she needs to have further coronary angiogram for evaluation.  She will continue on medical therapy    PAF/Atrial flutter  NSTEMI, possibly type 2 in the setting of AF with RVR  - continue amiodarone, reduce to 200mg daily. We will use this long term for rhythm control   - continue apixaban 5mg bid  - discuss watchman or catheter ablation at next visit though I suspect she will want minimal interventions  - EKG today:       CAD/NSTEMI (not amenable to PCI, not surgical candidate)   multivessel complex CAD, medically managed  - no angina symptoms since isosorbide increase to 60mg, continue  -Continue Ranexa 500 mg twice a day  -Metoprolol XL 50 mg  - cont statin       Primary hypertension, possibly overcontrolled  -Cont lisinopril 20 mg, can reduce to 10mg      Hyperlipidemia, controlled  Tolerating Crestor continue.        End of life planning  - on review of hospital records she has not completed a POLST form in your advanced directive.  Given her nonrevascularizable heart disease her end-of-life wishes should be addressed at next visit    No follow-ups on file.

## 2024-04-23 ENCOUNTER — OFFICE VISIT (OUTPATIENT)
Dept: CARDIOLOGY | Facility: MEDICAL CENTER | Age: 89
End: 2024-04-23
Attending: PHYSICIAN ASSISTANT
Payer: MEDICARE

## 2024-04-23 VITALS
DIASTOLIC BLOOD PRESSURE: 40 MMHG | HEART RATE: 68 BPM | HEIGHT: 65 IN | BODY MASS INDEX: 23.3 KG/M2 | SYSTOLIC BLOOD PRESSURE: 110 MMHG | RESPIRATION RATE: 16 BRPM | OXYGEN SATURATION: 96 %

## 2024-04-23 DIAGNOSIS — I48.0 PAROXYSMAL ATRIAL FIBRILLATION (HCC): ICD-10-CM

## 2024-04-23 DIAGNOSIS — D68.69 SECONDARY HYPERCOAGULABLE STATE (HCC): ICD-10-CM

## 2024-04-23 DIAGNOSIS — I25.118 CORONARY ARTERY DISEASE OF NATIVE ARTERY OF NATIVE HEART WITH STABLE ANGINA PECTORIS (HCC): ICD-10-CM

## 2024-04-23 DIAGNOSIS — I21.4 NSTEMI (NON-ST ELEVATED MYOCARDIAL INFARCTION) (HCC): ICD-10-CM

## 2024-04-23 PROCEDURE — 99213 OFFICE O/P EST LOW 20 MIN: CPT | Performed by: PHYSICIAN ASSISTANT

## 2024-04-23 PROCEDURE — 99214 OFFICE O/P EST MOD 30 MIN: CPT | Performed by: PHYSICIAN ASSISTANT

## 2024-04-23 PROCEDURE — 3078F DIAST BP <80 MM HG: CPT | Performed by: PHYSICIAN ASSISTANT

## 2024-04-23 PROCEDURE — 3074F SYST BP LT 130 MM HG: CPT | Performed by: PHYSICIAN ASSISTANT

## 2024-04-23 PROCEDURE — 93005 ELECTROCARDIOGRAM TRACING: CPT | Performed by: PHYSICIAN ASSISTANT

## 2024-04-23 RX ORDER — FUROSEMIDE 20 MG/1
20 TABLET ORAL
COMMUNITY

## 2024-04-23 RX ORDER — AMIODARONE HYDROCHLORIDE 200 MG/1
200 TABLET ORAL DAILY
Qty: 90 TABLET | Refills: 3 | Status: SHIPPED | OUTPATIENT
Start: 2024-04-23

## 2024-04-23 ASSESSMENT — ENCOUNTER SYMPTOMS
COUGH: 1
PALPITATIONS: 0
WEAKNESS: 1
BLOOD IN STOOL: 0
SHORTNESS OF BREATH: 1
DIZZINESS: 0

## 2024-04-23 NOTE — PATIENT INSTRUCTIONS
Take 20mg of lasix (1 whole pill) for 2-3 days in the morning.     Take amiodarone 200mg (1 pill) once a day starting 4/24

## 2024-04-24 LAB — EKG IMPRESSION: NORMAL

## 2024-04-24 PROCEDURE — 93010 ELECTROCARDIOGRAM REPORT: CPT | Performed by: INTERNAL MEDICINE

## 2024-05-03 ENCOUNTER — TELEPHONE (OUTPATIENT)
Dept: CARDIOLOGY | Facility: MEDICAL CENTER | Age: 89
End: 2024-05-03
Payer: MEDICARE

## 2024-05-03 NOTE — TELEPHONE ENCOUNTER
Phone Number Called: [phone]     Call outcome: Spoke to patient regarding message below.    Message: Called to discuss-   1) will keep AF episode log to discuss at OV and call us if any longer episodes   2) rest/ oxygen discussed  3) pt will also keep BP log over next week and I will follow up next Friday for results.    Verbalized understanding, appreciative of call.

## 2024-05-03 NOTE — TELEPHONE ENCOUNTER
EDITH    Caller: Augustin - Spouse     Topic/issue: PT Spouse states since last visit on  04/23/24 PT has gone into AFIB twice.     Night of 04/30, heart rate 120-150    Night of 05/02, heart rate 150    PT spouse put her on oxygen and her heart rate rate returned to 60 after 15-20 mins, oxygen saturation at 87  with oxygen on.     Both accuracies happened after PT was upset or worried about life changes, spouse thinks this may be directly related to mental stress.      Spouse would like to know if in times like this if it would be appropriate to take an additional tab of the     amiodarone (CORDARONE) 200 MG Tab     Callback Number: [phone]    Thank you,   Gwen BRAXTON

## 2024-05-13 NOTE — TELEPHONE ENCOUNTER
Pt's spouse, Will, called back. States pt has had one non-sustaining episode of a-fib. Oxygen applied and resolved. BP under 135/60, HR 58 and SpO2 96% on room air. Appointment time confirmed with spouse. Appreciative of call.

## 2024-05-13 NOTE — TELEPHONE ENCOUNTER
Phone Number Called:  [phone]     Call outcome: Left detailed message for patient. Informed to call back with any additional questions.    Message: Called to check on pt a-fib episodes and BP.

## 2024-05-22 ENCOUNTER — APPOINTMENT (OUTPATIENT)
Dept: CARDIOLOGY | Facility: MEDICAL CENTER | Age: 89
End: 2024-05-22
Attending: PHYSICIAN ASSISTANT
Payer: MEDICARE

## 2024-06-29 ENCOUNTER — APPOINTMENT (OUTPATIENT)
Dept: RADIOLOGY | Facility: MEDICAL CENTER | Age: 89
DRG: 291 | End: 2024-06-29
Attending: EMERGENCY MEDICINE
Payer: MEDICARE

## 2024-06-29 ENCOUNTER — APPOINTMENT (OUTPATIENT)
Dept: RADIOLOGY | Facility: MEDICAL CENTER | Age: 89
DRG: 291 | End: 2024-06-29
Payer: MEDICARE

## 2024-06-29 ENCOUNTER — HOSPITAL ENCOUNTER (INPATIENT)
Facility: MEDICAL CENTER | Age: 89
DRG: 291 | End: 2024-06-29
Attending: EMERGENCY MEDICINE | Admitting: STUDENT IN AN ORGANIZED HEALTH CARE EDUCATION/TRAINING PROGRAM
Payer: MEDICARE

## 2024-06-29 DIAGNOSIS — E44.0 MODERATE PROTEIN-CALORIE MALNUTRITION (HCC): ICD-10-CM

## 2024-06-29 DIAGNOSIS — R79.89 ELEVATED BRAIN NATRIURETIC PEPTIDE (BNP) LEVEL: ICD-10-CM

## 2024-06-29 DIAGNOSIS — J90 PLEURAL EFFUSION: ICD-10-CM

## 2024-06-29 DIAGNOSIS — J18.9 PNEUMONIA OF BOTH LOWER LOBES DUE TO INFECTIOUS ORGANISM: ICD-10-CM

## 2024-06-29 DIAGNOSIS — R41.82 ALTERED MENTAL STATUS, UNSPECIFIED ALTERED MENTAL STATUS TYPE: ICD-10-CM

## 2024-06-29 DIAGNOSIS — I50.1 CARDIOGENIC PULMONARY EDEMA (HCC): ICD-10-CM

## 2024-06-29 DIAGNOSIS — E16.2 HYPOGLYCEMIA: ICD-10-CM

## 2024-06-29 DIAGNOSIS — I25.118 CORONARY ARTERY DISEASE OF NATIVE ARTERY OF NATIVE HEART WITH STABLE ANGINA PECTORIS (HCC): ICD-10-CM

## 2024-06-29 DIAGNOSIS — N39.0 ACUTE UTI: ICD-10-CM

## 2024-06-29 DIAGNOSIS — I50.33 ACUTE ON CHRONIC HEART FAILURE WITH PRESERVED EJECTION FRACTION (HCC): ICD-10-CM

## 2024-06-29 PROBLEM — I50.23 ACUTE ON CHRONIC HFREF (HEART FAILURE WITH REDUCED EJECTION FRACTION) (HCC): Status: ACTIVE | Noted: 2024-06-29

## 2024-06-29 PROBLEM — E11.649 TYPE 2 DIABETES MELLITUS WITH HYPOGLYCEMIA WITHOUT COMA, WITHOUT LONG-TERM CURRENT USE OF INSULIN (HCC): Status: ACTIVE | Noted: 2023-07-12

## 2024-06-29 PROBLEM — G93.40 ACUTE ENCEPHALOPATHY: Status: ACTIVE | Noted: 2024-06-29

## 2024-06-29 PROBLEM — E03.9 ACQUIRED HYPOTHYROIDISM: Status: ACTIVE | Noted: 2024-06-29

## 2024-06-29 PROBLEM — R91.8 PULMONARY INFILTRATES: Status: ACTIVE | Noted: 2024-06-29

## 2024-06-29 LAB
ALBUMIN SERPL BCP-MCNC: 4.4 G/DL (ref 3.2–4.9)
ALBUMIN/GLOB SERPL: 1.9 G/DL
ALP SERPL-CCNC: 61 U/L (ref 30–99)
ALT SERPL-CCNC: 10 U/L (ref 2–50)
AMMONIA PLAS-SCNC: <10 UMOL/L (ref 11–45)
AMPHET UR QL SCN: NEGATIVE
ANION GAP SERPL CALC-SCNC: 12 MMOL/L (ref 7–16)
APPEARANCE UR: ABNORMAL
AST SERPL-CCNC: 17 U/L (ref 12–45)
BACTERIA #/AREA URNS HPF: ABNORMAL /HPF
BARBITURATES UR QL SCN: NEGATIVE
BASOPHILS # BLD AUTO: 0.2 % (ref 0–1.8)
BASOPHILS # BLD: 0.02 K/UL (ref 0–0.12)
BENZODIAZ UR QL SCN: NEGATIVE
BILIRUB SERPL-MCNC: 0.7 MG/DL (ref 0.1–1.5)
BILIRUB UR QL STRIP.AUTO: NEGATIVE
BUN SERPL-MCNC: 16 MG/DL (ref 8–22)
BZE UR QL SCN: NEGATIVE
CALCIUM ALBUM COR SERPL-MCNC: 9.2 MG/DL (ref 8.5–10.5)
CALCIUM SERPL-MCNC: 9.5 MG/DL (ref 8.5–10.5)
CANNABINOIDS UR QL SCN: NEGATIVE
CHLORIDE SERPL-SCNC: 96 MMOL/L (ref 96–112)
CO2 SERPL-SCNC: 27 MMOL/L (ref 20–33)
COLOR UR: ABNORMAL
CREAT SERPL-MCNC: 1.01 MG/DL (ref 0.5–1.4)
EKG IMPRESSION: NORMAL
EOSINOPHIL # BLD AUTO: 0 K/UL (ref 0–0.51)
EOSINOPHIL NFR BLD: 0 % (ref 0–6.9)
EPI CELLS #/AREA URNS HPF: NEGATIVE /HPF
ERYTHROCYTE [DISTWIDTH] IN BLOOD BY AUTOMATED COUNT: 46.9 FL (ref 35.9–50)
ETHANOL BLD-MCNC: <10.1 MG/DL
FENTANYL UR QL: NEGATIVE
GFR SERPLBLD CREATININE-BSD FMLA CKD-EPI: 54 ML/MIN/1.73 M 2
GLOBULIN SER CALC-MCNC: 2.3 G/DL (ref 1.9–3.5)
GLUCOSE BLD STRIP.AUTO-MCNC: 162 MG/DL (ref 65–99)
GLUCOSE SERPL-MCNC: 157 MG/DL (ref 65–99)
GLUCOSE UR STRIP.AUTO-MCNC: NEGATIVE MG/DL
HCG SERPL QL: NEGATIVE
HCT VFR BLD AUTO: 41.7 % (ref 37–47)
HGB BLD-MCNC: 13.5 G/DL (ref 12–16)
HYALINE CASTS #/AREA URNS LPF: ABNORMAL /LPF
IMM GRANULOCYTES # BLD AUTO: 0.09 K/UL (ref 0–0.11)
IMM GRANULOCYTES NFR BLD AUTO: 1.1 % (ref 0–0.9)
KETONES UR STRIP.AUTO-MCNC: ABNORMAL MG/DL
LACTATE SERPL-SCNC: 0.9 MMOL/L (ref 0.5–2)
LACTATE SERPL-SCNC: 1 MMOL/L (ref 0.5–2)
LEUKOCYTE ESTERASE UR QL STRIP.AUTO: ABNORMAL
LYMPHOCYTES # BLD AUTO: 0.38 K/UL (ref 1–4.8)
LYMPHOCYTES NFR BLD: 4.7 % (ref 22–41)
MCH RBC QN AUTO: 29.3 PG (ref 27–33)
MCHC RBC AUTO-ENTMCNC: 32.4 G/DL (ref 32.2–35.5)
MCV RBC AUTO: 90.7 FL (ref 81.4–97.8)
METHADONE UR QL SCN: NEGATIVE
MICRO URNS: ABNORMAL
MONOCYTES # BLD AUTO: 0.43 K/UL (ref 0–0.85)
MONOCYTES NFR BLD AUTO: 5.3 % (ref 0–13.4)
NEUTROPHILS # BLD AUTO: 7.18 K/UL (ref 1.82–7.42)
NEUTROPHILS NFR BLD: 88.7 % (ref 44–72)
NITRITE UR QL STRIP.AUTO: NEGATIVE
NRBC # BLD AUTO: 0 K/UL
NRBC BLD-RTO: 0 /100 WBC (ref 0–0.2)
NT-PROBNP SERPL IA-MCNC: 7790 PG/ML (ref 0–125)
OPIATES UR QL SCN: NEGATIVE
OXYCODONE UR QL SCN: NEGATIVE
PCP UR QL SCN: NEGATIVE
PH UR STRIP.AUTO: 6.5 [PH] (ref 5–8)
PLATELET # BLD AUTO: 211 K/UL (ref 164–446)
PMV BLD AUTO: 9.7 FL (ref 9–12.9)
POTASSIUM SERPL-SCNC: 4.6 MMOL/L (ref 3.6–5.5)
PROCALCITONIN SERPL-MCNC: 0.16 NG/ML
PROPOXYPH UR QL SCN: NEGATIVE
PROT SERPL-MCNC: 6.7 G/DL (ref 6–8.2)
PROT UR QL STRIP: 100 MG/DL
RBC # BLD AUTO: 4.6 M/UL (ref 4.2–5.4)
RBC # URNS HPF: ABNORMAL /HPF
RBC UR QL AUTO: NEGATIVE
SODIUM SERPL-SCNC: 135 MMOL/L (ref 135–145)
SP GR UR STRIP.AUTO: 1.02
TROPONIN T SERPL-MCNC: 33 NG/L (ref 6–19)
UROBILINOGEN UR STRIP.AUTO-MCNC: 0.2 MG/DL
WBC # BLD AUTO: 8.1 K/UL (ref 4.8–10.8)
WBC #/AREA URNS HPF: ABNORMAL /HPF

## 2024-06-29 PROCEDURE — 83880 ASSAY OF NATRIURETIC PEPTIDE: CPT

## 2024-06-29 PROCEDURE — 87040 BLOOD CULTURE FOR BACTERIA: CPT

## 2024-06-29 PROCEDURE — 700111 HCHG RX REV CODE 636 W/ 250 OVERRIDE (IP): Mod: JZ | Performed by: EMERGENCY MEDICINE

## 2024-06-29 PROCEDURE — 96365 THER/PROPH/DIAG IV INF INIT: CPT

## 2024-06-29 PROCEDURE — 85025 COMPLETE CBC W/AUTO DIFF WBC: CPT

## 2024-06-29 PROCEDURE — 82077 ASSAY SPEC XCP UR&BREATH IA: CPT

## 2024-06-29 PROCEDURE — 87077 CULTURE AEROBIC IDENTIFY: CPT

## 2024-06-29 PROCEDURE — 84484 ASSAY OF TROPONIN QUANT: CPT

## 2024-06-29 PROCEDURE — 82962 GLUCOSE BLOOD TEST: CPT

## 2024-06-29 PROCEDURE — A9270 NON-COVERED ITEM OR SERVICE: HCPCS | Performed by: STUDENT IN AN ORGANIZED HEALTH CARE EDUCATION/TRAINING PROGRAM

## 2024-06-29 PROCEDURE — 99285 EMERGENCY DEPT VISIT HI MDM: CPT

## 2024-06-29 PROCEDURE — 82140 ASSAY OF AMMONIA: CPT

## 2024-06-29 PROCEDURE — 99223 1ST HOSP IP/OBS HIGH 75: CPT | Mod: AI | Performed by: STUDENT IN AN ORGANIZED HEALTH CARE EDUCATION/TRAINING PROGRAM

## 2024-06-29 PROCEDURE — 36415 COLL VENOUS BLD VENIPUNCTURE: CPT

## 2024-06-29 PROCEDURE — 74176 CT ABD & PELVIS W/O CONTRAST: CPT

## 2024-06-29 PROCEDURE — 84145 PROCALCITONIN (PCT): CPT

## 2024-06-29 PROCEDURE — A9270 NON-COVERED ITEM OR SERVICE: HCPCS | Performed by: EMERGENCY MEDICINE

## 2024-06-29 PROCEDURE — 770020 HCHG ROOM/CARE - TELE (206)

## 2024-06-29 PROCEDURE — 93005 ELECTROCARDIOGRAM TRACING: CPT | Performed by: EMERGENCY MEDICINE

## 2024-06-29 PROCEDURE — 80053 COMPREHEN METABOLIC PANEL: CPT

## 2024-06-29 PROCEDURE — 80307 DRUG TEST PRSMV CHEM ANLYZR: CPT

## 2024-06-29 PROCEDURE — 700102 HCHG RX REV CODE 250 W/ 637 OVERRIDE(OP): Performed by: STUDENT IN AN ORGANIZED HEALTH CARE EDUCATION/TRAINING PROGRAM

## 2024-06-29 PROCEDURE — 84703 CHORIONIC GONADOTROPIN ASSAY: CPT

## 2024-06-29 PROCEDURE — 70450 CT HEAD/BRAIN W/O DYE: CPT

## 2024-06-29 PROCEDURE — 87086 URINE CULTURE/COLONY COUNT: CPT

## 2024-06-29 PROCEDURE — 700102 HCHG RX REV CODE 250 W/ 637 OVERRIDE(OP): Performed by: EMERGENCY MEDICINE

## 2024-06-29 PROCEDURE — 71045 X-RAY EXAM CHEST 1 VIEW: CPT

## 2024-06-29 PROCEDURE — 87186 SC STD MICRODIL/AGAR DIL: CPT

## 2024-06-29 PROCEDURE — 81001 URINALYSIS AUTO W/SCOPE: CPT

## 2024-06-29 PROCEDURE — 96375 TX/PRO/DX INJ NEW DRUG ADDON: CPT

## 2024-06-29 PROCEDURE — 83605 ASSAY OF LACTIC ACID: CPT

## 2024-06-29 PROCEDURE — 700105 HCHG RX REV CODE 258: Performed by: EMERGENCY MEDICINE

## 2024-06-29 RX ORDER — AMIODARONE HYDROCHLORIDE 200 MG/1
200 TABLET ORAL DAILY
Status: DISCONTINUED | OUTPATIENT
Start: 2024-06-30 | End: 2024-07-05 | Stop reason: HOSPADM

## 2024-06-29 RX ORDER — METOPROLOL SUCCINATE 25 MG/1
50 TABLET, EXTENDED RELEASE ORAL DAILY
Status: DISCONTINUED | OUTPATIENT
Start: 2024-06-30 | End: 2024-07-05 | Stop reason: HOSPADM

## 2024-06-29 RX ORDER — ACETAMINOPHEN 500 MG
1000 TABLET ORAL EVERY 6 HOURS PRN
Status: DISCONTINUED | OUTPATIENT
Start: 2024-06-29 | End: 2024-07-05 | Stop reason: HOSPADM

## 2024-06-29 RX ORDER — AZITHROMYCIN 250 MG/1
500 TABLET, FILM COATED ORAL ONCE
Status: COMPLETED | OUTPATIENT
Start: 2024-06-29 | End: 2024-06-29

## 2024-06-29 RX ORDER — CEFTRIAXONE 1 G/1
1000 INJECTION, POWDER, FOR SOLUTION INTRAMUSCULAR; INTRAVENOUS ONCE
Status: DISCONTINUED | OUTPATIENT
Start: 2024-06-29 | End: 2024-06-29

## 2024-06-29 RX ORDER — FUROSEMIDE 20 MG/1
20 TABLET ORAL
Status: DISCONTINUED | OUTPATIENT
Start: 2024-06-30 | End: 2024-06-29

## 2024-06-29 RX ORDER — ISOSORBIDE MONONITRATE 60 MG/1
60 TABLET, EXTENDED RELEASE ORAL EVERY MORNING
Status: DISCONTINUED | OUTPATIENT
Start: 2024-06-30 | End: 2024-07-05 | Stop reason: HOSPADM

## 2024-06-29 RX ORDER — LEVOTHYROXINE SODIUM 0.05 MG/1
50 TABLET ORAL 2 TIMES DAILY
Status: DISCONTINUED | OUTPATIENT
Start: 2024-06-29 | End: 2024-07-05 | Stop reason: HOSPADM

## 2024-06-29 RX ORDER — B-COMPLEX WITH VITAMIN C
1 TABLET ORAL EVERY MORNING
Status: DISCONTINUED | OUTPATIENT
Start: 2024-06-30 | End: 2024-06-29

## 2024-06-29 RX ORDER — ONDANSETRON 2 MG/ML
4 INJECTION INTRAMUSCULAR; INTRAVENOUS EVERY 4 HOURS PRN
Status: DISCONTINUED | OUTPATIENT
Start: 2024-06-29 | End: 2024-07-05 | Stop reason: HOSPADM

## 2024-06-29 RX ORDER — NITROGLYCERIN 0.4 MG/1
0.4 TABLET SUBLINGUAL
Status: DISCONTINUED | OUTPATIENT
Start: 2024-06-29 | End: 2024-07-05 | Stop reason: HOSPADM

## 2024-06-29 RX ORDER — FUROSEMIDE 10 MG/ML
20 INJECTION INTRAMUSCULAR; INTRAVENOUS
Status: DISCONTINUED | OUTPATIENT
Start: 2024-06-30 | End: 2024-06-30

## 2024-06-29 RX ORDER — ROSUVASTATIN CALCIUM 5 MG/1
20 TABLET, COATED ORAL EVERY EVENING
Status: DISCONTINUED | OUTPATIENT
Start: 2024-06-29 | End: 2024-07-05 | Stop reason: HOSPADM

## 2024-06-29 RX ORDER — LISINOPRIL 20 MG/1
20 TABLET ORAL DAILY
Status: DISCONTINUED | OUTPATIENT
Start: 2024-06-30 | End: 2024-07-05 | Stop reason: HOSPADM

## 2024-06-29 RX ORDER — DEXTROSE MONOHYDRATE 25 G/50ML
25 INJECTION, SOLUTION INTRAVENOUS
Status: DISCONTINUED | OUTPATIENT
Start: 2024-06-29 | End: 2024-07-05 | Stop reason: HOSPADM

## 2024-06-29 RX ORDER — RANOLAZINE 500 MG/1
500 TABLET, EXTENDED RELEASE ORAL 2 TIMES DAILY
Status: DISCONTINUED | OUTPATIENT
Start: 2024-06-29 | End: 2024-07-05 | Stop reason: HOSPADM

## 2024-06-29 RX ORDER — ONDANSETRON 4 MG/1
4 TABLET, ORALLY DISINTEGRATING ORAL EVERY 4 HOURS PRN
Status: DISCONTINUED | OUTPATIENT
Start: 2024-06-29 | End: 2024-07-05 | Stop reason: HOSPADM

## 2024-06-29 RX ORDER — FUROSEMIDE 10 MG/ML
40 INJECTION INTRAMUSCULAR; INTRAVENOUS ONCE
Status: COMPLETED | OUTPATIENT
Start: 2024-06-29 | End: 2024-06-29

## 2024-06-29 RX ORDER — HYDRALAZINE HYDROCHLORIDE 20 MG/ML
10 INJECTION INTRAMUSCULAR; INTRAVENOUS ONCE
Status: COMPLETED | OUTPATIENT
Start: 2024-06-29 | End: 2024-06-29

## 2024-06-29 RX ADMIN — AZITHROMYCIN DIHYDRATE 500 MG: 250 TABLET ORAL at 21:49

## 2024-06-29 RX ADMIN — LEVOTHYROXINE SODIUM 50 MCG: 0.05 TABLET ORAL at 21:49

## 2024-06-29 RX ADMIN — APIXABAN 5 MG: 5 TABLET, FILM COATED ORAL at 21:48

## 2024-06-29 RX ADMIN — FUROSEMIDE 40 MG: 10 INJECTION, SOLUTION INTRAVENOUS at 20:11

## 2024-06-29 RX ADMIN — AMPICILLIN AND SULBACTAM 3 G: 1; 2 INJECTION, POWDER, FOR SOLUTION INTRAMUSCULAR; INTRAVENOUS at 20:10

## 2024-06-29 RX ADMIN — HYDRALAZINE HYDROCHLORIDE 10 MG: 20 INJECTION, SOLUTION INTRAMUSCULAR; INTRAVENOUS at 20:00

## 2024-06-29 RX ADMIN — ROSUVASTATIN CALCIUM 20 MG: 20 TABLET, FILM COATED ORAL at 21:48

## 2024-06-29 SDOH — ECONOMIC STABILITY: TRANSPORTATION INSECURITY
IN THE PAST 12 MONTHS, HAS LACK OF RELIABLE TRANSPORTATION KEPT YOU FROM MEDICAL APPOINTMENTS, MEETINGS, WORK OR FROM GETTING THINGS NEEDED FOR DAILY LIVING?: NO

## 2024-06-29 SDOH — ECONOMIC STABILITY: TRANSPORTATION INSECURITY
IN THE PAST 12 MONTHS, HAS THE LACK OF TRANSPORTATION KEPT YOU FROM MEDICAL APPOINTMENTS OR FROM GETTING MEDICATIONS?: NO

## 2024-06-29 ASSESSMENT — PATIENT HEALTH QUESTIONNAIRE - PHQ9
1. LITTLE INTEREST OR PLEASURE IN DOING THINGS: SEVERAL DAYS
8. MOVING OR SPEAKING SO SLOWLY THAT OTHER PEOPLE COULD HAVE NOTICED. OR THE OPPOSITE, BEING SO FIGETY OR RESTLESS THAT YOU HAVE BEEN MOVING AROUND A LOT MORE THAN USUAL: NOT AT ALL
5. POOR APPETITE OR OVEREATING: SEVERAL DAYS
SUM OF ALL RESPONSES TO PHQ QUESTIONS 1-9: 5
6. FEELING BAD ABOUT YOURSELF - OR THAT YOU ARE A FAILURE OR HAVE LET YOURSELF OR YOUR FAMILY DOWN: SEVERAL DAYS
7. TROUBLE CONCENTRATING ON THINGS, SUCH AS READING THE NEWSPAPER OR WATCHING TELEVISION: NOT AT ALL
2. FEELING DOWN, DEPRESSED, IRRITABLE, OR HOPELESS: SEVERAL DAYS
SUM OF ALL RESPONSES TO PHQ9 QUESTIONS 1 AND 2: 2
9. THOUGHTS THAT YOU WOULD BE BETTER OFF DEAD, OR OF HURTING YOURSELF: NOT AT ALL
3. TROUBLE FALLING OR STAYING ASLEEP OR SLEEPING TOO MUCH: NOT AT ALL
4. FEELING TIRED OR HAVING LITTLE ENERGY: SEVERAL DAYS

## 2024-06-29 ASSESSMENT — ENCOUNTER SYMPTOMS
MYALGIAS: 0
BACK PAIN: 0
BLOOD IN STOOL: 0
EYE PAIN: 0
DIARRHEA: 0
SPUTUM PRODUCTION: 0
CHILLS: 0
HEADACHES: 0
MEMORY LOSS: 0
NAUSEA: 0
SORE THROAT: 0
NECK PAIN: 0
ABDOMINAL PAIN: 0
HEMOPTYSIS: 0
FALLS: 0
DEPRESSION: 1
BRUISES/BLEEDS EASILY: 0
SHORTNESS OF BREATH: 1
COUGH: 1
DIZZINESS: 1
CONSTIPATION: 0
FLANK PAIN: 0
NERVOUS/ANXIOUS: 1
LOSS OF CONSCIOUSNESS: 0
FEVER: 0
VOMITING: 0
SINUS PAIN: 0

## 2024-06-29 ASSESSMENT — COGNITIVE AND FUNCTIONAL STATUS - GENERAL
TOILETING: A LOT
SUGGESTED CMS G CODE MODIFIER DAILY ACTIVITY: CL
DAILY ACTIVITIY SCORE: 12
MOBILITY SCORE: 14
MOVING TO AND FROM BED TO CHAIR: A LOT
CLIMB 3 TO 5 STEPS WITH RAILING: A LOT
SUGGESTED CMS G CODE MODIFIER MOBILITY: CL
DRESSING REGULAR LOWER BODY CLOTHING: A LOT
MOVING FROM LYING ON BACK TO SITTING ON SIDE OF FLAT BED: A LITTLE
EATING MEALS: A LOT
DRESSING REGULAR UPPER BODY CLOTHING: A LOT
HELP NEEDED FOR BATHING: A LOT
TURNING FROM BACK TO SIDE WHILE IN FLAT BAD: A LITTLE
STANDING UP FROM CHAIR USING ARMS: A LOT
PERSONAL GROOMING: A LOT
WALKING IN HOSPITAL ROOM: A LOT

## 2024-06-29 ASSESSMENT — LIFESTYLE VARIABLES
AVERAGE NUMBER OF DAYS PER WEEK YOU HAVE A DRINK CONTAINING ALCOHOL: 0
HAVE YOU EVER FELT YOU SHOULD CUT DOWN ON YOUR DRINKING: NO
DOES PATIENT WANT TO STOP DRINKING: NO
EVER FELT BAD OR GUILTY ABOUT YOUR DRINKING: NO
ALCOHOL_USE: NO
HAVE PEOPLE ANNOYED YOU BY CRITICIZING YOUR DRINKING: NO
TOTAL SCORE: 0
HOW MANY TIMES IN THE PAST YEAR HAVE YOU HAD 5 OR MORE DRINKS IN A DAY: 0
EVER HAD A DRINK FIRST THING IN THE MORNING TO STEADY YOUR NERVES TO GET RID OF A HANGOVER: NO
TOTAL SCORE: 0
TOTAL SCORE: 0
CONSUMPTION TOTAL: NEGATIVE
ON A TYPICAL DAY WHEN YOU DRINK ALCOHOL HOW MANY DRINKS DO YOU HAVE: 0

## 2024-06-29 ASSESSMENT — SOCIAL DETERMINANTS OF HEALTH (SDOH)
WITHIN THE PAST 12 MONTHS, YOU WORRIED THAT YOUR FOOD WOULD RUN OUT BEFORE YOU GOT THE MONEY TO BUY MORE: NEVER TRUE
WITHIN THE LAST YEAR, HAVE YOU BEEN KICKED, HIT, SLAPPED, OR OTHERWISE PHYSICALLY HURT BY YOUR PARTNER OR EX-PARTNER?: NO
IN THE PAST 12 MONTHS, HAS THE ELECTRIC, GAS, OIL, OR WATER COMPANY THREATENED TO SHUT OFF SERVICE IN YOUR HOME?: NO
WITHIN THE LAST YEAR, HAVE YOU BEEN HUMILIATED OR EMOTIONALLY ABUSED IN OTHER WAYS BY YOUR PARTNER OR EX-PARTNER?: NO
WITHIN THE LAST YEAR, HAVE TO BEEN RAPED OR FORCED TO HAVE ANY KIND OF SEXUAL ACTIVITY BY YOUR PARTNER OR EX-PARTNER?: NO
WITHIN THE PAST 12 MONTHS, THE FOOD YOU BOUGHT JUST DIDN'T LAST AND YOU DIDN'T HAVE MONEY TO GET MORE: NEVER TRUE
WITHIN THE LAST YEAR, HAVE YOU BEEN AFRAID OF YOUR PARTNER OR EX-PARTNER?: NO

## 2024-06-29 ASSESSMENT — FIBROSIS 4 INDEX
FIB4 SCORE: 3.64
FIB4 SCORE: 2.19

## 2024-06-30 VITALS
WEIGHT: 125.88 LBS | DIASTOLIC BLOOD PRESSURE: 54 MMHG | HEART RATE: 48 BPM | RESPIRATION RATE: 18 BRPM | TEMPERATURE: 97.9 F | HEIGHT: 65 IN | BODY MASS INDEX: 20.97 KG/M2 | SYSTOLIC BLOOD PRESSURE: 144 MMHG | OXYGEN SATURATION: 96 %

## 2024-06-30 PROBLEM — J96.01 ACUTE RESPIRATORY FAILURE WITH HYPOXIA (HCC): Status: ACTIVE | Noted: 2024-06-30

## 2024-06-30 LAB
ANION GAP SERPL CALC-SCNC: 14 MMOL/L (ref 7–16)
BACTERIA BLD CULT: NORMAL
BACTERIA BLD CULT: NORMAL
BACTERIA UR CULT: ABNORMAL
BACTERIA UR CULT: ABNORMAL
BUN SERPL-MCNC: 17 MG/DL (ref 8–22)
CALCIUM SERPL-MCNC: 9.5 MG/DL (ref 8.5–10.5)
CHLORIDE SERPL-SCNC: 93 MMOL/L (ref 96–112)
CO2 SERPL-SCNC: 28 MMOL/L (ref 20–33)
CREAT SERPL-MCNC: 1.04 MG/DL (ref 0.5–1.4)
GFR SERPLBLD CREATININE-BSD FMLA CKD-EPI: 52 ML/MIN/1.73 M 2
GLUCOSE BLD STRIP.AUTO-MCNC: 130 MG/DL (ref 65–99)
GLUCOSE SERPL-MCNC: 131 MG/DL (ref 65–99)
MAGNESIUM SERPL-MCNC: 1.9 MG/DL (ref 1.5–2.5)
POTASSIUM SERPL-SCNC: 4 MMOL/L (ref 3.6–5.5)
SIGNIFICANT IND 70042: ABNORMAL
SIGNIFICANT IND 70042: NORMAL
SIGNIFICANT IND 70042: NORMAL
SITE SITE: ABNORMAL
SITE SITE: NORMAL
SITE SITE: NORMAL
SODIUM SERPL-SCNC: 135 MMOL/L (ref 135–145)
SOURCE SOURCE: ABNORMAL
SOURCE SOURCE: NORMAL
SOURCE SOURCE: NORMAL

## 2024-06-30 PROCEDURE — 770020 HCHG ROOM/CARE - TELE (206)

## 2024-06-30 PROCEDURE — 82962 GLUCOSE BLOOD TEST: CPT

## 2024-06-30 PROCEDURE — 36415 COLL VENOUS BLD VENIPUNCTURE: CPT

## 2024-06-30 PROCEDURE — 83735 ASSAY OF MAGNESIUM: CPT

## 2024-06-30 PROCEDURE — A9270 NON-COVERED ITEM OR SERVICE: HCPCS | Performed by: STUDENT IN AN ORGANIZED HEALTH CARE EDUCATION/TRAINING PROGRAM

## 2024-06-30 PROCEDURE — 700102 HCHG RX REV CODE 250 W/ 637 OVERRIDE(OP): Performed by: STUDENT IN AN ORGANIZED HEALTH CARE EDUCATION/TRAINING PROGRAM

## 2024-06-30 PROCEDURE — 80048 BASIC METABOLIC PNL TOTAL CA: CPT

## 2024-06-30 PROCEDURE — 700111 HCHG RX REV CODE 636 W/ 250 OVERRIDE (IP): Mod: JZ | Performed by: STUDENT IN AN ORGANIZED HEALTH CARE EDUCATION/TRAINING PROGRAM

## 2024-06-30 PROCEDURE — 99232 SBSQ HOSP IP/OBS MODERATE 35: CPT | Performed by: STUDENT IN AN ORGANIZED HEALTH CARE EDUCATION/TRAINING PROGRAM

## 2024-06-30 PROCEDURE — 90832 PSYTX W PT 30 MINUTES: CPT | Performed by: SOCIAL WORKER

## 2024-06-30 RX ORDER — FUROSEMIDE 10 MG/ML
40 INJECTION INTRAMUSCULAR; INTRAVENOUS
Status: DISCONTINUED | OUTPATIENT
Start: 2024-06-30 | End: 2024-07-02

## 2024-06-30 RX ADMIN — SERTRALINE HYDROCHLORIDE 50 MG: 50 TABLET ORAL at 12:01

## 2024-06-30 RX ADMIN — RANOLAZINE 500 MG: 500 TABLET, EXTENDED RELEASE ORAL at 06:09

## 2024-06-30 RX ADMIN — LEVOTHYROXINE SODIUM 50 MCG: 0.05 TABLET ORAL at 06:09

## 2024-06-30 RX ADMIN — FUROSEMIDE 20 MG: 10 INJECTION, SOLUTION INTRAVENOUS at 06:10

## 2024-06-30 RX ADMIN — LEVOTHYROXINE SODIUM 50 MCG: 0.05 TABLET ORAL at 18:00

## 2024-06-30 RX ADMIN — APIXABAN 5 MG: 5 TABLET, FILM COATED ORAL at 18:00

## 2024-06-30 RX ADMIN — APIXABAN 5 MG: 5 TABLET, FILM COATED ORAL at 06:09

## 2024-06-30 RX ADMIN — AMIODARONE HYDROCHLORIDE 200 MG: 200 TABLET ORAL at 06:09

## 2024-06-30 RX ADMIN — RANOLAZINE 500 MG: 500 TABLET, EXTENDED RELEASE ORAL at 18:00

## 2024-06-30 RX ADMIN — FUROSEMIDE 40 MG: 10 INJECTION, SOLUTION INTRAVENOUS at 18:00

## 2024-06-30 RX ADMIN — LISINOPRIL 20 MG: 20 TABLET ORAL at 06:09

## 2024-06-30 RX ADMIN — METOPROLOL SUCCINATE 50 MG: 25 TABLET, EXTENDED RELEASE ORAL at 06:09

## 2024-06-30 RX ADMIN — ISOSORBIDE MONONITRATE 60 MG: 60 TABLET, EXTENDED RELEASE ORAL at 06:09

## 2024-06-30 RX ADMIN — ROSUVASTATIN CALCIUM 20 MG: 20 TABLET, FILM COATED ORAL at 18:00

## 2024-06-30 ASSESSMENT — ENCOUNTER SYMPTOMS
BACK PAIN: 0
ABDOMINAL PAIN: 0
MYALGIAS: 0
HEADACHES: 0
VOMITING: 0
NECK PAIN: 0
DEPRESSION: 1
FEVER: 0
CHILLS: 0
SHORTNESS OF BREATH: 0
NAUSEA: 0

## 2024-06-30 ASSESSMENT — FIBROSIS 4 INDEX
FIB4 SCORE: 2.19
FIB4 SCORE: 2.19

## 2024-06-30 ASSESSMENT — COGNITIVE AND FUNCTIONAL STATUS - GENERAL
DRESSING REGULAR LOWER BODY CLOTHING: A LOT
PERSONAL GROOMING: A LOT
MOBILITY SCORE: 10
STANDING UP FROM CHAIR USING ARMS: TOTAL
SUGGESTED CMS G CODE MODIFIER MOBILITY: CL
WALKING IN HOSPITAL ROOM: TOTAL
MOVING FROM LYING ON BACK TO SITTING ON SIDE OF FLAT BED: A LOT
DAILY ACTIVITIY SCORE: 11
TURNING FROM BACK TO SIDE WHILE IN FLAT BAD: A LITTLE
HELP NEEDED FOR BATHING: A LOT
TOILETING: TOTAL
EATING MEALS: A LOT
DRESSING REGULAR UPPER BODY CLOTHING: A LOT
CLIMB 3 TO 5 STEPS WITH RAILING: TOTAL
MOVING TO AND FROM BED TO CHAIR: A LOT
SUGGESTED CMS G CODE MODIFIER DAILY ACTIVITY: CL

## 2024-06-30 NOTE — CARE PLAN
The patient is Stable - Low risk of patient condition declining or worsening    Shift Goals  Clinical Goals: Stable neuro status, maintain skin integrity  Patient Goals: Rest  Family Goals: DEBBIE    Progress made toward(s) clinical / shift goals:      Problem: Neuro Status  Goal: Neuro status will remain stable or improve  Outcome: Progressing   Q4H neuro checks in place. Pt's neurological status (A/Ox2) remains unchanged throughout shift. Bed alarm is on, call light within reach.      Problem: Skin Integrity  Goal: Skin integrity is maintained or improved  Outcome: Progressing   Pt turned and repositioned Q2H or as requested. Barrier cream and mepilexes used to prevent skin breakdown on delicate perineal areas and bony prominences. Linen changes provided as needed to avoid risk of developing pressure ulcers.     Patient is not progressing towards the following goals:

## 2024-06-30 NOTE — PROGRESS NOTES
4 Eyes Skin Assessment Completed by ZAK sorensen and ZAK horton.    Head WDL  Ears Redness and Blanching  Nose WDL  Mouth WDL  Neck WDL  Breast/Chest WDL  Shoulder Blades WDL  Spine WDL  (R) Arm/Elbow/Hand Bruising  (L) Arm/Elbow/Hand WDL  Abdomen WDL  Groin Redness and Blanching  Scrotum/Coccyx/Buttocks Redness and Blanching  (R) Leg WDL  (L) Leg WDL  (R) Heel/Foot/Toe WDL  (L) Heel/Foot/Toe WDL          Devices In Places Blood Pressure Cuff      Interventions In Place Gray Ear Foams, Pillows, and Q2 Turns    Possible Skin Injury No    Pictures Uploaded Into Epic Yes  Wound Consult Placed N/A  RN Wound Prevention Protocol Ordered Yes

## 2024-06-30 NOTE — ASSESSMENT & PLAN NOTE
Attributed to symptomatic coronary artery disease  Causing anorexia and anhedonia sufficient for hypoglycemia and dehydration  Psychology consult  Family support TBD tomorrow when son visits  Agreeable to initiation of sertraline (ELIJAH JACK)

## 2024-06-30 NOTE — CARE PLAN
The patient is Stable - Low risk of patient condition declining or worsening    Shift Goals  Clinical Goals: Maintain skin integrity  Patient Goals: Rest  Family Goals: DEBBIE    Progress made toward(s) clinical / shift goals:  Q2hour turns in place. PT/OT eval ordered. POC discussed with patient.     Patient is not progressing towards the following goals:

## 2024-06-30 NOTE — CONSULTS
"[facility]    INPATIENT ASSESSMENT    Name: Nancy King  MRN: [id number]  : 1935  Age: 88 y.o.  Date of assessment: 2024  PCP: DORI Gomez  Persons in attendance: Patient    HPI: per medical record: \"Nancy King is a 88 y.o. female with HTN, HFpEF, Afib on apixaban, T2DM, CAD s/p NSTEMI who presented 2024 with hypoglycemia.     She reports for the past 2 weeks she's been feeling depressed. She's had no appetite without nausea. She has lost weight but unsure how much. She feels she is taking 1 step forward and 2 steps back. She lives with her  and states \"that has to be enough.\" She has been feeling dizzy and has frequent chest pain. The chest pain has been unchanged for many months. She feels short of breath whether upright or laying down. She has a nonproductive cough. She denies bleeding, F/C, bowel/bladder dysfunction, falls, peripheral edema. She was altered this morning prompting family to call EMS. BG was reportedly 39 and she responded well to administration of dextrose.\" Patient was referred to Behavioral Health due to depressive symptoms.    Psychotherapy Session Summary (as stated by Patient): Nancy King, is a pleasant, 88 year old female, seen lying on hospital bed, speech soft and slight, but coherent. Patient was engaged and able to participate in the interview process.  Patient displayed a flexible affect with full range of emotion. Patient denies suicidal ideations although acknowledges some depression. Patient states she feels sad related to the recent loss of a pet and her medical condition, but states overall, \"I feel happy, I have had a good life\".    Patient reports being  for 67 years with two adult children and three adult grand children.  Patient denies any history of abuse or neglect. Patient reports she was an only child and was often home alone and grew up feeling very lonely.     Patient states she and her  " "reside alone although their adult children reside in the area and are available for assistance if needed. Patient states she lives in a two story house and feels, although she is in a wheel chair, she and her  are able to manage without home care assistance. Patient states she has a lift on her stairs to bring her up and down from the second story. Patient states she and her  eat two meals a day, usually skipping lunch. Patient admits to a decreased appetite although food is readily available.    Patient states she recognizes what she is going through medically is similar to what she witnessed both of her parents go through. Patient reports both parents had heart conditions. Patient believes she is following their path. Patient reports although this realization brings some sadness, she also maintains a level of hopefulness and continues to state, \"I had a good life\". Patient reports feeling some peace about her current medical condition however states again, \"I am not suicidal\".    Clinician provided support and encouragement. Clinician processed with patient the challenges of not being well and the ways these situations a can impact mood and emotional well being. Patient was able to express her feelings and receive suggestions related to mood management. Patient expressed appreciation for the session.      Chief Complaint   Patient presents with    ALOC     Pt biba [employer] from home, pt family called due to pt having altered level of consciousness, last known well two weeks ago, pt alert and oriented x2, on arrival pt had blood sugar 39, pt was given oral glucose tab 15mg, blood sugar check after 256, pt has no complains, GCS 14, pt is incontinent of urine, on transport pt was given 4mg zofran, blood sugar check again 151, 174/71, HR 59, history of DM, afib on eliquist, hyperlipidemia, hypothyroid        CURRENT LIVING SITUATION/SOCIAL SUPPORT: Patient resides with her . They have adult " "children who live near by.    BEHAVIORAL HEALTH TREATMENT HISTORY  Does patient/parent report a history of prior behavioral health treatment for patient?   No:    SAFETY ASSESSMENT - SELF  Does patient acknowledge current or past symptoms of dangerousness to self? no  Does parent/significant other report patient has current or past symptoms of dangerousness to self? N\A  Does presenting problem suggest symptoms of dangerousness to self? No    SAFETY ASSESSMENT - OTHERS  Does patient acknowledge current or past symptoms of aggressive behavior or risk to others? no  Does parent/significant other report patient has current or past symptoms of aggressive behavior or risk to others?  N\A  Does presenting problem suggest symptoms of dangerousness to others? No    Crisis Safety Plan completed and copy given to patient? no    ABUSE/NEGLECT SCREENING  Does patient report feeling “unsafe” in his/her home, or afraid of anyone?  no  Does patient report any history of physical, sexual, or emotional abuse?  no  Does parent or significant other report any of the above? N\A  Is there evidence of neglect by self?  no  Is there evidence of neglect by a caregiver? no  Does the patient/parent report any history of CPS/APS/police involvement related to suspected abuse/neglect or domestic violence? no  Based on the information provided during the current assessment, is a mandated report of suspected abuse/neglect being made?  No    SUBSTANCE USE SCREENING  Yes:  Walter all substances used in the past 30 days:      Last Use Amount   []   Alcohol     []   Marijuana     []   Heroin     []   Prescription Opioids  (used without prescription, for    recreation, or in excess of prescribed amount)     []   Other Prescription  (used without prescription, for    recreation, or in excess of prescribed amount)     []   Cocaine      []   Methamphetamine     []   \"\" drugs (ectasy, MDMA)     []   Other substances        UDS results: " negative  Diagnostic alcohol results: <10.1    What consequences does the patient associate with any of the above substance use and or addictive behaviors? None    Risk factors for detox (check all that apply):  []  Seizures   []  Diaphoretic (sweating)   []  Tremors   []  Hallucinations   []  Increased blood pressure   []  Decreased blood pressure   []  Other   []  None      [] Patient education on risk factors for detoxification and instructed to return to ER as needed.      MENTAL STATUS              Participation: Limited verbal participation  Grooming: Casual  Orientation: Alert  Behavior: Calm  Eye contact: Good  Mood: Depressed  Affect: Flexible and Full range  Thought process: Logical  Thought content: Within normal limits  Speech: Soft  Perception: Within normal limits  Memory:  Recent:  Adequate  Insight: Adequate  Judgment:  Adequate  Other:    Collateral information:   Source:   Significant other present in person:    Significant other by telephone   [facility]   [facility] Record-reviewed   Other: physician     Unable to complete full assessment due to:   Acute intoxication   Patient declined to participate/engage   Patient verbally unresponsive   Significant cognitive deficits   Significant perceptual distortions or behavioral disorganization   Other:             CLINICAL IMPRESSIONS:  Primary:  Depressive symptoms due to other medical condition  Secondary:                                         IDENTIFIED NEEDS/PLAN:  [Trigger DISPOSITION list for any items marked]      Imminent safety risk - self  Imminent safety risk - others     Acute substance withdrawal   Psychosis/Impaired reality testing   x  Mood/anxiety   Substance use/Addictive behavior     Maladaptive behavior   Parent/child conflict     Family/Couples conflict   Biomedical     Housing   Financial      Legal  Occupational/Educational     Domestic violence   Other:     Recommendations and Observation  Level:  If patient is discharged to home please include a home health order for an MSW to monitor home needs.  Please consider a referral to Palliative for additional support and resources.    Thank you,            Legal Hold: N/A          Rama Aguilar, Ph.D., [facility]  6/30/2024  Length of intervention: 30 minutes

## 2024-06-30 NOTE — ED TRIAGE NOTES
"Chief Complaint   Patient presents with    ALOC     Pt biba [employer] from home, pt family called due to pt having altered level of consciousness, last known well two weeks ago, pt alert and oriented x2, on arrival pt had blood sugar 39, pt was given oral glucose tab 15mg, blood sugar check after 256, pt has no complains, GCS 14, pt is incontinent of urine, on transport pt was given 4mg zofran, blood sugar check again 151, 174/71, HR 59, history of DM, afib on eliquist, hyperlipidemia, hypothyroid     BP (!) 203/88   Pulse 78   Temp 37.3 °C (99.1 °F) (Temporal)   Resp 17   Ht 1.651 m (5' 5\")   Wt 66.7 kg (147 lb)   SpO2 97%   BMI 24.46 kg/m²     "

## 2024-06-30 NOTE — PROGRESS NOTES
Monitor summary: SR 52-58, DE -0.18, QRS -0.07, QT -0.52, with rare PAC per strip from the monitor room.

## 2024-06-30 NOTE — PROGRESS NOTES
4 Eyes Skin Assessment Completed by ZAK Barker and ZAK Smith.    Head WDL  Ears WDL  Nose WDL  Mouth WDL  Neck WDL  Breast/Chest WDL  Shoulder Blades WDL  Spine WDL  (R) Arm/Elbow/Hand Bruising and Swelling  (L) Arm/Elbow/Hand Bruising and Swelling  Abdomen WDL  Groin WDL  Scrotum/Coccyx/Buttocks Redness and Blanching  (R) Leg Swelling  (L) Leg Swelling  (R) Heel/Foot/Toe WDL  (L) Heel/Foot/Toe WDL          Devices In Places Tele Box, Pulse Ox, and Nasal Cannula      Interventions In Place Gray Ear Foams, Sacral Mepilex, Pillows, and Q2 Turns    Possible Skin Injury Yes    Pictures Uploaded Into Epic Yes  Wound Consult Placed N/A  RN Wound Prevention Protocol Ordered Yes

## 2024-06-30 NOTE — ASSESSMENT & PLAN NOTE
Not a candidate for thoracentesis due to apixaban for Afib  Likely due to decompensated HFpEF  No signs of SIRS to suggest parapneumonic effusion nor empyema  Monitor with repeat CXR after IV diuresis

## 2024-06-30 NOTE — ASSESSMENT & PLAN NOTE
A1c 6.2  Hypoglycemic likely due to depression and anorexia  Monitor POCT q6h, SSI deferred in absence of indication for strict BG control  Unrestricted diet due to anorexia  Continue rosuvastatin  Holding sitagliptin

## 2024-06-30 NOTE — ED NOTES
Unable to complete med rec. Pt is only able to verify her name and date of birth. Pt is unsure of her medications.  Home pharmacy currently closed.   Unable to reach family.

## 2024-06-30 NOTE — ED PROVIDER NOTES
ER Provider Note    Scribed for Caryl Gonzales M.d. by Jacquelyn Regalado. 6/29/2024  5:47 PM    Primary Care Provider: DORI Gomez    CHIEF COMPLAINT   Chief Complaint   Patient presents with    ALOC     Pt biba [employer] Air from home, pt family called due to pt having altered level of consciousness, last known well two weeks ago, pt alert and oriented x2, on arrival pt had blood sugar 39, pt was given oral glucose tab 15mg, blood sugar check after 256, pt has no complains, GCS 14, pt is incontinent of urine, on transport pt was given 4mg zofran, blood sugar check again 151, 174/71, HR 59, history of DM, afib on [facility], hyperlipidemia, hypothyroid     EXTERNAL RECORDS REVIEWED  Outpatient labs & studies patient had an echocardiogram done at [facility] in April 2024 which showed an EF of 70% when she was admitted to afib with RVR with spontaneous conversion back to NSR. Pt seen by [facility] cardiology April 2024. 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  Bypass surgery not recommended due to pts frail condition.  Echo shows mild to moderate AS. It was reported during that visit that pt is requiring wheelchair since her hospitalization at [facility] in april.     Pt seen at [facility] for a fall on 4/21/24.  It was reported by ER MD that pt had a RA oxygen saturation of 85% and that she needed to go home on oxygen.  Oxygen sats during that visit on 2L was 92%. Pt reported low oxygen sats at home of 79% on RA during that visit as well. Urine culture from 4/19 at [facility] grew out E Coli pansensitive to everythign but bactrim and ampicillin.    HPI/ROS  LIMITATION TO HISTORY   Select: : None  OUTSIDE HISTORIAN(S):  None    Nancy King is a 88 y.o. female who presents to the ED complaining of altered mental status which was noted earlier today.  Patient lives at home with her  who is at equal age.  It was reported by EMS that they feel that  is having a hard time caring for the patient.  They do  "not think she has eaten in the last 2 days.  Blood sugar on scene was 39.  She was given oral glucose and blood sugar after her oral glucose was 256.  Patient's mental status improved once her sugar came up.  Patient said she felt ill yesterday.  She said she might of vomited today.  She denies having any pain.  Patient is a relatively poor historian.  There is is very strong smell of urine in the room.  She denies any dysuria.  No abdominal pain.  No chest pain.  No headache.  She says she does not feel sick or ill right now.  Patient has history of atrial fibrillation, diabetes, hyperlipidemia and hypothyroidism. Pt is on eliquis. Per records, Pt was sent home from [facility] in late april 2024 on oxygen for oxygen sats in the mid 80's on RA. Pt arrives with oxygen in place at 2L NC.  Pt states that she normally walk with a walker but records reveal that pt has been in a wheechair since her hospital stay at [facility] in mid april . Pt states she hasnt been walking the last few days but unable to say why.    PAST MEDICAL HISTORY  Past Medical History:   Diagnosis Date    Acute venous embolism and thrombosis of unspecified deep vessels of lower extremity     Angina pectoris 5/11/2012    \"with stress\"    Benign essential HTN 5/11/2012    CAD (coronary artery disease) 5/11/2012    Cancer (HCC)     right breast    Chronic anticoagulation 5/11/2012    Heart burn     Hyperlipidemia 5/11/2012    Hyperlipoproteinemia 5/11/2012    Indigestion     Obesity 5/11/2012    Other specified disorder of intestines     diarrhea    Pulmonary embolism (HCC)     Unspecified cataract     removed bilat    Unspecified disorder of thyroid     Unspecified urinary incontinence        SURGICAL HISTORY  Past Surgical History:   Procedure Laterality Date    MASTECTOMY  1/12/2015    Performed by Dwight Birch M.D. at SURGERY [facility]    NODE BIOPSY SENTINEL  1/12/2015    Performed by Dwight Birch M.D. at SURGERY [facility] ORS    " AXILLARY NODE DISSECTION  1/12/2015    Performed by Dwight Birch M.D. at SURGERY [facility] ORS    APPENDECTOMY      CHOLECYSTECTOMY      HYSTERECTOMY, TOTAL ABDOMINAL      OTHER      Bladder    TONSILLECTOMY AND ADENOIDECTOMY         FAMILY HISTORY  Family History   Problem Relation Age of Onset    Heart Disease Mother     Heart Disease Father        SOCIAL HISTORY   reports that she has never smoked. She has never used smokeless tobacco. She reports current alcohol use. She reports that she does not use drugs.    CURRENT MEDICATIONS  Current Discharge Medication List        CONTINUE these medications which have NOT CHANGED    Details   amiodarone (CORDARONE) 200 MG Tab Take 1 Tablet by mouth every day.  Qty: 90 Tablet, Refills: 3    Associated Diagnoses: Paroxysmal atrial fibrillation (HCC)      furosemide (LASIX) 20 MG Tab Take 20 mg by mouth 1 time a day as needed (shortness of breath, cough, leg swelling).      apixaban (ELIQUIS) 5mg Tab Take 1 Tablet by mouth 2 times a day. Indications: Thromboembolism secondary to Atrial Fibrillation  Qty: 180 Tablet, Refills: 3    Associated Diagnoses: Acute thromboembolism of deep veins of lower extremity, unspecified laterality ([facility])      isosorbide mononitrate SR (IMDUR) 60 MG TABLET SR 24 HR Take 1 Tablet by mouth every morning.  Qty: 90 Tablet, Refills: 3    Associated Diagnoses: NSTEMI (non-ST elevated myocardial infarction) ([facility])      SITagliptin (JANUVIA) 100 MG Tab Take 1 Tablet by mouth every day.  Qty: 90 Tablet, Refills: 3    Comments: Requesting 1 year supply  Associated Diagnoses: Dyslipidemia; Coronary artery disease involving native coronary artery of native heart without angina pectoris; Hyperlipoproteinemia      lisinopril (PRINIVIL) 20 MG Tab Take 1 Tablet by mouth every day.  Qty: 90 Tablet, Refills: 3    Associated Diagnoses: NSTEMI (non-ST elevated myocardial infarction) ([facility])      metoprolol SR (TOPROL XL) 50 MG TABLET SR 24 HR Take 1 Tablet by  "mouth every day.  Qty: 90 Tablet, Refills: 3    Associated Diagnoses: NSTEMI (non-ST elevated myocardial infarction) (HCC)      nitroglycerin (NITROSTAT) 0.4 MG SL Tab DISSOLVE 1 TABLET UNDER THE TONGUE EVERY 5 MINUTES AS  NEEDED FOR CHEST PAIN. MAX  OF 3 TABLETS IN 15 MINUTES. CALL 911 IF PAIN PERSISTS.  Qty: 100 Tablet, Refills: 5    Comments: Requesting 1 year supply  Associated Diagnoses: Coronary artery disease involving native coronary artery of native heart without angina pectoris; Other chest pain      ranolazine (RANEXA) 500 MG TABLET SR 12 HR Take 1 Tablet by mouth 2 times a day.  Qty: 180 Tablet, Refills: 3    Associated Diagnoses: NSTEMI (non-ST elevated myocardial infarction) (HCC)      rosuvastatin (CRESTOR) 20 MG Tab Take 1 Tablet by mouth every evening.  Qty: 90 Tablet, Refills: 3    Associated Diagnoses: NSTEMI (non-ST elevated myocardial infarction) (HCC)      levothyroxine (SYNTHROID) 50 MCG Tab Take 50 mcg by mouth 2 times a day.      acetaminophen (TYLENOL 8 HOUR ARTHRITIS PAIN) 650 MG CR tablet Take 1,300 mg by mouth every 6 hours as needed for Moderate Pain.      B Complex Vitamins (VITAMIN B COMPLEX) Tab Take 1 Tablet by mouth every morning.             ALLERGIES  Sulfa drugs and Levofloxacin    PHYSICAL EXAM  BP (!) 203/88   Pulse 78   Temp 37.3 °C (99.1 °F) (Temporal)   Resp 17   Ht 1.651 m (5' 5\")   Wt 66.7 kg (147 lb)   SpO2 97%   BMI 24.46 kg/m²   Constitutional: Well developed, well nourished; No acute distress; chronically ill-appearing.  Very strong smell of urine coming from the room.  HENT: Normocephalic, atraumatic; Bilateral external ears normal; very dry mucous membranes.  Eyes: PERRL, EOMI, Conjunctiva normal. No discharge.   Neck:  Supple, nontender midline; No stridor; No nuchal rigidity.   Lymphatic: No cervical lymphadenopathy noted.   Cardiovascular: Regular rate and rhythm with a 3 out of 6 systolic murmur heard best at the right and left second intercostal space.   "   Thorax & Lungs: No respiratory distress, decreased breath sounds throughout with some crackles at the bases.. Nontender chest wall. No crepitus or subcutaneous air  Abdomen: Soft, nontender, bowel sounds normal. No obvious masses; No pulsatile masses; no rebound, guarding, or peritoneal signs.   Skin: Good color; warm and dry without rash or petechia.  Poor skin turgor  Back: Nontender, No CVA tenderness.   Extremities: Distal radial, dorsalis pedis, posterior tibial pulses are equal bilaterally; No edema; Nontender calves or saphenous, No cyanosis, No clubbing.   Musculoskeletal: Good range of motion in all major joints. No tenderness to palpation or major deformities noted.   Neurologic: Alert & awake.  No facial asymmetry.  Clear speech.      DIAGNOSTIC STUDIES    EKG/LABS  Results for orders placed or performed during the hospital encounter of 06/29/24   CBC With Differential   Result Value Ref Range    WBC 8.1 4.8 - 10.8 K/uL    RBC 4.60 4.20 - 5.40 M/uL    Hemoglobin 13.5 12.0 - 16.0 g/dL    Hematocrit 41.7 37.0 - 47.0 %    MCV 90.7 81.4 - 97.8 fL    MCH 29.3 27.0 - 33.0 pg    MCHC 32.4 32.2 - 35.5 g/dL    RDW 46.9 35.9 - 50.0 fL    Platelet Count 211 164 - 446 K/uL    MPV 9.7 9.0 - 12.9 fL    Neutrophils-Polys 88.70 (H) 44.00 - 72.00 %    Lymphocytes 4.70 (L) 22.00 - 41.00 %    Monocytes 5.30 0.00 - 13.40 %    Eosinophils 0.00 0.00 - 6.90 %    Basophils 0.20 0.00 - 1.80 %    Immature Granulocytes 1.10 (H) 0.00 - 0.90 %    Nucleated RBC 0.00 0.00 - 0.20 /100 WBC    Neutrophils (Absolute) 7.18 1.82 - 7.42 K/uL    Lymphs (Absolute) 0.38 (L) 1.00 - 4.80 K/uL    Monos (Absolute) 0.43 0.00 - 0.85 K/uL    Eos (Absolute) 0.00 0.00 - 0.51 K/uL    Baso (Absolute) 0.02 0.00 - 0.12 K/uL    Immature Granulocytes (abs) 0.09 0.00 - 0.11 K/uL    NRBC (Absolute) 0.00 K/uL   Comp Metabolic Panel   Result Value Ref Range    Sodium 135 135 - 145 mmol/L    Potassium 4.6 3.6 - 5.5 mmol/L    Chloride 96 96 - 112 mmol/L    Co2 27  20 - 33 mmol/L    Anion Gap 12.0 7.0 - 16.0    Glucose 157 (H) 65 - 99 mg/dL    Bun 16 8 - 22 mg/dL    Creatinine 1.01 0.50 - 1.40 mg/dL    Calcium 9.5 8.5 - 10.5 mg/dL    Correct Calcium 9.2 8.5 - 10.5 mg/dL    AST(SGOT) 17 12 - 45 U/L    ALT(SGPT) 10 2 - 50 U/L    Alkaline Phosphatase 61 30 - 99 U/L    Total Bilirubin 0.7 0.1 - 1.5 mg/dL    Albumin 4.4 3.2 - 4.9 g/dL    Total Protein 6.7 6.0 - 8.2 g/dL    Globulin 2.3 1.9 - 3.5 g/dL    A-G Ratio 1.9 g/dL   HCG Qual Serum   Result Value Ref Range    Beta-Hcg Qualitative Serum Negative Negative   Diagnostic Alcohol   Result Value Ref Range    Diagnostic Alcohol <10.1 <10.1 mg/dL   Urinalysis    Specimen: Urine   Result Value Ref Range    Color DK Yellow     Character Cloudy (A)     Specific Gravity 1.019 <1.035    Ph 6.5 5.0 - 8.0    Glucose Negative Negative mg/dL    Ketones Trace (A) Negative mg/dL    Protein 100 (A) Negative mg/dL    Bilirubin Negative Negative    Urobilinogen, Urine 0.2 Negative    Nitrite Negative Negative    Leukocyte Esterase Trace (A) Negative    Occult Blood Negative Negative    Micro Urine Req Microscopic    Urine Drug Screen (Triage)   Result Value Ref Range    Amphetamines Urine Negative Negative    Barbiturates Negative Negative    Benzodiazepines Negative Negative    Cocaine Metabolite Negative Negative    Fentanyl, Urine Negative Negative    Methadone Negative Negative    Opiates Negative Negative    Oxycodone Negative Negative    Phencyclidine -Pcp Negative Negative    Propoxyphene Negative Negative    Cannabinoid Metab Negative Negative   AMMONIA   Result Value Ref Range    Ammonia <10 (L) 11 - 45 umol/L   Lactic Acid   Result Value Ref Range    Lactic Acid 0.9 0.5 - 2.0 mmol/L   Urine Culture (New)    Specimen: Urine   Result Value Ref Range    Significant Indicator POS (POS)     Source UR     Site -     Culture Result - (A)     Culture Result (A)      Lactose fermenting Gram negative naeem  >100,000 cfu/mL     Blood Culture - Draw  one from central line and one from peripheral site    Specimen: Peripheral; Blood   Result Value Ref Range    Significant Indicator NEG     Source BLD     Site PERIPHERAL     Culture Result       No Growth  Note: Blood cultures are incubated for 5 days and  are monitored continuously.Positive blood cultures  are called to the RN and reported as soon as  they are identified.     Blood Culture - Draw one from central line and one from peripheral site    Specimen: Line; Blood   Result Value Ref Range    Significant Indicator NEG     Source BLD     Site Peripheral     Culture Result       No Growth  Note: Blood cultures are incubated for 5 days and  are monitored continuously.Positive blood cultures  are called to the RN and reported as soon as  they are identified.     proBrain Natriuretic Peptide, NT   Result Value Ref Range    NT-proBNP 7790 (H) 0 - 125 pg/mL   TROPONIN   Result Value Ref Range    Troponin T 33 (H) 6 - 19 ng/L   ESTIMATED GFR   Result Value Ref Range    GFR (CKD-EPI) 54 (A) >60 mL/min/1.73 m 2   URINE MICROSCOPIC (W/UA)   Result Value Ref Range    WBC 2-5 /hpf    RBC 0-2 /hpf    Bacteria Many (A) None /hpf    Epithelial Cells Negative /hpf    Hyaline Cast 0-2 /lpf   PROCALCITONIN   Result Value Ref Range    Procalcitonin 0.16 <0.25 ng/mL   Lactic Acid   Result Value Ref Range    Lactic Acid 1.0 0.5 - 2.0 mmol/L   Basic Metabolic Panel (BMP)   Result Value Ref Range    Sodium 135 135 - 145 mmol/L    Potassium 4.0 3.6 - 5.5 mmol/L    Chloride 93 (L) 96 - 112 mmol/L    Co2 28 20 - 33 mmol/L    Glucose 131 (H) 65 - 99 mg/dL    Bun 17 8 - 22 mg/dL    Creatinine 1.04 0.50 - 1.40 mg/dL    Calcium 9.5 8.5 - 10.5 mg/dL    Anion Gap 14.0 7.0 - 16.0   ESTIMATED GFR   Result Value Ref Range    GFR (CKD-EPI) 52 (A) >60 mL/min/1.73 m 2   MAGNESIUM   Result Value Ref Range    Magnesium 1.9 1.5 - 2.5 mg/dL   EKG (NOW)   Result Value Ref Range    Report       [facility]    Test  Date:  2024  Pt Name:    URSULA MARTÍNEZ                Department: ER  MRN:        [id number]                      Room:       RD 11  Gender:     Female                       Technician: [id number]  :        [id number]                   Requested By:KELLY GONZALES  Order #:    [id number]                    Reading MD: Kelly Gonzales    Measurements  Intervals                                Axis  Rate:       57                           P:          40  RI:         172                          QRS:        15  QRSD:       103                          T:          95  QT:         433  QTc:        422    Interpretive Statements  Sinus bradycardia rate 57  Normal axis  Normal intervals  Mild ST elevation V2  No ST depression  T waves flat throughout  LVH with secondary repolarization abnormality  Compared to ECG 2024 16:43:54    Electronically Signed On 2024 19:00:58 PDT by Kelly Gonzales     POCT glucose device results   Result Value Ref Range    POC Glucose, Blood 162 (H) 65 - 99 mg/dL   POCT glucose device results   Result Value Ref Range    POC Glucose, Blood 130 (H) 65 - 99 mg/dL       I have independently interpreted this EKG    RADIOLOGY/PROCEDURES   The attending emergency physician has independently interpreted the diagnostic imaging associated with this visit and am waiting the final reading from the radiologist.     My preliminary interpretation is a follows: ER MD is reviewed the patient's CT scan.  She appears to have bilateral pleural effusions, right greater than left.  Looks like she has an infiltrate on the right as well.    Radiologist interpretation:  CT-RENAL COLIC EVALUATION(A/P W/O)   Final Result      1.  There is no evidence of nephrolithiasis, urolithiasis, or hydronephrosis.   2.  Probable 4 mm phlebolith in the right hemipelvis as noted above.   3.  Colonic diverticulosis with no evidence of diverticulitis.   4.  Bilateral lower lobe airspace disease is suspicious for multifocal pneumonia with  associated bilateral pleural effusions as described above.   5.  Chronic bony changes as described above.      CT-HEAD W/O   Final Result      1.  There is no acute intracranial hemorrhage or infarct.      2.  White matter lucencies most consistent with small vessel ischemic change versus demyelination or gliosis.      3.  There is cerebral atrophy.               DX-CHEST-PORTABLE (1 VIEW)   Final Result      1.  Enlarged cardiac silhouette with changes of pulmonary edema.   2.  Small right pleural effusion.      EC-ECHOCARDIOGRAM COMPLETE W/O CONT    (Results Pending)       COURSE & MEDICAL DECISION MAKING     ASSESSMENT, COURSE AND PLAN  Care Narrative: Patient presents to the ER via EMS after family called patient due to altered mental status.  Patient apparently lives with her  but her  is of the same age and it was reported by EMS that they do not think he is able to take care of her.  EMS  thought that she had not eaten in 2 days.  Blood sugar was 39.  She was given glucose tablets and blood sugar went up to 256.  Patient's mental status improved as her blood sugar came up.  Pt has DM and is on Jardiance. She was incontinent of urine with a strong smell of urine on her upon arrival to ER.  Cath specimen of urine was immediately obtained and revealed cloudy, foul smelling urine.  Many bacteria found on UA. Since pt was confused/altered upon EMS arrival and is on eliquis, CT head was ordered to eval for head bleed.  No evidence of bleed.  Since urine was infected, pt also underwent CT scan renal stone protocol to be sure pt did not have an infected ureteral stone causing urosepsis.  CT abd / pelvis is neg for stone but reveals bilat pleural effusions with infiltrate at right lung base.  Pt denies any c/o here in the ER.  States that she feels fine.  She denies CP, cough, SOA, abdominal pain, headache, dizziness, vomiting and urinary sxs.  Pt states she does not feel week. Pt is not tachycardic  (although on metoprolol) or hypotensive and is afebrile in ER.  No increased work or breathing or respiratory distress.  WBC normal.  Will tx pt for possible RLL pneumonia with unasyn and zithromax per sepsis protocol but at this time does not appear to be septic and does not need IV fluids per sepsis protocal given normal vitals, elevated BNP, pleural effusions bilaterally and concern for CHF as pt does have cardiac history.  Patient is a DNR/DNI.  She has history of hypertension, atrial fibrillation on Eliquis, diabetes, CAD status post non-STEMI and CHF. Suspect that ALOC on scene was related to hypoglycemia.  Will need to monitor closely as pt is on jardiance, although unclear if pt has been taking her meds for the last few days since medics reports that  has not been able to care for patient and likely hasn't eaten in 2 days.  Pt is awake and alert at this time.  She will be admitted for further eval and work up of her decreased mental status, hypoglycemia, bilat pleural effusions, and suspected UTI and pneumonia.  Discussed case with Dr. Watts, hospitalist on call, and he will evaluate pt for hospitalization.  We discussed possible need for thoracentesis ot be sure effusion on right was not empyema, but pt on eliquis, she is stable, she is not in respiratory distress so any sort of invasive diagnostic thoracentesis can be deferred at this time.       6:08 PM - I evaluated the patient at bedside. She presents to the ED for evaluation of a loss of consciousness. I will be ordering labs and imaging to further evaluate. Patient verbalizes understanding and agreement to this plan of care.     7:03 PM - I paged for the hospitalist at this time.     Infection/sepsis was identified at 2015.  Antibiotics were ordered for pneumonia and UTI.    ADDITIONAL PROBLEM LIST  Problem #1: Altered mental status with blood sugar of 39.  Mental status improved with administration of oral glucose and increase in patient's  blood sugar.    DISPOSITION AND DISCUSSIONS  I have discussed management of the patient with the following physicians and JORI's:  Dr. Watts (Internal medicine)     Discussion of management with other [facility] or appropriate source(s): None     Escalation of care considered, and ultimately not performed: Patient is awake and alert.  She answers all questions.  No respiratory distress.  No need for BiPAP or intubation at this time.    Barriers to care at this time, including but not limited to:  None .     Decision tools and prescription drugs considered including, but not limited to: Antibiotics patient was given Unasyn and Zithromax for pneumonia. .    DISPOSITION:  Patient will be hospitalized by Dr. Watts in guarded condition.    FINAL DIANGOSIS  1. Pneumonia of both lower lobes due to infectious organism Acute   2. Pleural effusion Acute   3. Altered mental status, unspecified altered mental status type Acute   4. Hypoglycemia Acute   5. Acute UTI Acute   6. Elevated brain natriuretic peptide (BNP) level Acute     This dictation has been created using voice recognition software. The accuracy of the dictation is limited by the abilities of the software. I expect there may be some errors of grammar and possibly content. I made every attempt to manually correct the errors within my dictation. However, errors related to voice recognition software may still exist and should be interpreted within the appropriate context.     The note accurately reflects work and decisions made by me.  Caryl Gonzales M.D.  6/30/2024  3:33 AM

## 2024-06-30 NOTE — H&P
"Hospital Medicine History & Physical Note    Date of Service  6/29/2024    Primary Care Physician  LESVIA Gomez.    Consultants  None    Specialist Names: N/A    Code Status  DNAR/DNI    Chief Complaint  Chief Complaint   Patient presents with    ALOC     Pt biba [employer] from home, pt family called due to pt having altered level of consciousness, last known well two weeks ago, pt alert and oriented x2, on arrival pt had blood sugar 39, pt was given oral glucose tab 15mg, blood sugar check after 256, pt has no complains, GCS 14, pt is incontinent of urine, on transport pt was given 4mg zofran, blood sugar check again 151, 174/71, HR 59, history of DM, afib on eliquist, hyperlipidemia, hypothyroid       History of Presenting Illness  Nancy King is a 88 y.o. female with HTN, HFpEF, Afib on apixaban, T2DM, CAD s/p NSTEMI who presented 6/29/2024 with hypoglycemia.    She reports for the past 2 weeks she's been feeling depressed. She's had no appetite without nausea. She has lost weight but unsure how much. She feels she is taking 1 step forward and 2 steps back. She lives with her  and states \"that has to be enough.\" She has been feeling dizzy and has frequent chest pain. The chest pain has been unchanged for many months. She feels short of breath whether upright or laying down. She has a nonproductive cough. She denies bleeding, F/C, bowel/bladder dysfunction, falls, peripheral edema. She was altered this morning prompting family to call EMS. BG was reportedly 39 and she responded well to administration of dextrose.    In the ED she is bradycardic. CBC normal. CMP normal. UA is cloudy with proteinuria and ketonuria but no pyuria. BAL and UDS normal. Troponin 33. Ammonia normal. Lactate normal. BNP 7.79K. CXR demonstrates Right-sided pleural effusion and vascular congestion consistent with pulmonary edema. CTH no acute process, some microvascular disease and atrophy. CT renal negative for abdominal " abnormality but with some infiltrates in bilateral lung files. Received unasyn and azithromycin in the ED. Procalcitonin is negative. EKG demonstrates sinus bradycardia, LVH, Qtc 472.    I discussed the plan of care with patient, bedside RN, charge RN, and ED provider .    Review of Systems  Review of Systems   Constitutional:  Positive for malaise/fatigue. Negative for chills and fever.   HENT:  Negative for ear pain, nosebleeds, sinus pain and sore throat.    Eyes:  Negative for pain.   Respiratory:  Positive for cough and shortness of breath. Negative for hemoptysis and sputum production.    Cardiovascular:  Positive for chest pain. Negative for leg swelling.   Gastrointestinal:  Negative for abdominal pain, blood in stool, constipation, diarrhea, melena, nausea and vomiting.   Genitourinary:  Negative for dysuria, flank pain and hematuria.   Musculoskeletal:  Negative for back pain, falls, joint pain, myalgias and neck pain.   Neurological:  Positive for dizziness. Negative for loss of consciousness and headaches.   Endo/Heme/Allergies:  Does not bruise/bleed easily.   Psychiatric/Behavioral:  Positive for depression. Negative for memory loss. The patient is nervous/anxious.        Past Medical History   has a past medical history of Acute venous embolism and thrombosis of unspecified deep vessels of lower extremity, Angina pectoris (5/11/2012), Benign essential HTN (5/11/2012), CAD (coronary artery disease) (5/11/2012), Cancer (HCC) (), Chronic anticoagulation (5/11/2012), Heart burn, Hyperlipidemia (5/11/2012), Hyperlipoproteinemia (5/11/2012), Indigestion, Obesity (5/11/2012), Other specified disorder of intestines, Pulmonary embolism (HCC), Unspecified cataract, Unspecified disorder of thyroid, and Unspecified urinary incontinence.    Surgical History   has a past surgical history that includes hysterectomy, total abdominal; tonsillectomy and adenoidectomy; cholecystectomy; other; appendectomy;  mastectomy (1/12/2015); node biopsy sentinel (1/12/2015); and axillary node dissection (1/12/2015).     Family History  family history includes Heart Disease in her father and mother.   Family history reviewed with patient. There is family history that is pertinent to the chief complaint.     Social History   reports that she has never smoked. She has never used smokeless tobacco. She reports current alcohol use. She reports that she does not use drugs.    Allergies  Allergies   Allergen Reactions    Sulfa Drugs      Swelling    Levofloxacin Rash     Rash         Medications  Prior to Admission Medications   Prescriptions Last Dose Informant Patient Reported? Taking?   B Complex Vitamins (VITAMIN B COMPLEX) Tab  Significant Other Yes No   Sig: Take 1 Tablet by mouth every morning.   SITagliptin (JANUVIA) 100 MG Tab   No No   Sig: Take 1 Tablet by mouth every day.   acetaminophen (TYLENOL 8 HOUR ARTHRITIS PAIN) 650 MG CR tablet  Significant Other Yes No   Sig: Take 1,300 mg by mouth every 6 hours as needed for Moderate Pain.   amiodarone (CORDARONE) 200 MG Tab   No No   Sig: Take 1 Tablet by mouth every day.   apixaban (ELIQUIS) 5mg Tab   No No   Sig: Take 1 Tablet by mouth 2 times a day. Indications: Thromboembolism secondary to Atrial Fibrillation   furosemide (LASIX) 20 MG Tab   Yes No   Sig: Take 20 mg by mouth 1 time a day as needed (shortness of breath, cough, leg swelling).   isosorbide mononitrate SR (IMDUR) 60 MG TABLET SR 24 HR   No No   Sig: Take 1 Tablet by mouth every morning.   levothyroxine (SYNTHROID) 50 MCG Tab  Significant Other Yes No   Sig: Take 50 mcg by mouth 2 times a day.   lisinopril (PRINIVIL) 20 MG Tab   No No   Sig: Take 1 Tablet by mouth every day.   metoprolol SR (TOPROL XL) 50 MG TABLET SR 24 HR   No No   Sig: Take 1 Tablet by mouth every day.   nitroglycerin (NITROSTAT) 0.4 MG SL Tab   No No   Sig: DISSOLVE 1 TABLET UNDER THE TONGUE EVERY 5 MINUTES AS  NEEDED FOR CHEST PAIN. MAX  OF 3  TABLETS IN 15 MINUTES. CALL 911 IF PAIN PERSISTS.   ranolazine (RANEXA) 500 MG TABLET SR 12 HR   No No   Sig: Take 1 Tablet by mouth 2 times a day.   rosuvastatin (CRESTOR) 20 MG Tab   No No   Sig: Take 1 Tablet by mouth every evening.      Facility-Administered Medications: None       Physical Exam  Temp:  [37.3 °C (99.1 °F)] 37.3 °C (99.1 °F)  Pulse:  [] 57  Resp:  [13-19] 14  BP: (167-217)/() 212/81  SpO2:  [96 %-99 %] 96 %  Blood Pressure : (!) 212/81   Temperature: 37.3 °C (99.1 °F)   Pulse: (!) 57   Respiration: 14   Pulse Oximetry: 96 %       Physical Exam  Vitals and nursing note reviewed. Exam conducted with a chaperone present (Primary and Charge RN at bedside).   Constitutional:       Interventions: Nasal cannula in place.   HENT:      Head:      Comments: Bitemporal wasting     Nose: Nose normal.      Mouth/Throat:      Mouth: Mucous membranes are dry.   Eyes:      General: No scleral icterus.     Conjunctiva/sclera: Conjunctivae normal.   Cardiovascular:      Rate and Rhythm: Normal rate and regular rhythm.      Pulses: Normal pulses.      Heart sounds: Murmur (2/6 systolic at RUSB) heard.      No friction rub. No gallop.   Pulmonary:      Effort: Pulmonary effort is normal. No respiratory distress.      Breath sounds: Normal breath sounds. No wheezing, rhonchi or rales.   Abdominal:      General: Abdomen is flat. Bowel sounds are normal. There is no distension.      Palpations: Abdomen is soft.      Tenderness: There is no abdominal tenderness. There is no guarding or rebound.   Genitourinary:     Comments: No lan  Musculoskeletal:      Cervical back: Neck supple.      Right lower leg: Edema present.      Left lower leg: Edema present.      Comments: 2+ pretibial   Skin:     General: Skin is warm and dry.   Neurological:      Mental Status: She is alert.      Comments: Appropriately conversant, moves all extremities   Psychiatric:         Attention and Perception: Attention and perception  normal.         Mood and Affect: Mood is depressed. Affect is flat.         Speech: Speech is delayed.         Behavior: Behavior is slowed. Behavior is cooperative.         Thought Content: Thought content normal.         Cognition and Memory: Cognition and memory normal.         Judgment: Judgment normal.         Laboratory:  Recent Labs     06/29/24  1720   WBC 8.1   RBC 4.60   HEMOGLOBIN 13.5   HEMATOCRIT 41.7   MCV 90.7   MCH 29.3   MCHC 32.4   RDW 46.9   PLATELETCT 211   MPV 9.7     Recent Labs     06/29/24  1720   SODIUM 135   POTASSIUM 4.6   CHLORIDE 96   CO2 27   GLUCOSE 157*   BUN 16   CREATININE 1.01   CALCIUM 9.5     Recent Labs     06/29/24  1720   ALTSGPT 10   ASTSGOT 17   ALKPHOSPHAT 61   TBILIRUBIN 0.7   GLUCOSE 157*         Recent Labs     06/29/24  1720   NTPROBNP 7790*         Recent Labs     06/29/24  1720   TROPONINT 33*       Imaging:  CT-RENAL COLIC EVALUATION(A/P W/O)   Final Result      1.  There is no evidence of nephrolithiasis, urolithiasis, or hydronephrosis.   2.  Probable 4 mm phlebolith in the right hemipelvis as noted above.   3.  Colonic diverticulosis with no evidence of diverticulitis.   4.  Bilateral lower lobe airspace disease is suspicious for multifocal pneumonia with associated bilateral pleural effusions as described above.   5.  Chronic bony changes as described above.      CT-HEAD W/O   Final Result      1.  There is no acute intracranial hemorrhage or infarct.      2.  White matter lucencies most consistent with small vessel ischemic change versus demyelination or gliosis.      3.  There is cerebral atrophy.               DX-CHEST-PORTABLE (1 VIEW)   Final Result      1.  Enlarged cardiac silhouette with changes of pulmonary edema.   2.  Small right pleural effusion.          X-Ray:  I have personally reviewed the images and compared with prior images. and My impression is: pulmonary vascular congestion with Right pleural effusion  EKG:  I have personally reviewed the  images and compared with prior images. and My impression is: sinus bradycardia, no ischemic changes    Assessment/Plan:  Justification for Admission Status  I anticipate this patient will require at least two midnights for appropriate medical management, necessitating inpatient admission because cautious diuresis in setting of poor PO intake,     Patient will need a Med/Surg bed on MEDICAL service .  The need is secondary to IV diuresis, hypoglycemia monitoring.    * Acute encephalopathy- (present on admission)  Assessment & Plan  Resolved  Likely due to hypoglycemia from depression and inadequate PO intake  Bcx drawn in ED 6/29 - NGTD  UA not indicative of infection    Severe episode of recurrent major depressive disorder, without psychotic features (HCC)- (present on admission)  Assessment & Plan  Attributed to symptomatic coronary artery disease  Causing anorexia and anhedonia sufficient for hypoglycemia and dehydration  Psychology consult  Family support TBD tomorrow when son visits  Can initiate SSRI if amenable    Pleural effusion on right- (present on admission)  Assessment & Plan  Not a candidate for thoracentesis due to apixaban for Afib  Likely due to decompensated HFpEF  No signs of SIRS to suggest parapneumonic effusion nor empyema  Monitor with repeat CXR after IV diuresis    Pulmonary infiltrates- (present on admission)  Assessment & Plan  Noted on CT chest  Likely pulmonary edema  Procalcitonin negative - no expected benefit of CAP antibiotics    Acquired hypothyroidism- (present on admission)  Assessment & Plan  Continue levothyroxine    Moderate protein-calorie malnutrition (HCC)- (present on admission)  Assessment & Plan  Unclear weight loss amount, bitemporal wasting with anorexia  RD consult    Acute on chronic heart failure with preserved ejection fraction (HCC)- (present on admission)  Assessment & Plan  Seemingly decompensated by pleural effusion, peripheral edema, elevated BNP  CXR  demonstrates pulmonary vascular congestion / pulmonary edema  Continue metoprolol    Paroxysmal atrial fibrillation (HCC)- (present on admission)  Assessment & Plan  Continue amiodarone, metoprolol, apixaban    Type 2 diabetes mellitus with hypoglycemia without coma, without long-term current use of insulin (HCC)- (present on admission)  Assessment & Plan  A1c 6.2  Hypoglycemic likely due to depression and anorexia  Monitor POCT q6h, SSI deferred in absence of indication for strict BG control  Unrestricted diet due to anorexia  Continue rosuvastatin  Holding sitagliptin      Primary hypertension- (present on admission)  Assessment & Plan  Continue cardiac meds per CAD plan    Coronary artery disease of native artery of native heart with stable angina pectoris (HCC)- (present on admission)  Assessment & Plan  Stable  Due to multivessel CAD which was not amenable to PCI, not a candidate for CABG  EKG and troponin not indicative of acute ischemia  Continue lisinopril, apixaban, ranolazine, imdur, rosuvastatin, metoprolol  SLN, EKG, troponin PRN chest pain        VTE prophylaxis: SCDs/TEDs and therapeutic anticoagulation with apixaban

## 2024-06-30 NOTE — PROGRESS NOTES
Hospital Medicine Daily Progress Note    Date of Service  6/30/2024    Chief Complaint  Nancy King is a 88 y.o. female with HTN, HFpEF, Afib on apixaban, T2DM, CAD s/p NSTEMI admitted 6/29/2024 with hypogycemia and decompensated HFpEF.    Hospital Course  No notes on file    Interval Problem Update    OSITO ON  She doesn't have much appetite this morning.  She does not feel like she is urinating frequently from lasix.  She was very tired and remembers little of her admission to the hospital or our encounter.  I reminded her of her comments that she feels hopeless, wants more good days, have support of her , and is taking 2 steps back for every step forward.  She affirmed these are true.  She is agreeable to psychology consult and pharmacotherapy.  Started sertraline.  She denies chest pain, dyspnea, other pain, N/V.  Per RN was max assist to get up.   PT/OT ordered.  She is looking forward to her son visiting. He has a good sense of humor.    BMP reviewed, normal.  Mg normal.    POC discussed with psychologist Dr. Navarrete. She will assess today.    I have discussed this patient's plan of care and discharge plan at IDT rounds today with Case Management, Nursing, Nursing leadership, and other members of the IDT team.    Consultants/Specialty  psychology    Code Status  DNAR/DNI    Disposition  The patient is not medically cleared for discharge to home or a post-acute facility.  Anticipate discharge to: skilled nursing facility    I have placed the appropriate orders for post-discharge needs.    Review of Systems  Review of Systems   Constitutional:  Negative for chills and fever.   Respiratory:  Negative for shortness of breath.    Cardiovascular:  Negative for chest pain.   Gastrointestinal:  Negative for abdominal pain, nausea and vomiting.   Musculoskeletal:  Negative for back pain, myalgias and neck pain.   Neurological:  Negative for headaches.   Psychiatric/Behavioral:  Positive for depression.          Physical Exam  Temp:  [36.2 °C (97.2 °F)-37.3 °C (99.1 °F)] 36.6 °C (97.9 °F)  Pulse:  [] 52  Resp:  [13-19] 16  BP: (139-217)/() 165/66  SpO2:  [94 %-99 %] 97 %    Physical Exam  Vitals and nursing note reviewed.   Constitutional:       Interventions: Nasal cannula in place.      Comments: Sleeping, easily awakened   HENT:      Head:      Comments: Bitemporal wasting     Nose: Nose normal.   Eyes:      General: No scleral icterus.     Conjunctiva/sclera: Conjunctivae normal.   Cardiovascular:      Rate and Rhythm: Normal rate and regular rhythm.      Pulses: Normal pulses.      Heart sounds: Murmur (2/6 systolic, does not radiate to carotids) heard.      No friction rub. No gallop.   Pulmonary:      Effort: Pulmonary effort is normal. No respiratory distress.      Breath sounds: Normal breath sounds. No wheezing, rhonchi or rales.   Abdominal:      General: Abdomen is flat. Bowel sounds are normal. There is no distension.      Palpations: Abdomen is soft.      Tenderness: There is no abdominal tenderness. There is no guarding or rebound.   Genitourinary:     Comments: No lan  Musculoskeletal:      Cervical back: Neck supple.      Right lower leg: Edema present.      Left lower leg: Edema present.      Comments: 2+ pretibial   Skin:     General: Skin is warm and dry.   Neurological:      Comments: Appropriately conversant, moves all extremities   Psychiatric:         Attention and Perception: Attention and perception normal.         Mood and Affect: Affect normal. Mood is depressed.         Speech: Speech normal.         Behavior: Behavior is slowed. Behavior is cooperative.         Thought Content: Thought content normal.         Cognition and Memory: Cognition and memory normal.         Judgment: Judgment normal.         Fluids    Intake/Output Summary (Last 24 hours) at 6/30/2024 1761  Last data filed at 6/30/2024 0353  Gross per 24 hour   Intake --   Output 400 ml   Net -400 ml        Laboratory  Recent Labs     06/29/24  1720   WBC 8.1   RBC 4.60   HEMOGLOBIN 13.5   HEMATOCRIT 41.7   MCV 90.7   MCH 29.3   MCHC 32.4   RDW 46.9   PLATELETCT 211   MPV 9.7     Recent Labs     06/29/24  1720 06/30/24  0426   SODIUM 135 135   POTASSIUM 4.6 4.0   CHLORIDE 96 93*   CO2 27 28   GLUCOSE 157* 131*   BUN 16 17   CREATININE 1.01 1.04   CALCIUM 9.5 9.5                   Imaging  CT-RENAL COLIC EVALUATION(A/P W/O)   Final Result      1.  There is no evidence of nephrolithiasis, urolithiasis, or hydronephrosis.   2.  Probable 4 mm phlebolith in the right hemipelvis as noted above.   3.  Colonic diverticulosis with no evidence of diverticulitis.   4.  Bilateral lower lobe airspace disease is suspicious for multifocal pneumonia with associated bilateral pleural effusions as described above.   5.  Chronic bony changes as described above.      CT-HEAD W/O   Final Result      1.  There is no acute intracranial hemorrhage or infarct.      2.  White matter lucencies most consistent with small vessel ischemic change versus demyelination or gliosis.      3.  There is cerebral atrophy.               DX-CHEST-PORTABLE (1 VIEW)   Final Result      1.  Enlarged cardiac silhouette with changes of pulmonary edema.   2.  Small right pleural effusion.           Assessment/Plan  * Acute on chronic heart failure with preserved ejection fraction (HCC)- (present on admission)  Assessment & Plan  Seemingly decompensated by pleural effusion, pulmonary edema with AHRF, peripheral edema, elevated BNP  CXR demonstrates pulmonary vascular congestion / pulmonary edema  Continue metoprolol  Lasix IV 40 mg BID  TTE ordered for assessment of ischemic cardiomyopathy    Severe episode of recurrent major depressive disorder, without psychotic features (HCC)- (present on admission)  Assessment & Plan  Attributed to symptomatic coronary artery disease  Causing anorexia and anhedonia sufficient for hypoglycemia and dehydration  Psychology  consult  Family support TBD tomorrow when son visits  Agreeable to initiation of sertraline (SAD JACK)    Acute respiratory failure with hypoxia (HCC)- (present on admission)  Assessment & Plan  Due to acute cardiogenic pulmonary edema from decompensated HFpEF  IV diuresis  Wean as able, IS    Pleural effusion on right- (present on admission)  Assessment & Plan  Not a candidate for thoracentesis due to apixaban for Afib  Likely due to decompensated HFpEF  No signs of SIRS to suggest parapneumonic effusion nor empyema  Monitor with repeat CXR after IV diuresis    Pulmonary infiltrates- (present on admission)  Assessment & Plan  Noted on CT chest  Likely pulmonary edema  Procalcitonin negative - no expected benefit of CAP antibiotics    Acquired hypothyroidism- (present on admission)  Assessment & Plan  Continue levothyroxine    Moderate protein-calorie malnutrition (HCC)- (present on admission)  Assessment & Plan  Unclear weight loss amount, bitemporal wasting with anorexia  RD consult    Acute encephalopathy- (present on admission)  Assessment & Plan  Resolved  Likely due to hypoglycemia from depression and inadequate PO intake  Bcx drawn in ED 6/29 - NGTD  UA not indicative of infection    Paroxysmal atrial fibrillation (HCC)- (present on admission)  Assessment & Plan  Continue amiodarone, metoprolol, apixaban    Type 2 diabetes mellitus with hypoglycemia without coma, without long-term current use of insulin (HCC)- (present on admission)  Assessment & Plan  A1c 6.2  Hypoglycemic likely due to depression and anorexia  Monitor POCT q6h, SSI deferred in absence of indication for strict BG control  Unrestricted diet due to anorexia  Continue rosuvastatin  Holding sitagliptin      Primary hypertension- (present on admission)  Assessment & Plan  Continue cardiac meds per CAD plan    Coronary artery disease of native artery of native heart with stable angina pectoris (HCC)- (present on admission)  Assessment &  Plan  Stable  Due to multivessel CAD which was not amenable to PCI, not a candidate for CABG  EKG and troponin not indicative of acute ischemia  Continue lisinopril, apixaban, ranolazine, imdur, rosuvastatin, metoprolol  SLN, EKG, troponin PRN chest pain      VTE prophylaxis:    therapeutic anticoagulation with eliquis 5 mg BID    I have performed a physical exam and reviewed and updated ROS and Plan today (6/30/2024). In review of yesterday's note (6/29/2024), there are no changes except as documented above.

## 2024-06-30 NOTE — ASSESSMENT & PLAN NOTE
Seemingly decompensated by pleural effusion, pulmonary edema with AHRF, peripheral edema, elevated BNP  CXR demonstrates pulmonary vascular congestion / pulmonary edema  Continue metoprolol  Lasix IV 40 mg BID  TTE ordered for assessment of ischemic cardiomyopathy

## 2024-06-30 NOTE — ED NOTES
Bedside report received from Topher CRESPO. Pt on cardiac monitor, pulse ox, and automatic BP. Fall precautions in place. Call light within reach. Pt connected to 2lpm O2 via NC

## 2024-06-30 NOTE — ED NOTES
Spoke with pts daughter in law who states pt health has been deteriorating in the last two weeks, states that pt has not been eating and refuses to drink fluids, daughter states pts son will be here in the morning to visit.

## 2024-06-30 NOTE — ASSESSMENT & PLAN NOTE
Resolved  Likely due to hypoglycemia from depression and inadequate PO intake  Bcx drawn in ED 6/29 - NGTD  UA not indicative of infection

## 2024-06-30 NOTE — ASSESSMENT & PLAN NOTE
Noted on CT chest  Likely pulmonary edema  Procalcitonin negative - no expected benefit of CAP antibiotics

## 2024-06-30 NOTE — ASSESSMENT & PLAN NOTE
Stable  Due to multivessel CAD which was not amenable to PCI, not a candidate for CABG  EKG and troponin not indicative of acute ischemia  Continue lisinopril, apixaban, ranolazine, imdur, rosuvastatin, metoprolol  SLN, EKG, troponin PRN chest pain

## 2024-07-01 ENCOUNTER — APPOINTMENT (OUTPATIENT)
Dept: CARDIOLOGY | Facility: MEDICAL CENTER | Age: 89
DRG: 291 | End: 2024-07-01
Attending: STUDENT IN AN ORGANIZED HEALTH CARE EDUCATION/TRAINING PROGRAM
Payer: MEDICARE

## 2024-07-01 PROBLEM — R41.0 DELIRIUM: Status: ACTIVE | Noted: 2024-07-01

## 2024-07-01 LAB
ANION GAP SERPL CALC-SCNC: 14 MMOL/L (ref 7–16)
BACTERIA UR CULT: ABNORMAL
BACTERIA UR CULT: ABNORMAL
BUN SERPL-MCNC: 24 MG/DL (ref 8–22)
CALCIUM SERPL-MCNC: 9.1 MG/DL (ref 8.5–10.5)
CHLORIDE SERPL-SCNC: 91 MMOL/L (ref 96–112)
CO2 SERPL-SCNC: 30 MMOL/L (ref 20–33)
CREAT SERPL-MCNC: 1.12 MG/DL (ref 0.5–1.4)
GFR SERPLBLD CREATININE-BSD FMLA CKD-EPI: 48 ML/MIN/1.73 M 2
GLUCOSE SERPL-MCNC: 124 MG/DL (ref 65–99)
LV EJECT FRACT  99904: 65
LV EJECT FRACT MOD 2C 99903: 74.45
LV EJECT FRACT MOD 4C 99902: 85.55
LV EJECT FRACT MOD BP 99901: 81.18
MAGNESIUM SERPL-MCNC: 1.9 MG/DL (ref 1.5–2.5)
POTASSIUM SERPL-SCNC: 3.6 MMOL/L (ref 3.6–5.5)
SIGNIFICANT IND 70042: ABNORMAL
SITE SITE: ABNORMAL
SODIUM SERPL-SCNC: 135 MMOL/L (ref 135–145)
SOURCE SOURCE: ABNORMAL

## 2024-07-01 PROCEDURE — 770020 HCHG ROOM/CARE - TELE (206)

## 2024-07-01 PROCEDURE — 700117 HCHG RX CONTRAST REV CODE 255: Performed by: INTERNAL MEDICINE

## 2024-07-01 PROCEDURE — 700102 HCHG RX REV CODE 250 W/ 637 OVERRIDE(OP): Performed by: STUDENT IN AN ORGANIZED HEALTH CARE EDUCATION/TRAINING PROGRAM

## 2024-07-01 PROCEDURE — 93306 TTE W/DOPPLER COMPLETE: CPT | Mod: 26 | Performed by: INTERNAL MEDICINE

## 2024-07-01 PROCEDURE — 97167 OT EVAL HIGH COMPLEX 60 MIN: CPT

## 2024-07-01 PROCEDURE — 80048 BASIC METABOLIC PNL TOTAL CA: CPT

## 2024-07-01 PROCEDURE — 700111 HCHG RX REV CODE 636 W/ 250 OVERRIDE (IP): Mod: JZ | Performed by: STUDENT IN AN ORGANIZED HEALTH CARE EDUCATION/TRAINING PROGRAM

## 2024-07-01 PROCEDURE — 99232 SBSQ HOSP IP/OBS MODERATE 35: CPT | Performed by: STUDENT IN AN ORGANIZED HEALTH CARE EDUCATION/TRAINING PROGRAM

## 2024-07-01 PROCEDURE — A9270 NON-COVERED ITEM OR SERVICE: HCPCS | Performed by: STUDENT IN AN ORGANIZED HEALTH CARE EDUCATION/TRAINING PROGRAM

## 2024-07-01 PROCEDURE — 36415 COLL VENOUS BLD VENIPUNCTURE: CPT

## 2024-07-01 PROCEDURE — 83735 ASSAY OF MAGNESIUM: CPT

## 2024-07-01 PROCEDURE — 97163 PT EVAL HIGH COMPLEX 45 MIN: CPT

## 2024-07-01 PROCEDURE — 93306 TTE W/DOPPLER COMPLETE: CPT

## 2024-07-01 RX ORDER — TRAZODONE HYDROCHLORIDE 100 MG/1
100 TABLET ORAL
Status: DISCONTINUED | OUTPATIENT
Start: 2024-07-01 | End: 2024-07-05 | Stop reason: HOSPADM

## 2024-07-01 RX ADMIN — RANOLAZINE 500 MG: 500 TABLET, EXTENDED RELEASE ORAL at 17:18

## 2024-07-01 RX ADMIN — SERTRALINE HYDROCHLORIDE 50 MG: 50 TABLET ORAL at 05:05

## 2024-07-01 RX ADMIN — LEVOTHYROXINE SODIUM 50 MCG: 0.05 TABLET ORAL at 05:06

## 2024-07-01 RX ADMIN — HUMAN ALBUMIN MICROSPHERES AND PERFLUTREN 3 ML: 10; .22 INJECTION, SOLUTION INTRAVENOUS at 14:00

## 2024-07-01 RX ADMIN — Medication 10 MG: at 21:51

## 2024-07-01 RX ADMIN — FUROSEMIDE 40 MG: 10 INJECTION, SOLUTION INTRAVENOUS at 05:06

## 2024-07-01 RX ADMIN — LEVOTHYROXINE SODIUM 50 MCG: 0.05 TABLET ORAL at 17:18

## 2024-07-01 RX ADMIN — ISOSORBIDE MONONITRATE 60 MG: 60 TABLET, EXTENDED RELEASE ORAL at 05:05

## 2024-07-01 RX ADMIN — LISINOPRIL 20 MG: 20 TABLET ORAL at 05:05

## 2024-07-01 RX ADMIN — TRAZODONE HYDROCHLORIDE 100 MG: 100 TABLET ORAL at 21:51

## 2024-07-01 RX ADMIN — APIXABAN 5 MG: 5 TABLET, FILM COATED ORAL at 05:05

## 2024-07-01 RX ADMIN — AMIODARONE HYDROCHLORIDE 200 MG: 200 TABLET ORAL at 05:05

## 2024-07-01 RX ADMIN — APIXABAN 5 MG: 5 TABLET, FILM COATED ORAL at 17:18

## 2024-07-01 RX ADMIN — FUROSEMIDE 40 MG: 10 INJECTION, SOLUTION INTRAVENOUS at 17:19

## 2024-07-01 RX ADMIN — RANOLAZINE 500 MG: 500 TABLET, EXTENDED RELEASE ORAL at 05:06

## 2024-07-01 RX ADMIN — METOPROLOL SUCCINATE 50 MG: 25 TABLET, EXTENDED RELEASE ORAL at 05:06

## 2024-07-01 RX ADMIN — ROSUVASTATIN CALCIUM 20 MG: 20 TABLET, FILM COATED ORAL at 17:18

## 2024-07-01 ASSESSMENT — COGNITIVE AND FUNCTIONAL STATUS - GENERAL
DRESSING REGULAR LOWER BODY CLOTHING: A LOT
DRESSING REGULAR UPPER BODY CLOTHING: A LOT
EATING MEALS: A LITTLE
DAILY ACTIVITIY SCORE: 14
SUGGESTED CMS G CODE MODIFIER MOBILITY: CL
MOVING TO AND FROM BED TO CHAIR: A LOT
PERSONAL GROOMING: A LITTLE
MOBILITY SCORE: 11
WALKING IN HOSPITAL ROOM: A LOT
CLIMB 3 TO 5 STEPS WITH RAILING: TOTAL
TURNING FROM BACK TO SIDE WHILE IN FLAT BAD: A LOT
TOILETING: A LOT
MOVING FROM LYING ON BACK TO SITTING ON SIDE OF FLAT BED: A LOT
STANDING UP FROM CHAIR USING ARMS: A LOT
SUGGESTED CMS G CODE MODIFIER DAILY ACTIVITY: CK
HELP NEEDED FOR BATHING: A LOT

## 2024-07-01 ASSESSMENT — ENCOUNTER SYMPTOMS
NAUSEA: 0
CHILLS: 0
FEVER: 0
MYALGIAS: 0
NECK PAIN: 0
DEPRESSION: 1
ABDOMINAL PAIN: 0
BACK PAIN: 0
VOMITING: 0
HEADACHES: 0
SHORTNESS OF BREATH: 0

## 2024-07-01 ASSESSMENT — ACTIVITIES OF DAILY LIVING (ADL): TOILETING: INDEPENDENT

## 2024-07-01 ASSESSMENT — PAIN DESCRIPTION - PAIN TYPE
TYPE: ACUTE PAIN
TYPE: ACUTE PAIN

## 2024-07-01 ASSESSMENT — FIBROSIS 4 INDEX: FIB4 SCORE: 2.19

## 2024-07-01 ASSESSMENT — GAIT ASSESSMENTS: GAIT LEVEL OF ASSIST: UNABLE TO PARTICIPATE

## 2024-07-01 NOTE — DIETARY
"Nutrition services: Day 2 of admit.  Nancy King is a 88 y.o. female with admitting DX of acute encephalopathy and acute on chronic HFrEF.     Consult received for unexplained weight loss, admit screen with MST 3 with reported weight loss and poor PO intake, and malnutrition on problem list.     Assessment:  Height: 165.1 cm (5' 5\")  Weight: 56.3 kg (124 lb 1.9 oz) - bed scale  Body mass index is 20.65 kg/m²., BMI classification: normal  Diet/Intake: Regular, pt is eating 25-50% of meals on average in 2 day admit    Evaluation:   PMH of HTN, CHF, T2DM, CAD s/p NSTEMI who presents with hypoglycemia.   Pt just had code assist called to room, unable to see at bedside at this time. NFPE not feasible at this time, MD does note bitemporal wastigng with anorexia.   Per psych visit, pt reports being depressed past 2 weeks, she's had no appetite, has lost weight and unsure how much. Noted pt is wheelchair bound.   Per admit screen, pt with unsure weight loss and poor PO intake PTA.   Per weight hx, pt has had decreasing weight loss over last 1 year from weight of 201 lbs on 7/15/23 to 165 lbs on 8/14/23 to 140 lbs on 10/4/23 to current weight of 124 lbs. This indicates 77 lbs (38.3%) severe weight loss in 1 year.   Skin: pressure injury to coccyx, no edema noted.   Labs: Glucose 124, BUN 24, GFR 48  Meds: laix 40 mg BID, synthroid, crestor, anti-emetics prn,     Malnutrition Risk: Unable to fully assess, pt does meet severe weight loss criteria with 38.3% weight loss in 1 year.     Recommendations/Plan:  Regular diet, will order TwoCal shakes with meals to promote adequate kcal and protein intake, and maintain healthy body weight.    Encourage intake of meals and supplements.   Document intake of all meals and supplements as % taken in ADL's to provide interdisciplinary communication across all shifts.   Monitor weight.    RD following.   "

## 2024-07-01 NOTE — THERAPY
Occupational Therapy   Initial Evaluation     Patient Name: Nancy King  Age:  88 y.o., Sex:  female  Medical Record #: [id number]  Today's Date: 7/1/2024    Precautions: Fall Risk    Assessment  Patient is 88 y.o. female admitted after family noticed pt having ALOC. Pt was found to be hypoglycemic and have decompensated HFpEF. PMHx of HTN, HFpEF, Afib on apixaban, T2DM, and CAD s/p NSTEMI. Pt seen for OT eval. Pt currently resides with her  in a 2-story house and was independent with ADLs. Pt reports that recently she has been requiring increased assist to complete IADL tasks.     During OT eval, pt presented with generalized weakness and fatigue leading to deficits in self-care tasks, balance, functional mobility, and activity tolerance. She required max A to mobilize to the EOB and maintain sitting balance to complete UE/LE ROM/strength testing. She required max A to complete partial STS. Declined any further OOB ADLs due to fatigue. When discussing DC recommendations, pt reported that she would prefer to return home but is open to post-acute placement for further rehab prior to DC home. Will continue to follow for ongoing acute OT services.     Plan    Occupational Therapy Initial Treatment Plan   Treatment Interventions: Self Care / Activities of Daily Living, Adaptive Equipment, Neuro Re-Education / Balance, Therapeutic Exercises, Therapeutic Activity, Family / Caregiver Training  Treatment Frequency: 3 Times per Week  Duration: Until Therapy Goals Met    DC Equipment Recommendations: Unable to determine at this time  Discharge Recommendations: Recommend post-acute placement for additional occupational therapy services prior to discharge home     Objective      Prior Living Situation   Prior Services Home-Independent   Housing / Facility 2 Story House   Steps Into Home 3   Steps In Home   (FOS, however pt has a chair lift to access bedroom on 2nd floor)   Bathroom Set up Walk In Shower;Grab  "Bars;Shower Chair   Equipment Owned Front-Wheel Walker;Wheelchair;Tub / Shower Seat;Grab Bar(s) In Tub / Shower;Adjustable Bed Without Rails;Other (Comments)  (Chair lift)   Lives with - Patient's Self Care Capacity Spouse   Comments Pt currently resides with her  who is able to provide assist. Pt reports that her son and DIL live local and may be able to provide assist. Per previous note, pt's spouse utilizes a FWW and is limited in the level of assist he is able to provide.   Prior Level of ADL Function   Self Feeding Independent   Grooming / Hygiene Independent   Bathing Independent   Dressing Independent   Toileting Independent   Prior Level of IADL Function   Medication Management Requires Assist   Laundry Requires Assist   Kitchen Mobility Requires Assist   Finances Requires Assist   Home Management Requires Assist   Shopping Requires Assist   Prior Level Of Mobility Independent With Device in Community;Independent With Device in Home   Driving / Transportation Relatives / Others Provide Transportation   Comments Pt reports that she has been doing \"less and less\" and her  has been having to provide increased assist.   Precautions   Precautions Fall Risk   Vitals   O2 (LPM) 2   O2 Delivery Device Silicone Nasal Cannula   Pain 0 - 10 Group   Therapist Pain Assessment Post Activity Pain Same as Prior to Activity;Nurse Notified  (Not rated, agreeable to activity)   Cognition    Cognition / Consciousness X   Speech/ Communication Delayed Responses   Level of Consciousness Alert   Sequencing Impaired   Initiation Impaired   Comments Pleasant and cooperative   Active ROM Upper Body   Active ROM Upper Body  X   Dominant Hand Right   Comments Distal ROM and L shoulder flexion.abduction WFL; R shoulder flexion/abduction limited to ~85 degrees.   Strength Upper Body   Upper Body Strength  X   Gross Strength Generalized Weakness, Equal Bilaterally.    Balance Assessment   Sitting Balance (Static) Fair "   Sitting Balance (Dynamic) Fair -   Standing Balance (Static) Poor -   Standing Balance (Dynamic) Trace +   Weight Shift Sitting Fair   Weight Shift Standing Poor   Comments Only able to achieve a partial standing position before needing to return to seated postion at EOB   Bed Mobility    Supine to Sit Maximal Assist   Sit to Supine Maximal Assist   Scooting Maximal Assist   Rolling Maximal Assist to Rt.;Maximum Assist to Lt.   Comments HOB slightly elevated   ADL Assessment   Grooming Minimal Assist  (bed level; wash face)   Lower Body Dressing Maximal Assist   Toileting Maximal Assist   How much help from another person does the patient currently need...   6 Clicks Daily Activity Score 14   Functional Mobility   Sit to Stand Maximal Assist   Bed, Chair, Wheelchair Transfer Unable to Participate   Mobility EOB with STS x1; unable to achieve a full stand   Activity Tolerance   Sitting Edge of Bed 4 min   Standing 30 seconds   Comments Limited by fatigue   Patient / Family Goals   Patient / Family Goal #1 To go home   Short Term Goals   Short Term Goal # 1 Pt will complete ADL txfs with min A   Short Term Goal # 2 Pt will complete LB dressing with min A using AE PRN   Short Term Goal # 3 Pt will complete toileting ADLs with min A   Short Term Goal # 4 Pt will complete g/h routine seated at EOB with supv   Education Group   Education Provided Role of Occupational Therapist   Role of Occupational Therapist Patient Response Patient;Acceptance;Explanation;Verbal Demonstration

## 2024-07-01 NOTE — THERAPY
Physical Therapy   Initial Evaluation     Patient Name: Nancy King  Age:  88 y.o., Sex:  female  Medical Record #: [id number]  Today's Date: 7/1/2024     Precautions  Precautions: Fall Risk    Assessment  Patient is 88 y.o. female with HTN, HFpEF, Afib on apixaban, T2DM, CAD s/p NSTEMI who presented 6/29/2024 with hypoglycemia after being ALOC. 2 weeks of depression, no appetite, dizzy, CP. Pt with decreased initiation, flat affect. Required 2 person assist for mobility, able to sit at EOB without assist, min left lateral lean. Attempted sit to stand with FWW, only able to do partial stand. Pt c/o fatigue. Will continue to follow.     Plan    Physical Therapy Initial Treatment Plan   Treatment Plan : Bed Mobility, Gait Training, Neuro Re-Education / Balance, Therapeutic Activities, Therapeutic Exercise  Treatment Frequency: 4 Times per Week  Duration: Until Therapy Goals Met    DC Equipment Recommendations: Unable to determine at this time  Discharge Recommendations: Recommend post-acute placement for additional physical therapy services prior to discharge home       Subjective    Pt agreed to PT/OT.      Objective       07/01/24 0858   Initial Contact Note    Initial Contact Note Order Received and Verified, Physical Therapy Evaluation in Progress with Full Report to Follow.   Precautions   Precautions Fall Risk   Vitals   O2 (LPM) 2   O2 Delivery Device Silicone Nasal Cannula   Pain 0 - 10 Group   Therapist Pain Assessment Post Activity Pain Same as Prior to Activity  (pt stated she did not have any pain but grimaced when scooting up in bed)   Prior Living Situation   Prior Services Home-Independent   Housing / Facility 2 Story House   Steps Into Home 3   Steps In Home   (fligth of stairs with chair lift)   Bathroom Set up Walk In Shower;Grab Bars;Shower Chair   Equipment Owned Front-Wheel Walker;Wheelchair;Tub / Shower Seat;Grab Bar(s) In Tub / Shower;Adjustable Bed Without Rails;Other (Comments)  (chair  lift)   Lives with - Patient's Self Care Capacity Spouse   Comments Pt currently resides with her  who is able to provide assist. Pt reports that her son and DIL live local and may be able to provide assist. Per previous note, pt's spouse utilizes a FWW and is limited in the level of assist he is able to provide.   Cognition    Cognition / Consciousness X   Speech/ Communication Delayed Responses   Level of Consciousness Alert   Sequencing Impaired   Initiation Impaired   Comments pleasant, cooperative, flat affect   Active ROM Lower Body    Active ROM Lower Body  WDL   Strength Lower Body   Lower Body Strength  X   Gross Strength Generalized Weakness, Equal Bilaterally   Vision   Vision Comments wears glasses all the time but did not want them on today   Balance Assessment   Sitting Balance (Static) Fair   Sitting Balance (Dynamic) Fair -   Standing Balance (Static) Poor -   Standing Balance (Dynamic) Trace +   Weight Shift Sitting Fair   Weight Shift Standing Poor   Comments partial stand with FWW, min tremulous with standing   Bed Mobility    Supine to Sit Maximal Assist   Sit to Supine Maximal Assist   Scooting Maximal Assist   Rolling Maximal Assist to Rt.;Maximum Assist to Lt.   Comments HOB slightly elevated, assistance with grabbing bed rail   Gait Analysis   Gait Level Of Assist Unable to Participate   Functional Mobility   Sit to Stand Maximal Assist   6 Clicks Assessment - How much HELP from from another person do you currently need... (If the patient hasn't done an activity recently, how much help from another person do you think he/she would need if he/she tried?)   Turning from your back to your side while in a flat bed without using bedrails? 2   Moving from lying on your back to sitting on the side of a flat bed without using bedrails? 2   Moving to and from a bed to a chair (including a wheelchair)? 2   Standing up from a chair using your arms (e.g., wheelchair, or bedside chair)? 2   Walking  in hospital room? 2   Climbing 3-5 steps with a railing? 1   6 clicks Mobility Score 11   Activity Tolerance   Sitting Edge of Bed 4 minutes   Standing less than a minute   Comments limited by fatigue and weakness   Short Term Goals    Short Term Goal # 1 Pt will perform supine to/from sit with flat bed and no features SBA in 6 visits to progress bed mobility   Short Term Goal # 2 Pt will perform sit to/from stand with FWW SBA in 6 visits to progress transfers   Short Term Goal # 3 Pt will amb with FWW 200ft SBA in 6 visits to progress with functional household mobility.   Education Group   Education Provided Role of Physical Therapist;Use of Assistive Device   Role of Physical Therapist Patient Response Patient;Acceptance;Explanation;Verbal Demonstration   Use of Assistive Device Patient Response Patient;Acceptance;Explanation;Verbal Demonstration;Action Demonstration   Physical Therapy Initial Treatment Plan    Treatment Plan  Bed Mobility;Gait Training;Neuro Re-Education / Balance;Therapeutic Activities;Therapeutic Exercise   Treatment Frequency 4 Times per Week   Duration Until Therapy Goals Met   Problem List    Problems Pain;Impaired Bed Mobility;Impaired Transfers;Impaired Ambulation;Decreased Activity Tolerance;Safety Awareness Deficits / Cognition;Impaired Balance;Functional Strength Deficit   Anticipated Discharge Equipment and Recommendations   DC Equipment Recommendations Unable to determine at this time   Discharge Recommendations Recommend post-acute placement for additional physical therapy services prior to discharge home   Interdisciplinary Plan of Care Collaboration   IDT Collaboration with  Nursing;Occupational Therapist  Patient seen for team evaluation with Occupational Therapist for the following reason(s):  Patient required 2 person assistance for safety and to provide effective interventions. Each discipline assisted patient with appropriate and separate goals.  Physical Therapist facilitated  with sitting balance, activity tolerance, assistance with while Occupational Therapist simultaneously treated pt according to POC.     Patient Position at End of Therapy In Bed;Bed Alarm On;Call Light within Reach;Tray Table within Reach;Phone within Reach   Collaboration Comments RN updated

## 2024-07-01 NOTE — PROGRESS NOTES
Monitor summary: SB/SR 42-51, [place] -0.20, QRS -0.04, QT -0.55, with rare PVCs and trigeminal PVCs per strip from the monitor room.

## 2024-07-01 NOTE — WOUND TEAM
[facility]  Inpatient Services  Initial Wound and Skin Care Evaluation    Admission Date: 6/29/2024     Last order of IP CONSULT TO WOUND CARE was found on 7/1/2024 from Hospital Encounter on 6/29/2024     HPI, PMH, SH: Reviewed    Past Surgical History:   Procedure Laterality Date    MASTECTOMY  1/12/2015    Performed by Dwight Birch M.D. at SURGERY [facility] ORS    NODE BIOPSY SENTINEL  1/12/2015    Performed by Dwight Birch M.D. at SURGERY [facility] ORS    AXILLARY NODE DISSECTION  1/12/2015    Performed by Dwight Birch M.D. at SURGERY [facility] ORS    APPENDECTOMY      CHOLECYSTECTOMY      HYSTERECTOMY, TOTAL ABDOMINAL      OTHER      Bladder    TONSILLECTOMY AND ADENOIDECTOMY       Social History     Tobacco Use    Smoking status: Never    Smokeless tobacco: Never   Substance Use Topics    Alcohol use: Yes     Comment: occ. rarely     Chief Complaint   Patient presents with    ALOC     Pt biba PHI Air from home, pt family called due to pt having altered level of consciousness, last known well two weeks ago, pt alert and oriented x2, on arrival pt had blood sugar 39, pt was given oral glucose tab 15mg, blood sugar check after 256, pt has no complains, GCS 14, pt is incontinent of urine, on transport pt was given 4mg zofran, blood sugar check again 151, 174/71, HR 59, history of DM, afib on eliquist, hyperlipidemia, hypothyroid     Diagnosis: Acute encephalopathy [G93.40]  Acute on chronic HFrEF (heart failure with reduced ejection fraction) ([facility]) [I50.23]    Unit where seen by Wound Team: S176/02     WOUND CONSULT RELATED TO:  sacrum, heels and L ear    WOUND TEAM PLAN OF CARE - Frequency of Follow-up:   Nursing to follow dressing orders written for wound care. Contact wound team if area fails to progress, deteriorates or with any questions/concerns if something comes up before next scheduled follow up (See below as to whether wound is following and frequency of wound follow up)   Not  "following, consult as needed  - sacrum, L ear, heels    WOUND HISTORY:   Per pt she has had small \"bump on my tailbone.\" She had altered LOC and was last well a couple of weeks ago.        WOUND ASSESSMENT/LDA  Wound 06/29/24 Pressure Injury Coccyx Discolored, purple area, slow to [place]. Bony sacrum (Active)   Date First Assessed/Time First Assessed: 06/29/24 2315   Present on Original Admission: Yes  Primary Wound Type: Pressure Injury  Location: Coccyx  Wound Description (Comments): Discolored, purple area, slow to [place]. Bony sacrum POA DTI       Assessments 7/1/2024 10:00 AM   Wound Image      Site Assessment Red;Light Purple;Pink;Painful   Periwound Assessment Pink;Red;Blanchable erythema   Margins Defined edges   Closure Open to air   Drainage Amount None   Wound Cleansing Foam Cleanser/Washcloth   Periwound Protectant Barrier Paste   Dressing Status Open to Air   Dressing Cleansing/Solutions Not Applicable   Dressing Options Open to Air   NEXT Weekly Photo (Inpatient Only) 07/08/24   Wound Team Following Not following   Pressure Injury Stage DTI   Wound Length (cm) 3 cm   Wound Width (cm) 1.2 cm   Wound Surface Area (cm^2) 3.6 cm^2   Shape oval        Vascular:    TOM:   No results found.    Lab Values:    Lab Results   Component Value Date/Time    WBC 8.1 06/29/2024 05:20 PM    RBC 4.60 06/29/2024 05:20 PM    HEMOGLOBIN 13.5 06/29/2024 05:20 PM    HEMATOCRIT 41.7 06/29/2024 05:20 PM    HBA1C 6.2 (H) 04/18/2024 03:43 AM    HBA1C 6.8 (H) 07/12/2023 12:14 PM         Culture Results show:  No results found for this or any previous visit (from the past 720 hour(s)).    Pain Level/Medicated:  None, Tolerated without pain medication       INTERVENTIONS BY WOUND TEAM:  Chart and images reviewed. Discussed with bedside RN. All areas of concern (based on picture review, LDA review and discussion with bedside RN) have been thoroughly assessed. Documentation of areas based on significant findings. This RN in to assess " patient. Performed standard wound care which includes appropriate positioning, dressing removal and non-selective debridement. Pictures and measurements obtained weekly if/when required.    Wound:  sacrococcygeal area  Preparation for Dressing removal: Open to air  Cleansed/Non-selectively Debrided with:  No rinse foam soap and Moist warm washcloth  Elizabeth wound: Cleansed with No rinse foam soap and Moist warm washcloth, Prepped with Barrier paste  Primary Dressing:  HANY    Advanced Wound Care Discharge Planning  Number of Clinicians necessary to complete wound care: 1  Is patient requiring IV pain medications for dressing changes:  No   Length of time for dressing change 30 min. (This does not include chart review, pre-medication time, set up, clean up or time spent charting.)    Interdisciplinary consultation: Patient, Bedside RN (Hilary)    EVALUATION / RATIONALE FOR TREATMENT:     Date:  07/01/24  Wound Status:  Initial evaluation    Pt has painful POA DTI light purple/red/pink tissue to coccyx. She states the raised skin has been there her whole life. She is incontinent of urine, Purwick in place. Discussion with RN who is trying to get pt a pump for SYMONE bed feature of mattress.          Goals: Steady decrease in wound area and depth weekly.    NURSING PLAN OF CARE ORDERS:  RN Prevention Protocol    NUTRITION RECOMMENDATIONS   Wound Team Recommendations:  N/A    DIET ORDERS (From admission to next 24h)       Start     Ordered    06/29/24 2055  Diet Order Diet: Regular  ALL MEALS        Question:  Diet:  Answer:  Regular    06/29/24 2055                    PREVENTATIVE INTERVENTIONS:    Q shift Nicolas - performed per nursing policy  Q shift pressure point assessments - performed per nursing policy    Surface/Positioning  Standard/trauma mattress - Currently in Place  Reposition q 2 hours - Currently in Place  TAPs Turning system - Currently in Place    Offloading/Redistribution  Heel offloading dressing (Silicone  dressing) - Ordered  Float Heels off Bed with Pillows - Currently in Place           Respiratory  Silicone O2 tubing - Currently in Place  Gray Foam Ear protectors - Currently in Place    Containment/Moisture Prevention    Dri-mar pad - Currently in Place  Purwick/Condom Cath - Currently in Place  Barrier wipes - Ordered  Barrier paste - Currently in Place    Anticipated discharge plans:  TBD        Vac Discharge Needs:  Vac Discharge plan is purely a recommendation from wound team and not a requirement for discharge unless otherwise stated by physician.  Not Applicable Pt not on a wound vac

## 2024-07-01 NOTE — DISCHARGE PLANNING
Acute on chronic heart failure. Ongoing medical management anticipated therapy need based on 6 clicks. Per chart review had been using a wheelchair at home. Please consider PT/OT evaluations if appropriate and a PM&R referral to assist with discharge planning. Medicare provider and spouse support per chart review.

## 2024-07-01 NOTE — CARE PLAN
The patient is Stable - Low risk of patient condition declining or worsening    Shift Goals  Clinical Goals: Maintain skin integrity, wean off O2.  Patient Goals: Sleep  Family Goals: keon    Progress made toward(s) clinical / shift goals:  q2hour turns in place. Patient remains on 0.5 l of O2.     Patient is not progressing towards the following goals:

## 2024-07-01 NOTE — CARE PLAN
The patient is Stable - Low risk of patient condition declining or worsening    Shift Goals  Clinical Goals: Maintain skin integrity  Patient Goals: Sleep  Family Goals: keon    Progress made toward(s) clinical / shift goals:      Problem: Skin Integrity  Goal: Skin integrity is maintained or improved  Outcome: Progressing   Pt turned and repositioned Q2H or as requested. Barrier cream and mepilexes used to prevent skin breakdown on delicate perineal areas and bony prominences. Linen changes provided as needed to avoid risk of developing pressure ulcers.     Patient is not progressing towards the following goals:

## 2024-07-02 ENCOUNTER — APPOINTMENT (OUTPATIENT)
Dept: RADIOLOGY | Facility: MEDICAL CENTER | Age: 89
DRG: 291 | End: 2024-07-02
Attending: STUDENT IN AN ORGANIZED HEALTH CARE EDUCATION/TRAINING PROGRAM
Payer: MEDICARE

## 2024-07-02 PROBLEM — Z71.89 ADVANCE CARE PLANNING: Status: ACTIVE | Noted: 2024-07-02

## 2024-07-02 LAB
ANION GAP SERPL CALC-SCNC: 14 MMOL/L (ref 7–16)
BUN SERPL-MCNC: 33 MG/DL (ref 8–22)
CALCIUM SERPL-MCNC: 9.1 MG/DL (ref 8.5–10.5)
CHLORIDE SERPL-SCNC: 91 MMOL/L (ref 96–112)
CO2 SERPL-SCNC: 29 MMOL/L (ref 20–33)
CREAT SERPL-MCNC: 1.58 MG/DL (ref 0.5–1.4)
GFR SERPLBLD CREATININE-BSD FMLA CKD-EPI: 32 ML/MIN/1.73 M 2
GLUCOSE SERPL-MCNC: 193 MG/DL (ref 65–99)
MAGNESIUM SERPL-MCNC: 1.8 MG/DL (ref 1.5–2.5)
POTASSIUM SERPL-SCNC: 2.9 MMOL/L (ref 3.6–5.5)
SODIUM SERPL-SCNC: 134 MMOL/L (ref 135–145)

## 2024-07-02 PROCEDURE — 770020 HCHG ROOM/CARE - TELE (206)

## 2024-07-02 PROCEDURE — 99497 ADVNCD CARE PLAN 30 MIN: CPT | Performed by: INTERNAL MEDICINE

## 2024-07-02 PROCEDURE — 80048 BASIC METABOLIC PNL TOTAL CA: CPT

## 2024-07-02 PROCEDURE — 99232 SBSQ HOSP IP/OBS MODERATE 35: CPT | Mod: 25 | Performed by: INTERNAL MEDICINE

## 2024-07-02 PROCEDURE — A9270 NON-COVERED ITEM OR SERVICE: HCPCS | Performed by: STUDENT IN AN ORGANIZED HEALTH CARE EDUCATION/TRAINING PROGRAM

## 2024-07-02 PROCEDURE — 36415 COLL VENOUS BLD VENIPUNCTURE: CPT

## 2024-07-02 PROCEDURE — 71045 X-RAY EXAM CHEST 1 VIEW: CPT

## 2024-07-02 PROCEDURE — 83735 ASSAY OF MAGNESIUM: CPT

## 2024-07-02 PROCEDURE — 700102 HCHG RX REV CODE 250 W/ 637 OVERRIDE(OP): Performed by: STUDENT IN AN ORGANIZED HEALTH CARE EDUCATION/TRAINING PROGRAM

## 2024-07-02 PROCEDURE — 700111 HCHG RX REV CODE 636 W/ 250 OVERRIDE (IP): Mod: JZ | Performed by: STUDENT IN AN ORGANIZED HEALTH CARE EDUCATION/TRAINING PROGRAM

## 2024-07-02 RX ORDER — FUROSEMIDE 40 MG/1
40 TABLET ORAL
Status: DISCONTINUED | OUTPATIENT
Start: 2024-07-03 | End: 2024-07-05 | Stop reason: HOSPADM

## 2024-07-02 RX ADMIN — SERTRALINE HYDROCHLORIDE 50 MG: 50 TABLET ORAL at 06:31

## 2024-07-02 RX ADMIN — APIXABAN 5 MG: 5 TABLET, FILM COATED ORAL at 06:30

## 2024-07-02 RX ADMIN — LEVOTHYROXINE SODIUM 50 MCG: 0.05 TABLET ORAL at 06:31

## 2024-07-02 RX ADMIN — APIXABAN 5 MG: 5 TABLET, FILM COATED ORAL at 17:25

## 2024-07-02 RX ADMIN — RANOLAZINE 500 MG: 500 TABLET, EXTENDED RELEASE ORAL at 17:25

## 2024-07-02 RX ADMIN — ROSUVASTATIN CALCIUM 20 MG: 20 TABLET, FILM COATED ORAL at 17:24

## 2024-07-02 RX ADMIN — AMIODARONE HYDROCHLORIDE 200 MG: 200 TABLET ORAL at 06:31

## 2024-07-02 RX ADMIN — FUROSEMIDE 40 MG: 10 INJECTION, SOLUTION INTRAVENOUS at 06:30

## 2024-07-02 RX ADMIN — LISINOPRIL 20 MG: 20 TABLET ORAL at 06:32

## 2024-07-02 RX ADMIN — Medication 10 MG: at 20:16

## 2024-07-02 RX ADMIN — LEVOTHYROXINE SODIUM 50 MCG: 0.05 TABLET ORAL at 17:25

## 2024-07-02 ASSESSMENT — ENCOUNTER SYMPTOMS
BACK PAIN: 0
FEVER: 0
ABDOMINAL PAIN: 0
SHORTNESS OF BREATH: 0
CHILLS: 0
HEADACHES: 0

## 2024-07-02 ASSESSMENT — PAIN DESCRIPTION - PAIN TYPE
TYPE: ACUTE PAIN

## 2024-07-02 ASSESSMENT — FIBROSIS 4 INDEX: FIB4 SCORE: 2.19

## 2024-07-02 NOTE — PROGRESS NOTES
Hospital Medicine Daily Progress Note    Date of Service  7/2/2024    Chief Complaint  Nancy King is a 88 y.o. female with HTN, HFpEF, Afib on apixaban, T2DM, CAD s/p NSTEMI admitted 6/29/2024 with hypogycemia and decompensated HFpEF.    Hospital Course  No notes on file    Interval Problem Update  Patient was seen and examined at bedside.  No acute events overnight. Patient is resting comfortably in bed and in no acute distress.     Goals of care conversation with   Plan to discharge home on hospice  Hospice referral placed    I have discussed this patient's plan of care and discharge plan at IDT rounds today with Case Management, Nursing, Nursing leadership, and other members of the IDT team.    Consultants/Specialty  psychology    Code Status  DNAR/DNI    Disposition  The patient is medically cleared for discharge to home or a post-acute facility.  Anticipate discharge to: hospice    I have placed the appropriate orders for post-discharge needs.    Review of Systems  Review of Systems   Unable to perform ROS: Dementia   Constitutional:  Negative for chills and fever.   Respiratory:  Negative for shortness of breath.    Cardiovascular:  Negative for chest pain.   Gastrointestinal:  Negative for abdominal pain.   Musculoskeletal:  Negative for back pain.   Neurological:  Negative for headaches.        Physical Exam  Temp:  [36.4 °C (97.5 °F)-36.9 °C (98.4 °F)] 36.7 °C (98.1 °F)  Pulse:  [48-71] 55  Resp:  [14-16] 14  BP: ()/(40-74) 124/44  SpO2:  [90 %-95 %] 95 %    Physical Exam  Vitals and nursing note reviewed. Exam conducted with a chaperone present ( at bedside).   Constitutional:       Appearance: She is not ill-appearing.      Interventions: Nasal cannula in place.   HENT:      Head:      Comments: Bitemporal wasting     Nose: Nose normal.   Eyes:      General: No scleral icterus.     Conjunctiva/sclera: Conjunctivae normal.   Cardiovascular:      Rate and Rhythm: Normal rate and  regular rhythm.      Pulses: Normal pulses.      Heart sounds: Murmur (2/6 systolic, does not radiate to carotids) heard.   Pulmonary:      Effort: Pulmonary effort is normal.      Breath sounds: Normal breath sounds. No wheezing or rales.   Abdominal:      General: Abdomen is flat. Bowel sounds are normal.      Palpations: Abdomen is soft.      Tenderness: There is no abdominal tenderness. There is no guarding or rebound.   Genitourinary:     Comments: No lan  Musculoskeletal:         General: No swelling or deformity.      Cervical back: Neck supple.   Skin:     General: Skin is warm and dry.      Findings: No bruising.   Neurological:      Mental Status: She is alert.      Comments: Appropriately conversant, moves all extremities   Psychiatric:         Attention and Perception: Attention and perception normal.         Mood and Affect: Mood is depressed. Affect is flat.         Speech: Speech normal.         Behavior: Behavior is slowed. Behavior is cooperative.         Cognition and Memory: Cognition and memory normal.         Fluids    Intake/Output Summary (Last 24 hours) at 7/2/2024 1515  Last data filed at 7/1/2024 2000  Gross per 24 hour   Intake 150 ml   Output --   Net 150 ml         Laboratory  Recent Labs     06/29/24  1720   WBC 8.1   RBC 4.60   HEMOGLOBIN 13.5   HEMATOCRIT 41.7   MCV 90.7   MCH 29.3   MCHC 32.4   RDW 46.9   PLATELETCT 211   MPV 9.7     Recent Labs     06/30/24  0426 07/01/24  0220 07/02/24  0125   SODIUM 135 135 134*   POTASSIUM 4.0 3.6 2.9*   CHLORIDE 93* 91* 91*   CO2 28 30 29   GLUCOSE 131* 124* 193*   BUN 17 24* 33*   CREATININE 1.04 1.12 1.58*   CALCIUM 9.5 9.1 9.1                   Imaging  DX-CHEST-PORTABLE (1 VIEW)   Final Result      Improvement in small right pleural effusion with trace residual.      EC-ECHOCARDIOGRAM COMPLETE W/ CONT   Final Result      CT-RENAL COLIC EVALUATION(A/P W/O)   Final Result      1.  There is no evidence of nephrolithiasis, urolithiasis, or  hydronephrosis.   2.  Probable 4 mm phlebolith in the right hemipelvis as noted above.   3.  Colonic diverticulosis with no evidence of diverticulitis.   4.  Bilateral lower lobe airspace disease is suspicious for multifocal pneumonia with associated bilateral pleural effusions as described above.   5.  Chronic bony changes as described above.      CT-HEAD W/O   Final Result      1.  There is no acute intracranial hemorrhage or infarct.      2.  White matter lucencies most consistent with small vessel ischemic change versus demyelination or gliosis.      3.  There is cerebral atrophy.               DX-CHEST-PORTABLE (1 VIEW)   Final Result      1.  Enlarged cardiac silhouette with changes of pulmonary edema.   2.  Small right pleural effusion.           Assessment/Plan  * Acute on chronic heart failure with preserved ejection fraction (HCC)- (present on admission)  Assessment & Plan  Seemingly decompensated by pleural effusion, pulmonary edema with AHRF, peripheral edema, elevated BNP  CXR demonstrates pulmonary vascular congestion / pulmonary edema  Continue metoprolol  Lasix IV 40 mg BID  TTE with intact LVEF not indicative of ischemic cardiomyopathy    Acute respiratory failure with hypoxia (HCC)- (present on admission)  Assessment & Plan  On room air    Acute encephalopathy- (present on admission)  Assessment & Plan  Resolved  Likely due to hypoglycemia from depression and inadequate PO intake  Bcx drawn in ED 6/29 - NGTD  UA not indicative of infection    Pleural effusion on right- (present on admission)  Assessment & Plan  Not a candidate for thoracentesis due to apixaban for Afib  Likely due to decompensated HFpEF  No signs of SIRS to suggest parapneumonic effusion nor empyema  Monitor with repeat CXR after IV diuresis    Pulmonary infiltrates- (present on admission)  Assessment & Plan  Noted on CT chest  Likely pulmonary edema  Procalcitonin negative - no expected benefit of CAP antibiotics    Moderate  protein-calorie malnutrition (HCC)- (present on admission)  Assessment & Plan  Unclear weight loss amount, bitemporal wasting with anorexia  RD consult    Paroxysmal atrial fibrillation (HCC)- (present on admission)  Assessment & Plan  Continue amiodarone, metoprolol, apixaban    Type 2 diabetes mellitus with hypoglycemia without coma, without long-term current use of insulin (HCC)- (present on admission)  Assessment & Plan  A1c 6.2  Hypoglycemic likely due to depression and anorexia  Monitor POCT q6h, SSI deferred in absence of indication for strict BG control  Unrestricted diet due to anorexia  Continue rosuvastatin  Holding sitagliptin      Severe episode of recurrent major depressive disorder, without psychotic features (HCC)- (present on admission)  Assessment & Plan  Attributed to symptomatic coronary artery disease  Causing anorexia and anhedonia sufficient for hypoglycemia and dehydration  Psychology consult  Family support verified -  willing to care for her at home, family will respect her wishes for SNF or Hospice  Agreeable to initiation of sertraline (ELIJAH JACK)    Advance care planning  Assessment & Plan  Goals of care discussion with patient and . Patient is A&O x1-2 and unable to make her own decisions. Discussed patients underlying dementia in addition to her chronic medical conditions. Patients  has decided for patient to discharge home on hospice. Will continue to work [place]. Total of 21 minutes spent in discussion and coordination of advance care planning.     Delirium  Assessment & Plan  CTH with atrophy and microvascular disease, probable mild underlying cognitive impairment unmasked in hospital  MAR reviewed - no centrally-acting medications  Melatonin QHS, trazodone HS PRN to promote sleep-wake cycle  Re-orient frequently, familiar persons at bedside when able  No metabolic abnormalities on  BMP    Acquired hypothyroidism- (present on admission)  Assessment & Plan  Continue levothyroxine    Primary hypertension- (present on admission)  Assessment & Plan  Continue cardiac meds per CAD plan    Coronary artery disease of native artery of native heart with stable angina pectoris (HCC)- (present on admission)  Assessment & Plan  Stable  Due to multivessel CAD which was not amenable to PCI, not a candidate for CABG  EKG and troponin not indicative of acute ischemia  Continue lisinopril, apixaban, ranolazine, imdur, rosuvastatin, metoprolol  SLN, EKG, troponin PRN chest pain      VTE prophylaxis:    therapeutic anticoagulation with eliquis 5 mg BID    I have performed a physical exam and reviewed and updated ROS and Plan today (7/2/2024). In review of yesterday's note (7/1/2024), there are no changes except as documented above.    Greater than 45 minutes spent prepping to see patient (e.g. review of tests) obtaining and/or reviewing separately obtained history. Performing a medically appropriate examination and/ evaluation.  Counseling and educating the patient/family/caregiver.  Ordering medications, tests, or procedures.  Referring and communicating with other health care professionals.  Documenting clinical information in EPIC.  Independently interpreting results and communicating results to patient/family/caregiver.  Care coordination.

## 2024-07-02 NOTE — CARE PLAN
The patient is Stable - Low risk of patient condition declining or worsening    Shift Goals  Clinical Goals: Safety; skin integrity  Patient Goals: discharge  Family Goals: pt comfort    Progress made toward(s) clinical / shift goals:    Pt AO to self and sometimes place; no distress but does c/o back pain- unable to rate and declines pain medication; telesitter at bedside; bed alarm active; SYMONE in place; skin care performed; bed low and locked; call bell and belongings in reach    Problem: Fall Risk  Goal: Patient will remain free from falls  Description: Target End Date:  Prior to discharge or change in level of care    Document interventions on the Masonsharri Torres Fall Risk Assessment    1.  Assess for fall risk factors  2.  Implement fall precautions  Outcome: Progressing     Problem: Skin Integrity  Goal: Skin integrity is maintained or improved  Description: Target End Date:  Prior to discharge or change in level of care    Document interventions on Skin Risk/Nicolas flowsheet groups and corresponding LDA    1.  Assess and monitor skin integrity, appearance and/or temperature  2.  Assess risk factors for impaired skin integrity and/or pressures ulcers  3.  Implement precautions to protect skin integrity in collaboration with interdisciplinary team  4.  Implement pressure ulcer prevention protocol if at risk for skin breakdown  5.  Confirm wound care consult if at risk for skin breakdown  6.  Ensure patient use of pressure relieving devices  (Low air loss bed, waffle overlay, heel protectors, ROHO cushion, etc)  Outcome: Progressing

## 2024-07-02 NOTE — PROGRESS NOTES
Hospital Medicine Daily Progress Note    Date of Service  7/1/2024    Chief Complaint  Nancy King is a 88 y.o. female with HTN, HFpEF, Afib on apixaban, T2DM, CAD s/p NSTEMI admitted 6/29/2024 with hypogycemia and decompensated HFpEF.    Hospital Course  No notes on file    Interval Problem Update    OSITO ON  She states that she is having a hard day.  She feels guilty for the strain the caregiving and compromising puts on her .  She recognizes that her effort and participation with PT/OT was affected by her mood.  She is unsure what will make her happy or where to go from here.  Advised that we will continue maximal IV diuresis, but the day is approaching she will be medically clear and have to choose to go home with hospice versus going to SNF as recommended by PT/OT.  Agreeable to hospice consultation.  She is urinating frequently and was able to wean from oxygen today.  She denies bleeding, bowel dysfunction, dyspnea, chest pain.  He  indicates that she was delirious this afternoon, stating she needs to get dressed to go to work.  TTE demonstrates intact LVEF.    BMP normal.   Mg normal.    I have discussed this patient's plan of care and discharge plan at IDT rounds today with Case Management, Nursing, Nursing leadership, and other members of the IDT team.    Consultants/Specialty  psychology    Code Status  DNAR/DNI    Disposition  The patient is not medically cleared for discharge to home or a post-acute facility.  Anticipate discharge to: skilled nursing facility    I have placed the appropriate orders for post-discharge needs.    Review of Systems  Review of Systems   Constitutional:  Negative for chills and fever.   Respiratory:  Negative for shortness of breath.    Cardiovascular:  Negative for chest pain.   Gastrointestinal:  Negative for abdominal pain, nausea and vomiting.   Musculoskeletal:  Negative for back pain, myalgias and neck pain.   Neurological:  Negative for headaches.    Psychiatric/Behavioral:  Positive for depression.         Physical Exam  Temp:  [36.5 °C (97.7 °F)-36.7 °C (98.1 °F)] 36.7 °C (98.1 °F)  Pulse:  [48-59] 59  Resp:  [16-18] 16  BP: (116-172)/(48-63) 158/53  SpO2:  [90 %-98 %] 90 %    Physical Exam  Vitals and nursing note reviewed. Exam conducted with a chaperone present ( at bedside).   Constitutional:       General: She is not in acute distress.     Appearance: She is not ill-appearing, toxic-appearing or diaphoretic.      Interventions: Nasal cannula in place.   HENT:      Head:      Comments: Bitemporal wasting     Nose: Nose normal.   Eyes:      General: No scleral icterus.     Conjunctiva/sclera: Conjunctivae normal.   Cardiovascular:      Rate and Rhythm: Normal rate and regular rhythm.      Pulses: Normal pulses.      Heart sounds: Murmur (2/6 systolic, does not radiate to carotids) heard.      No friction rub. No gallop.   Pulmonary:      Effort: Pulmonary effort is normal. No respiratory distress.      Breath sounds: Normal breath sounds. No wheezing, rhonchi or rales.   Abdominal:      General: Abdomen is flat. Bowel sounds are normal. There is no distension.      Palpations: Abdomen is soft.      Tenderness: There is no abdominal tenderness. There is no guarding or rebound.   Genitourinary:     Comments: No lan  Musculoskeletal:      Cervical back: Neck supple.      Right lower leg: No edema.      Left lower leg: No edema.   Skin:     General: Skin is warm and dry.   Neurological:      Mental Status: She is alert.      Comments: Appropriately conversant, moves all extremities   Psychiatric:         Attention and Perception: Attention and perception normal.         Mood and Affect: Mood is depressed. Affect is flat.         Speech: Speech normal.         Behavior: Behavior is slowed. Behavior is cooperative.         Thought Content: Thought content normal.         Cognition and Memory: Cognition and memory normal.         Judgment: Judgment  normal.         Fluids  No intake or output data in the 24 hours ending 07/01/24 1721      Laboratory  Recent Labs     06/29/24  1720   WBC 8.1   RBC 4.60   HEMOGLOBIN 13.5   HEMATOCRIT 41.7   MCV 90.7   MCH 29.3   MCHC 32.4   RDW 46.9   PLATELETCT 211   MPV 9.7     Recent Labs     06/29/24  1720 06/30/24  0426 07/01/24  0220   SODIUM 135 135 135   POTASSIUM 4.6 4.0 3.6   CHLORIDE 96 93* 91*   CO2 27 28 30   GLUCOSE 157* 131* 124*   BUN 16 17 24*   CREATININE 1.01 1.04 1.12   CALCIUM 9.5 9.5 9.1                   Imaging  EC-ECHOCARDIOGRAM COMPLETE W/ CONT   Final Result      CT-RENAL COLIC EVALUATION(A/P W/O)   Final Result      1.  There is no evidence of nephrolithiasis, urolithiasis, or hydronephrosis.   2.  Probable 4 mm phlebolith in the right hemipelvis as noted above.   3.  Colonic diverticulosis with no evidence of diverticulitis.   4.  Bilateral lower lobe airspace disease is suspicious for multifocal pneumonia with associated bilateral pleural effusions as described above.   5.  Chronic bony changes as described above.      CT-HEAD W/O   Final Result      1.  There is no acute intracranial hemorrhage or infarct.      2.  White matter lucencies most consistent with small vessel ischemic change versus demyelination or gliosis.      3.  There is cerebral atrophy.               DX-CHEST-PORTABLE (1 VIEW)   Final Result      1.  Enlarged cardiac silhouette with changes of pulmonary edema.   2.  Small right pleural effusion.           Assessment/Plan  * Acute on chronic heart failure with preserved ejection fraction (HCC)- (present on admission)  Assessment & Plan  Seemingly decompensated by pleural effusion, pulmonary edema with AHRF, peripheral edema, elevated BNP  CXR demonstrates pulmonary vascular congestion / pulmonary edema  Continue metoprolol  Lasix IV 40 mg BID  TTE with intact LVEF not indicative of ischemic cardiomyopathy    Severe episode of recurrent major depressive disorder, without psychotic  features (HCC)- (present on admission)  Assessment & Plan  Attributed to symptomatic coronary artery disease  Causing anorexia and anhedonia sufficient for hypoglycemia and dehydration  Psychology consult  Family support verified -  willing to care for her at home, family will respect her wishes for SNF or Hospice  Agreeable to initiation of sertraline (ELIJAH JACK)    Delirium  Assessment & Plan  CTH with atrophy and microvascular disease, probable mild underlying cognitive impairment unmasked in hospital  MAR reviewed - no centrally-acting medications  Melatonin QHS, trazodone HS PRN to promote sleep-wake cycle  Re-orient frequently, familiar persons at bedside when able  No metabolic abnormalities on BMP    Acute respiratory failure with hypoxia (HCC)- (present on admission)  Assessment & Plan  Due to acute cardiogenic pulmonary edema from decompensated HFpEF  IV diuresis  Wean as able, IS    Pleural effusion on right- (present on admission)  Assessment & Plan  Not a candidate for thoracentesis due to apixaban for Afib  Likely due to decompensated HFpEF  No signs of SIRS to suggest parapneumonic effusion nor empyema  Monitor with repeat CXR after IV diuresis    Pulmonary infiltrates- (present on admission)  Assessment & Plan  Noted on CT chest  Likely pulmonary edema  Procalcitonin negative - no expected benefit of CAP antibiotics    Acquired hypothyroidism- (present on admission)  Assessment & Plan  Continue levothyroxine    Moderate protein-calorie malnutrition (HCC)- (present on admission)  Assessment & Plan  Unclear weight loss amount, bitemporal wasting with anorexia  RD consult    Acute encephalopathy- (present on admission)  Assessment & Plan  Resolved  Likely due to hypoglycemia from depression and inadequate PO intake  Bcx drawn in ED 6/29 - NGTD  UA not indicative of infection    Paroxysmal atrial fibrillation (HCC)- (present on admission)  Assessment & Plan  Continue amiodarone, metoprolol,  apixaban    Type 2 diabetes mellitus with hypoglycemia without coma, without long-term current use of insulin (HCC)- (present on admission)  Assessment & Plan  A1c 6.2  Hypoglycemic likely due to depression and anorexia  Monitor POCT q6h, SSI deferred in absence of indication for strict BG control  Unrestricted diet due to anorexia  Continue rosuvastatin  Holding sitagliptin      Primary hypertension- (present on admission)  Assessment & Plan  Continue cardiac meds per CAD plan    Coronary artery disease of native artery of native heart with stable angina pectoris (HCC)- (present on admission)  Assessment & Plan  Stable  Due to multivessel CAD which was not amenable to PCI, not a candidate for CABG  EKG and troponin not indicative of acute ischemia  Continue lisinopril, apixaban, ranolazine, imdur, rosuvastatin, metoprolol  SLN, EKG, troponin PRN chest pain      VTE prophylaxis:    therapeutic anticoagulation with eliquis 5 mg BID    I have performed a physical exam and reviewed and updated ROS and Plan today (7/1/2024). In review of yesterday's note (6/30/2024), there are no changes except as documented above.

## 2024-07-02 NOTE — DISCHARGE PLANNING
Care Transition Team Assessment    Pt is an 88 y.o. female admitted for Acute encephalopathy Acute on chronic HFrEF.  Please see pt's H&P for prior medical history. LMSW met with pt at bedside to complete assessment. Pt A&Ox4 and able to verify the information on the face sheet, pt's  Augustin was present at bedside and provided assessment information as well.  Pt [place] a community [place] in [place], [place].  Pt reports that although home address is in [place], they reside 45 minutes outside of [facility].  Pt has ACP docs.  Prior to hospitalization pt relied totally on spouse for assistance with ADLs/IADLs.  Pt has home oxygen via [employer], wheelchairs, an electric stair lift, walkers, and have recently purchased a lift with wheels.  Pt's spouse, son Laith who lives one mile away, and daughter/emergency contact Chrissie, who lives in [facility], are good sources of support. Pt receives SSI, has [employer] acounts and family trust.  Pt's PCP is Dee Slater, telehealth appointment in near future for RX and follow up, sees pt regularly for check ups. Pt's preferred pharmacy is [email or web address] in [place]. Pt denies HH use in past.  Pt reports has used SNF services via [facility]. Pt confirmed medical coverage via Medicare and [employer].      LMSW discussed pt's discharge plan with pt and spouse.  They are agreeable to hospice care at home.  LMSW provided printed information on [facility] with address and phone number for pt's spouse to inquire about services provided and eligibility.  Per pt's spouse [facility] is unable to provide pt hospice service to pt. Cost for out of pocket transportation and need for DME hospital bed was discussed.    TC on Augustin's phone with son Laith.  Per Laith he is looking into other options for hospice care near pt's home.  Pt and family are interested in [facility]  residential facility where pt and spouse can live and pt can receive hospice care.  [facility] provided [place]ith with [facility] telephone number for [facility] representative, Sandro, to call.  [facility] received voicemail from Laith that local area is experiencing downed phone lines and to expect TC from Sandro in near future.  SW will continue to follow pt for case management needs.    Information Source  Orientation Level: Oriented X4  Information Given By: Patient, Spouse  Who is responsible for making decisions for patient? : Patient    Readmission Evaluation  Is this a readmission?: No    Elopement Risk  Legal Hold: No  Ambulatory or Self Mobile in Wheelchair: No-Not an Elopement Risk  Disoriented: Time-At Risk for Elopement, Situation-At Risk for Elopement  Psychiatric Symptoms: None  History of Wandering: No  Elopement this Admit: No  Vocalizing Wanting to Leave: No  Displays Behaviors, Body Language Wanting to Leave: No-Not at Risk for Elopement  Elopement Risk: Not at Risk for Elopement    Interdisciplinary Discharge Planning  Lives with - Patient's Self Care Capacity: Spouse  Patient or legal guardian wants to designate a caregiver: No  Support Systems: Spouse / Significant Other, Children  Housing / Facility: [address] [place] [address]  Prior Services: Home-Independent    Finances  Financial Barriers to Discharge: No  Prescription Coverage: Yes    Vision / Hearing Impairment  Vision Impairment : No  Right Eye Vision: Impaired, Wears Glasses  Left Eye Vision: Impaired, Wears Glasses  Hearing Impairment : No     Advance Directive  Advance Directive?: DPOA for Health Care  Durable Power of  Name and Contact : Augustin King [phone]    Domestic Abuse  Have you ever been the victim of abuse or violence?: No  Possible Abuse/Neglect Reported to:: Not Applicable       Anticipated Discharge Information  Discharge Disposition: D/T to hospice medical facility (51)

## 2024-07-02 NOTE — CARE PLAN
The patient is Stable - Low risk of patient condition declining or worsening    Shift Goals  Clinical Goals: Safety; skin integrity  Patient Goals: discharge  Family Goals: pt comfort    Progress made toward(s) clinical / shift goals:    Problem: Neuro Status  Goal: Neuro status will remain stable or improve  Outcome: Progressing  Note: Patient A&O x2-4 throughout the shift, patient has remained alert, corneal and cough present, numbness in lower extremities and generalized weakness. Patient and family have been educated on the use of the call light and to use that if they need assistance in any way.     Problem: Psychosocial  Goal: Patient's level of anxiety will decrease  Outcome: Progressing  Note: Patient and  were able to verbalize feelings about possibly not being able to get home hospice care in their area and being open to a live in hospice facility.

## 2024-07-02 NOTE — ASSESSMENT & PLAN NOTE
Goals of care discussion with patient and . Patient is A&O x1-2 and unable to make her own decisions. Discussed patients underlying dementia in addition to her chronic medical conditions. Patients  has decided for patient to discharge home on hospice. Will continue to work [place]. Total of 21 minutes spent in discussion and coordination of advance care planning.

## 2024-07-02 NOTE — ASSESSMENT & PLAN NOTE
CTH with atrophy and microvascular disease, probable mild underlying cognitive impairment unmasked in hospital  MAR reviewed - no centrally-acting medications  Melatonin QHS, trazodone HS PRN to promote sleep-wake cycle  Re-orient frequently, familiar persons at bedside when able  No metabolic abnormalities on BMP

## 2024-07-03 LAB
ALBUMIN SERPL BCP-MCNC: 3.6 G/DL (ref 3.2–4.9)
BUN SERPL-MCNC: 37 MG/DL (ref 8–22)
CALCIUM ALBUM COR SERPL-MCNC: 9.3 MG/DL (ref 8.5–10.5)
CALCIUM SERPL-MCNC: 9 MG/DL (ref 8.5–10.5)
CHLORIDE SERPL-SCNC: 93 MMOL/L (ref 96–112)
CO2 SERPL-SCNC: 29 MMOL/L (ref 20–33)
CREAT SERPL-MCNC: 1.64 MG/DL (ref 0.5–1.4)
GFR SERPLBLD CREATININE-BSD FMLA CKD-EPI: 30 ML/MIN/1.73 M 2
GLUCOSE SERPL-MCNC: 164 MG/DL (ref 65–99)
MAGNESIUM SERPL-MCNC: 1.8 MG/DL (ref 1.5–2.5)
PHOSPHATE SERPL-MCNC: 3.1 MG/DL (ref 2.5–4.5)
POTASSIUM SERPL-SCNC: 3 MMOL/L (ref 3.6–5.5)
SODIUM SERPL-SCNC: 135 MMOL/L (ref 135–145)

## 2024-07-03 PROCEDURE — 700102 HCHG RX REV CODE 250 W/ 637 OVERRIDE(OP): Performed by: STUDENT IN AN ORGANIZED HEALTH CARE EDUCATION/TRAINING PROGRAM

## 2024-07-03 PROCEDURE — 770020 HCHG ROOM/CARE - TELE (206)

## 2024-07-03 PROCEDURE — 80069 RENAL FUNCTION PANEL: CPT

## 2024-07-03 PROCEDURE — A9270 NON-COVERED ITEM OR SERVICE: HCPCS | Performed by: STUDENT IN AN ORGANIZED HEALTH CARE EDUCATION/TRAINING PROGRAM

## 2024-07-03 PROCEDURE — 99232 SBSQ HOSP IP/OBS MODERATE 35: CPT | Performed by: INTERNAL MEDICINE

## 2024-07-03 PROCEDURE — 83735 ASSAY OF MAGNESIUM: CPT

## 2024-07-03 PROCEDURE — 36415 COLL VENOUS BLD VENIPUNCTURE: CPT

## 2024-07-03 RX ADMIN — AMIODARONE HYDROCHLORIDE 200 MG: 200 TABLET ORAL at 06:52

## 2024-07-03 RX ADMIN — APIXABAN 5 MG: 5 TABLET, FILM COATED ORAL at 06:52

## 2024-07-03 RX ADMIN — Medication 10 MG: at 20:24

## 2024-07-03 RX ADMIN — ROSUVASTATIN CALCIUM 20 MG: 20 TABLET, FILM COATED ORAL at 17:49

## 2024-07-03 RX ADMIN — LEVOTHYROXINE SODIUM 50 MCG: 0.05 TABLET ORAL at 06:52

## 2024-07-03 RX ADMIN — APIXABAN 5 MG: 5 TABLET, FILM COATED ORAL at 17:49

## 2024-07-03 RX ADMIN — METOPROLOL SUCCINATE 50 MG: 25 TABLET, EXTENDED RELEASE ORAL at 06:52

## 2024-07-03 RX ADMIN — LEVOTHYROXINE SODIUM 50 MCG: 0.05 TABLET ORAL at 17:49

## 2024-07-03 RX ADMIN — SERTRALINE HYDROCHLORIDE 50 MG: 50 TABLET ORAL at 06:52

## 2024-07-03 RX ADMIN — RANOLAZINE 500 MG: 500 TABLET, EXTENDED RELEASE ORAL at 17:49

## 2024-07-03 RX ADMIN — RANOLAZINE 500 MG: 500 TABLET, EXTENDED RELEASE ORAL at 06:52

## 2024-07-03 RX ADMIN — ISOSORBIDE MONONITRATE 60 MG: 60 TABLET, EXTENDED RELEASE ORAL at 06:52

## 2024-07-03 ASSESSMENT — ENCOUNTER SYMPTOMS
SHORTNESS OF BREATH: 0
FEVER: 0
BACK PAIN: 0
CHILLS: 0
HEADACHES: 0
ABDOMINAL PAIN: 0

## 2024-07-03 ASSESSMENT — PAIN DESCRIPTION - PAIN TYPE
TYPE: ACUTE PAIN

## 2024-07-03 ASSESSMENT — FIBROSIS 4 INDEX: FIB4 SCORE: 2.19

## 2024-07-03 NOTE — DISCHARGE PLANNING
Case Management Discharge Planning    Admission Date: 6/29/2024  GMLOS: 3.9  ALOS: 4    6-Clicks ADL Score: 14  6-Clicks Mobility Score: 11  PT and/or OT Eval ordered: Yes  Post-acute Referrals Ordered: Yes  Post-acute Choice Obtained: No  Has referral(s) been sent to post-acute provider:  Yes      Anticipated Discharge Dispo: Discharge Disposition: D/T to hospice home (50)  Discharge Address: [address] [place] [id number]  Discharge Contact Phone Number: [phone]    DME Needed: Yes    DME Ordered: Yes    Hospital bed ordered.  TC to [facility], spoke to representative, Aurelia, who verified pt established with [facility] for home oxygen and able to deliver hospital bed to pt's address.  [facility] faxed order to [facility] [phone] with pt contact phone number and discharge date on fax cover sheet.     Action(s) Taken: Updated Provider/Nurse on Discharge Plan and Choice obtained    DME choice form signed by pt and faxed to [facility] for processing.     0900 and 1028 - TC to [facility] to verify they are unable to service pt, no answer, unable to leave message.      1118 - TC from pt's son Laith who reports family prefers that pt return home although unable to secure service from a hospice agency.  Laith reports they have a private RN within local area who can assist pt intermittently.  Reports family can provide transportation for patient on Friday 7/5 if physician clears pt for private transportation.  Discussed possibility of group home placement with hospice in [facility]. Family is not agreeable to group home placement.  Discussed pt's need for hospital bed.      1200 - TC to [facility] [phone], spoke to Alissa who reports unable to provide pt hospice service as service is not provided to pt's home area.    1218 - [facility] met with pt and spouse at bedside to discuss discharge plan.  Pt and spouse verified and agree with plan discussed with their son Laith.  Spouse reports son and son in law will  come to provide pt private transportation and bring pt's wheelchair from home.  Spouse reports pt has hospital bed at home with one rail, 2nd rail has been ordered online, requested that LMSW continue to order hospital bed via DME [employer].  Family's plan is to place side of bed without rail next to a wall for pt safety until DME is delivered.  LMSW discussed importance of contacting pt's PCP and specialist doctors for pt follow up after discharge.  Pt and spouse confirmed understanding.  LMSW asked pt if pt feels good about discharge plan, pt reported pt agrees with family and is comfortable with plans for discharge to home.    Escalations Completed: Provider, DME Company, and Leadership    Medically Clear: Yes    Next Steps: Follow pt for any additional case management needs.    Barriers to Discharge: DME    Is the patient up for discharge tomorrow: No

## 2024-07-03 NOTE — CARE PLAN
The patient is Stable - Low risk of patient condition declining or worsening    Shift Goals  Clinical Goals: Skin integrity; safety  Patient Goals: discharge; rest  Family Goals: utaa    Progress made toward(s) clinical / shift goals:    AOX2 self and place; no distress or c/o pain tonight; pt pleasant and cooperative; wound care and Q2 turns performed; pt resting in bed and not trying to get up w/o assistance; bed low and locked; call bell and belongings in reach    Problem: Fall Risk  Goal: Patient will remain free from falls  Description: Target End Date:  Prior to discharge or change in level of care    Document interventions on the [facility] Fall Risk Assessment    1.  Assess for fall risk factors  2.  Implement fall precautions  Outcome: Progressing     Problem: Neuro Status  Goal: Neuro status will remain stable or improve  Description: Target End Date:  Prior to discharge or change in level of care    Document on Neuro assessment in the Assessment flowsheet    1.  Assess and monitor neurologic status per provider order/protocol/unit policy  2.  Assess level of consciousness and orientation  3.  Assess for speech, dysarthria, dysphagia, facial symmetry  4.  Assess visual field, eye movements, gaze preference, pupil reaction and size  5.  Assess muscle strength and motor response in all four extremities  6.  Assess for sensation (numbness and tingling)  7.  Assess basic neuro reflexes (cough, gag, corneal)  8.  Identify changes in neuro status and report to provider for testing/treatment orders  Outcome: Progressing

## 2024-07-03 NOTE — CARE PLAN
The patient is Stable - Low risk of patient condition declining or worsening    Shift Goals  Clinical Goals: shower, get up to chair  Patient Goals: find hospice facility  Family Goals: utaa    Progress made toward(s) clinical / shift goals:    Problem: Nutrition  Goal: Patient's nutritional and fluid intake will be adequate or improve  Outcome: Not Progressing  Note: Patient is being encouraged by staff and her  to eat more of her meals and to drink her oral nutrition supplement. Patients  has brought in many of her favorite snacks today but her appetite has still been poor throughout this shift.     Problem: Self Care  Goal: Patient will have the ability to perform ADLs independently or with assistance (bathe, groom, dress, toilet and feed)  Outcome: Progressing  Note: Patient was encouraged to take a shower today with staff at first patient was not agreeable but after sitting in the chair patient agreed.  verbalized he was grateful to see how to use a shower chair and is anticipating getting one for home.

## 2024-07-03 NOTE — PROGRESS NOTES
Hospital Medicine Daily Progress Note    Date of Service  7/3/2024    Chief Complaint  Nancy King is a 88 y.o. female with HTN, HFpEF, Afib on apixaban, T2DM, CAD s/p NSTEMI admitted 6/29/2024 with hypogycemia and decompensated HFpEF.    Hospital Course  No notes on file    Interval Problem Update  Patient was seen and examined at bedside.  No acute events overnight. Patient is resting comfortably in bed and in no acute distress.     Plan to discharge home on hospice later this week. Case management assisting with DME and logistics.     I have discussed this patient's plan of care and discharge plan at IDT rounds today with Case Management, Nursing, Nursing leadership, and other members of the IDT team.    Consultants/Specialty  psychology    Code Status  DNAR/DNI    Disposition  The patient is medically cleared for discharge to home or a post-acute facility.  Anticipate discharge to: hospice    I have placed the appropriate orders for post-discharge needs.    Review of Systems  Review of Systems   Unable to perform ROS: Dementia   Constitutional:  Negative for chills and fever.   Respiratory:  Negative for shortness of breath.    Cardiovascular:  Negative for chest pain.   Gastrointestinal:  Negative for abdominal pain.   Musculoskeletal:  Negative for back pain.   Neurological:  Negative for headaches.        Physical Exam  Temp:  [36.7 °C (98.1 °F)-37 °C (98.6 °F)] 36.7 °C (98.1 °F)  Pulse:  [51-63] 51  Resp:  [15-18] 17  BP: (112-160)/(51-56) 112/51  SpO2:  [92 %-99 %] 94 %    Physical Exam  Vitals and nursing note reviewed. Exam conducted with a chaperone present ( at bedside).   Constitutional:       Appearance: She is not ill-appearing.      Interventions: Nasal cannula in place.   HENT:      Head:      Comments: Bitemporal wasting     Nose: Nose normal.   Eyes:      General: No scleral icterus.     Conjunctiva/sclera: Conjunctivae normal.   Cardiovascular:      Rate and Rhythm: Normal rate and  regular rhythm.      Pulses: Normal pulses.      Heart sounds: Murmur (2/6 systolic, does not radiate to carotids) heard.   Pulmonary:      Effort: Pulmonary effort is normal.      Breath sounds: Normal breath sounds. No wheezing or rales.      Comments: Faint crackles in lung base  Abdominal:      General: Abdomen is flat. Bowel sounds are normal.      Palpations: Abdomen is soft.      Tenderness: There is no abdominal tenderness. There is no guarding or rebound.   Genitourinary:     Comments: No lan  Musculoskeletal:         General: No swelling or deformity.      Cervical back: Neck supple.   Skin:     General: Skin is warm and dry.      Findings: No bruising.   Neurological:      Mental Status: She is alert.      Comments: Appropriately conversant, moves all extremities   Psychiatric:         Attention and Perception: Attention and perception normal.         Mood and Affect: Mood is depressed. Affect is flat.         Speech: Speech normal.         Behavior: Behavior is slowed. Behavior is cooperative.         Cognition and Memory: Cognition and memory normal.         Fluids    Intake/Output Summary (Last 24 hours) at 7/3/2024 1543  Last data filed at 7/3/2024 0600  Gross per 24 hour   Intake 120 ml   Output 450 ml   Net -330 ml         Laboratory        Recent Labs     07/01/24  0220 07/02/24  0125 07/03/24  0402   SODIUM 135 134* 135   POTASSIUM 3.6 2.9* 3.0*   CHLORIDE 91* 91* 93*   CO2 30 29 29   GLUCOSE 124* 193* 164*   BUN 24* 33* 37*   CREATININE 1.12 1.58* 1.64*   CALCIUM 9.1 9.1 9.0                   Imaging  DX-CHEST-PORTABLE (1 VIEW)   Final Result      Improvement in small right pleural effusion with trace residual.      EC-ECHOCARDIOGRAM COMPLETE W/ CONT   Final Result      CT-RENAL COLIC EVALUATION(A/P W/O)   Final Result      1.  There is no evidence of nephrolithiasis, urolithiasis, or hydronephrosis.   2.  Probable 4 mm phlebolith in the right hemipelvis as noted above.   3.  Colonic  diverticulosis with no evidence of diverticulitis.   4.  Bilateral lower lobe airspace disease is suspicious for multifocal pneumonia with associated bilateral pleural effusions as described above.   5.  Chronic bony changes as described above.      CT-HEAD W/O   Final Result      1.  There is no acute intracranial hemorrhage or infarct.      2.  White matter lucencies most consistent with small vessel ischemic change versus demyelination or gliosis.      3.  There is cerebral atrophy.               DX-CHEST-PORTABLE (1 VIEW)   Final Result      1.  Enlarged cardiac silhouette with changes of pulmonary edema.   2.  Small right pleural effusion.           Assessment/Plan  * Acute on chronic heart failure with preserved ejection fraction (HCC)- (present on admission)  Assessment & Plan  Seemingly decompensated by pleural effusion, pulmonary edema with AHRF, peripheral edema, elevated BNP  CXR demonstrates pulmonary vascular congestion / pulmonary edema  Continue metoprolol  Lasix IV 40 mg BID  TTE with intact LVEF not indicative of ischemic cardiomyopathy    Acute respiratory failure with hypoxia (HCC)- (present on admission)  Assessment & Plan  On room air    Acute encephalopathy- (present on admission)  Assessment & Plan  Resolved  Likely due to hypoglycemia from depression and inadequate PO intake  Bcx drawn in ED 6/29 - NGTD  UA not indicative of infection    Pleural effusion on right- (present on admission)  Assessment & Plan  Not a candidate for thoracentesis due to apixaban for Afib  Likely due to decompensated HFpEF  No signs of SIRS to suggest parapneumonic effusion nor empyema  Monitor with repeat CXR after IV diuresis    Pulmonary infiltrates- (present on admission)  Assessment & Plan  Noted on CT chest  Likely pulmonary edema  Procalcitonin negative - no expected benefit of CAP antibiotics    Moderate protein-calorie malnutrition (HCC)- (present on admission)  Assessment & Plan  Unclear weight loss amount,  bitemporal wasting with anorexia  RD consult    Paroxysmal atrial fibrillation (HCC)- (present on admission)  Assessment & Plan  Continue amiodarone, metoprolol, apixaban    Type 2 diabetes mellitus with hypoglycemia without coma, without long-term current use of insulin (HCC)- (present on admission)  Assessment & Plan  A1c 6.2  Hypoglycemic likely due to depression and anorexia  Monitor POCT q6h, SSI deferred in absence of indication for strict BG control  Unrestricted diet due to anorexia  Continue rosuvastatin  Holding sitagliptin      Severe episode of recurrent major depressive disorder, without psychotic features (HCC)- (present on admission)  Assessment & Plan  Attributed to symptomatic coronary artery disease  Causing anorexia and anhedonia sufficient for hypoglycemia and dehydration  Psychology consult  Family support verified -  willing to care for her at home, family will respect her wishes for SNF or Hospice  Agreeable to initiation of sertraline (ELIJAH JACK)    Advance care planning  Assessment & Plan  Goals of care discussion with patient and . Patient is A&O x1-2 and unable to make her own decisions. Discussed patients underlying dementia in addition to her chronic medical conditions. Patients  has decided for patient to discharge home on hospice. Will continue to work on logistics [place]. Total of 21 minutes spent in discussion and coordination of advance care planning.     Delirium  Assessment & Plan  CTH with atrophy and microvascular disease, probable mild underlying cognitive impairment unmasked in hospital  MAR reviewed - no centrally-acting medications  Melatonin QHS, trazodone HS PRN to promote sleep-wake cycle  Re-orient frequently, familiar persons at bedside when able  No metabolic abnormalities on BMP    Acquired hypothyroidism- (present on admission)  Assessment & Plan  Continue levothyroxine    Primary  hypertension- (present on admission)  Assessment & Plan  Continue cardiac meds per CAD plan    Coronary artery disease of native artery of native heart with stable angina pectoris (HCC)- (present on admission)  Assessment & Plan  Stable  Due to multivessel CAD which was not amenable to PCI, not a candidate for CABG  EKG and troponin not indicative of acute ischemia  Continue lisinopril, apixaban, ranolazine, imdur, rosuvastatin, metoprolol  SLN, EKG, troponin PRN chest pain      VTE prophylaxis:   SCDs/TEDs    I have performed a physical exam and reviewed and updated ROS and Plan today (7/3/2024). In review of yesterday's note (7/2/2024), there are no changes except as documented above.    Greater than 46 minutes spent prepping to see patient (e.g. review of tests) obtaining and/or reviewing separately obtained history. Performing a medically appropriate examination and/ evaluation.  Counseling and educating the patient/family/caregiver.  Ordering medications, tests, or procedures.  Referring and communicating with other health care professionals.  Documenting clinical information in EPIC.  Independently interpreting results and communicating results to patient/family/caregiver.  Care coordination.

## 2024-07-03 NOTE — PROGRESS NOTES
Monitor summary: SB/SR 52-63, UT 0.14, QRS 0.08, QT 0.65, with rare PACs per strip from monitor room.

## 2024-07-03 NOTE — FACE TO FACE
Face to Face Note  -  Durable Medical Equipment    Rob Delatorre D.O. - NPI: [id number]  I certify that this patient is under my care and that they had a durable medical equipment(DME)face to face encounter by myself that meets the physician DME face-to-face encounter requirements with this patient on:    Date of encounter:   Patient:                    MRN:                       YOB: 2022  Nancy King  [id number]  1935     The encounter with the patient was in whole, or in part, for the following medical condition, which is the primary reason for durable medical equipment:  Other - heart failure complicated by comorbidities of dementia    I certify that, based on my findings, the following durable medical equipment is medically necessary:    Beds.    My Clinical findings support the need for the above equipment due to:  Other - heart failure complicated by comorbidities of dementia

## 2024-07-03 NOTE — DIETARY
Nutrition Update: Follow-up for monitoring for adequate PO intake  Day 4 of admit.  Nancy King is a 88 y.o. female with admitting DX of Acute encephalopathy and acute on chronic HF.     Pt is on regular diet with Two Delgado shakes with meals. Pt with  at bedside who reports pt is eating ~50% of meals when she gets food she likes and <50% of meal when it is food she does not like. Per ADLs, PO intake on average is 25-50% of meals. Pt reports drinking TwoCal shake up to one shake per day. Pt reports usual weight is 140 lbs and lost weight over last 6 months which is 9.2% weight loss in 6 months, notable but not clinically significant. Per weight hx, pt weighed 140 lbs on 10/4/24, which would be 9.2% weight loss in 9 months. DTI to coccyx noted, 2+ moderate edema to RLE/LLE. Last BM: 7/1 Type 6.     Malnutrition risk Moderate malnutrition in the context of chronic illness r/t MDD and other disease states as evidenced by mild muscle and mild fat wasting. Dx already on problem list.     Problem: Nutritional:  Goal: Achieve adequate nutritional intake  Description: Patient will consume 25-50% of meals  Outcome: Progressing    Agree with regular diet at this time, changed TwoCal shakes to daily per pt preference.    RD following.

## 2024-07-03 NOTE — DISCHARGE PLANNING
DME order/F2F for hospital bed along with support clinical documentation faxed to [facility] at [phone].

## 2024-07-04 LAB
BACTERIA BLD CULT: NORMAL
BACTERIA BLD CULT: NORMAL
SIGNIFICANT IND 70042: NORMAL
SIGNIFICANT IND 70042: NORMAL
SITE SITE: NORMAL
SITE SITE: NORMAL
SOURCE SOURCE: NORMAL
SOURCE SOURCE: NORMAL

## 2024-07-04 PROCEDURE — 97530 THERAPEUTIC ACTIVITIES: CPT

## 2024-07-04 PROCEDURE — 700102 HCHG RX REV CODE 250 W/ 637 OVERRIDE(OP): Performed by: STUDENT IN AN ORGANIZED HEALTH CARE EDUCATION/TRAINING PROGRAM

## 2024-07-04 PROCEDURE — 97110 THERAPEUTIC EXERCISES: CPT

## 2024-07-04 PROCEDURE — A9270 NON-COVERED ITEM OR SERVICE: HCPCS | Performed by: STUDENT IN AN ORGANIZED HEALTH CARE EDUCATION/TRAINING PROGRAM

## 2024-07-04 PROCEDURE — 770020 HCHG ROOM/CARE - TELE (206)

## 2024-07-04 PROCEDURE — 99231 SBSQ HOSP IP/OBS SF/LOW 25: CPT | Performed by: INTERNAL MEDICINE

## 2024-07-04 RX ADMIN — SERTRALINE HYDROCHLORIDE 50 MG: 50 TABLET ORAL at 06:03

## 2024-07-04 RX ADMIN — LEVOTHYROXINE SODIUM 50 MCG: 0.05 TABLET ORAL at 06:03

## 2024-07-04 RX ADMIN — RANOLAZINE 500 MG: 500 TABLET, EXTENDED RELEASE ORAL at 18:15

## 2024-07-04 RX ADMIN — AMIODARONE HYDROCHLORIDE 200 MG: 200 TABLET ORAL at 06:03

## 2024-07-04 RX ADMIN — APIXABAN 5 MG: 5 TABLET, FILM COATED ORAL at 18:15

## 2024-07-04 RX ADMIN — ROSUVASTATIN CALCIUM 20 MG: 20 TABLET, FILM COATED ORAL at 18:14

## 2024-07-04 RX ADMIN — ISOSORBIDE MONONITRATE 60 MG: 60 TABLET, EXTENDED RELEASE ORAL at 06:02

## 2024-07-04 RX ADMIN — LEVOTHYROXINE SODIUM 50 MCG: 0.05 TABLET ORAL at 18:15

## 2024-07-04 RX ADMIN — APIXABAN 5 MG: 5 TABLET, FILM COATED ORAL at 06:03

## 2024-07-04 RX ADMIN — METOPROLOL SUCCINATE 50 MG: 25 TABLET, EXTENDED RELEASE ORAL at 06:02

## 2024-07-04 RX ADMIN — Medication 10 MG: at 20:17

## 2024-07-04 RX ADMIN — RANOLAZINE 500 MG: 500 TABLET, EXTENDED RELEASE ORAL at 06:02

## 2024-07-04 ASSESSMENT — COGNITIVE AND FUNCTIONAL STATUS - GENERAL
CLIMB 3 TO 5 STEPS WITH RAILING: TOTAL
MOBILITY SCORE: 10
STANDING UP FROM CHAIR USING ARMS: A LOT
WALKING IN HOSPITAL ROOM: TOTAL
SUGGESTED CMS G CODE MODIFIER MOBILITY: CL
MOVING FROM LYING ON BACK TO SITTING ON SIDE OF FLAT BED: A LOT
MOVING TO AND FROM BED TO CHAIR: TOTAL
TURNING FROM BACK TO SIDE WHILE IN FLAT BAD: A LITTLE

## 2024-07-04 ASSESSMENT — ENCOUNTER SYMPTOMS
ABDOMINAL PAIN: 0
FEVER: 0
HEADACHES: 0
BACK PAIN: 0
CHILLS: 0
SHORTNESS OF BREATH: 0

## 2024-07-04 ASSESSMENT — PAIN DESCRIPTION - PAIN TYPE
TYPE: ACUTE PAIN

## 2024-07-04 ASSESSMENT — CHA2DS2 SCORE
PRIOR STROKE OR TIA OR THROMBOEMBOLISM: YES
HYPERTENSION: YES
SEX: FEMALE
DIABETES: YES
CHF OR LEFT VENTRICULAR DYSFUNCTION: YES
VASCULAR DISEASE: YES
AGE 75 OR GREATER: YES
AGE 65 TO 74: NO
CHA2DS2 VASC SCORE: 9

## 2024-07-04 ASSESSMENT — GAIT ASSESSMENTS: GAIT LEVEL OF ASSIST: UNABLE TO PARTICIPATE

## 2024-07-04 ASSESSMENT — FIBROSIS 4 INDEX: FIB4 SCORE: 2.19

## 2024-07-04 NOTE — HOSPITAL COURSE
"Nancy King is a 88 y.o. female with HTN, HFpEF, Afib on apixaban, T2DM, CAD s/p NSTEMI who presented 6/29/2024 with hypoglycemia.     She reports for the past 2 weeks she's been feeling depressed. She's had no appetite without nausea. She has lost weight but unsure how much. She feels she is taking 1 step forward and 2 steps back. She lives with her  and states \"that has to be enough.\" She has been feeling dizzy and has frequent chest pain. The chest pain has been unchanged for many months. She feels short of breath whether upright or laying down. She has a nonproductive cough. She denies bleeding, F/C, bowel/bladder dysfunction, falls, peripheral edema. She was altered this morning prompting family to call EMS. BG was reportedly 39 and she responded well to administration of dextrose.     In the ED she is bradycardic. CBC normal. CMP normal. UA is cloudy with proteinuria and ketonuria but no pyuria. BAL and UDS normal. Troponin 33. Ammonia normal. Lactate normal. BNP 7.79K. CXR demonstrates Right-sided pleural effusion and vascular congestion consistent with pulmonary edema. CTH no acute process, some microvascular disease and atrophy. CT renal negative for abdominal abnormality but with some infiltrates in bilateral lung files. Received unasyn and azithromycin in the ED. Procalcitonin is negative. EKG demonstrates sinus bradycardia, LVH, Qtc 472.  "

## 2024-07-04 NOTE — CARE PLAN
The patient is Stable - Low risk of patient condition declining or worsening    Shift Goals  Clinical Goals: Maintain skin integrity  Patient Goals: Rest  Family Goals: DEBBIE    Progress made toward(s) clinical / shift goals:      Problem: Skin Integrity  Goal: Skin integrity is maintained or improved  Outcome: Progressing  Pt turned and repositioned Q2H or as requested. Barrier cream and mepilexes used to prevent skin breakdown on delicate perineal areas and bony prominences. Linen changes provided as needed to avoid risk of developing pressure ulcers.     Patient is not progressing towards the following goals:

## 2024-07-04 NOTE — PROGRESS NOTES
"Hospital Medicine Daily Progress Note    Date of Service  7/4/2024    Chief Complaint  Nancy King is a 88 y.o. female with HTN, HFpEF, Afib on apixaban, T2DM, CAD s/p NSTEMI admitted 6/29/2024 with hypogycemia and decompensated HFpEF.    Hospital Course  Nancy King is a 88 y.o. female with HTN, HFpEF, Afib on apixaban, T2DM, CAD s/p NSTEMI who presented 6/29/2024 with hypoglycemia.     She reports for the past 2 weeks she's been feeling depressed. She's had no appetite without nausea. She has lost weight but unsure how much. She feels she is taking 1 step forward and 2 steps back. She lives with her  and states \"that has to be enough.\" She has been feeling dizzy and has frequent chest pain. The chest pain has been unchanged for many months. She feels short of breath whether upright or laying down. She has a nonproductive cough. She denies bleeding, F/C, bowel/bladder dysfunction, falls, peripheral edema. She was altered this morning prompting family to call EMS. BG was reportedly 39 and she responded well to administration of dextrose.     In the ED she is bradycardic. CBC normal. CMP normal. UA is cloudy with proteinuria and ketonuria but no pyuria. BAL and UDS normal. Troponin 33. Ammonia normal. Lactate normal. BNP 7.79K. CXR demonstrates Right-sided pleural effusion and vascular congestion consistent with pulmonary edema. CTH no acute process, some microvascular disease and atrophy. CT renal negative for abdominal abnormality but with some infiltrates in bilateral lung files. Received unasyn and azithromycin in the ED. Procalcitonin is negative. EKG demonstrates sinus bradycardia, LVH, Qtc 472.    Interval Problem Update  Patient was seen and examined at bedside.  No acute events overnight. Patient is resting comfortably in bed and in no acute distress.  updated at bedside.     Discharge home with home health and hospice on 07/05    I have discussed this patient's plan of care and " discharge plan at IDT rounds today with Case Management, Nursing, Nursing leadership, and other members of the IDT team.    Consultants/Specialty  psychology    Code Status  DNAR/DNI    Disposition  Medically Cleared  I have placed the appropriate orders for post-discharge needs.    Review of Systems  Review of Systems   Unable to perform ROS: Dementia   Constitutional:  Negative for chills and fever.   Respiratory:  Negative for shortness of breath.    Cardiovascular:  Negative for chest pain.   Gastrointestinal:  Negative for abdominal pain.   Musculoskeletal:  Negative for back pain.   Neurological:  Negative for headaches.        Physical Exam  Temp:  [36.4 °C (97.5 °F)-36.8 °C (98.2 °F)] 36.8 °C (98.2 °F)  Pulse:  [47-54] 51  Resp:  [16-18] 16  BP: (112-126)/(51-53) 126/52  SpO2:  [89 %-95 %] 90 %    Physical Exam  Vitals and nursing note reviewed. Exam conducted with a chaperone present ( at bedside).   Constitutional:       Appearance: She is not ill-appearing.      Interventions: Nasal cannula in place.   HENT:      Head:      Comments: Bitemporal wasting     Nose: Nose normal.   Eyes:      General: No scleral icterus.     Conjunctiva/sclera: Conjunctivae normal.   Cardiovascular:      Rate and Rhythm: Normal rate and regular rhythm.      Pulses: Normal pulses.      Heart sounds: Murmur (2/6 systolic, does not radiate to carotids) heard.   Pulmonary:      Effort: Pulmonary effort is normal.      Breath sounds: Normal breath sounds. No wheezing or rales.      Comments: Faint crackles in lung base  Abdominal:      General: Abdomen is flat. Bowel sounds are normal.      Palpations: Abdomen is soft.      Tenderness: There is no abdominal tenderness. There is no guarding or rebound.   Genitourinary:     Comments: No lan  Musculoskeletal:         General: No swelling or deformity.      Cervical back: Neck supple.   Skin:     General: Skin is warm and dry.      Findings: No bruising.   Neurological:       Mental Status: She is alert.      Comments: Appropriately conversant, moves all extremities   Psychiatric:         Attention and Perception: Attention and perception normal.         Mood and Affect: Mood is depressed. Affect is flat.         Speech: Speech normal.         Behavior: Behavior is slowed. Behavior is cooperative.         Cognition and Memory: Cognition and memory normal.         Fluids  No intake or output data in the 24 hours ending 07/04/24 1354        Laboratory        Recent Labs     07/02/24  0125 07/03/24  0402   SODIUM 134* 135   POTASSIUM 2.9* 3.0*   CHLORIDE 91* 93*   CO2 29 29   GLUCOSE 193* 164*   BUN 33* 37*   CREATININE 1.58* 1.64*   CALCIUM 9.1 9.0                   Imaging  DX-CHEST-PORTABLE (1 VIEW)   Final Result      Improvement in small right pleural effusion with trace residual.      EC-ECHOCARDIOGRAM COMPLETE W/ CONT   Final Result      CT-RENAL COLIC EVALUATION(A/P W/O)   Final Result      1.  There is no evidence of nephrolithiasis, urolithiasis, or hydronephrosis.   2.  Probable 4 mm phlebolith in the right hemipelvis as noted above.   3.  Colonic diverticulosis with no evidence of diverticulitis.   4.  Bilateral lower lobe airspace disease is suspicious for multifocal pneumonia with associated bilateral pleural effusions as described above.   5.  Chronic bony changes as described above.      CT-HEAD W/O   Final Result      1.  There is no acute intracranial hemorrhage or infarct.      2.  White matter lucencies most consistent with small vessel ischemic change versus demyelination or gliosis.      3.  There is cerebral atrophy.               DX-CHEST-PORTABLE (1 VIEW)   Final Result      1.  Enlarged cardiac silhouette with changes of pulmonary edema.   2.  Small right pleural effusion.           Assessment/Plan  * Acute on chronic heart failure with preserved ejection fraction (HCC)- (present on admission)  Assessment & Plan  Seemingly decompensated by pleural effusion,  pulmonary edema with AHRF, peripheral edema, elevated BNP  CXR demonstrates pulmonary vascular congestion / pulmonary edema  Continue metoprolol  Lasix IV 40 mg BID  TTE with intact LVEF not indicative of ischemic cardiomyopathy    Acute respiratory failure with hypoxia (HCC)- (present on admission)  Assessment & Plan  On room air    Acute encephalopathy- (present on admission)  Assessment & Plan  Resolved  Likely due to hypoglycemia from depression and inadequate PO intake  Bcx drawn in ED 6/29 - NGTD  UA not indicative of infection    Pleural effusion on right- (present on admission)  Assessment & Plan  Not a candidate for thoracentesis due to apixaban for Afib  Likely due to decompensated HFpEF  No signs of SIRS to suggest parapneumonic effusion nor empyema  Monitor with repeat CXR after IV diuresis    Pulmonary infiltrates- (present on admission)  Assessment & Plan  Noted on CT chest  Likely pulmonary edema  Procalcitonin negative - no expected benefit of CAP antibiotics    Moderate protein-calorie malnutrition (HCC)- (present on admission)  Assessment & Plan  Unclear weight loss amount, bitemporal wasting with anorexia  RD consult    Paroxysmal atrial fibrillation (HCC)- (present on admission)  Assessment & Plan  Continue amiodarone, metoprolol, apixaban    Type 2 diabetes mellitus with hypoglycemia without coma, without long-term current use of insulin (HCC)- (present on admission)  Assessment & Plan  A1c 6.2  Hypoglycemic likely due to depression and anorexia  Monitor POCT q6h, SSI deferred in absence of indication for strict BG control  Unrestricted diet due to anorexia  Continue rosuvastatin  Holding sitagliptin      Severe episode of recurrent major depressive disorder, without psychotic features (HCC)- (present on admission)  Assessment & Plan  Attributed to symptomatic coronary artery disease  Causing anorexia and anhedonia sufficient for hypoglycemia and dehydration  Psychology consult  Family support  verified -  willing to care for her at home, family will respect her wishes for SNF or Hospice  Agreeable to initiation of sertraline (ELIJAH JACK)    Advance care planning  Assessment & Plan  Goals of care discussion with patient and . Patient is A&O x1-2 and unable to make her own decisions. Discussed patients underlying dementia in addition to her chronic medical conditions. Patients  has decided for patient to discharge home on hospice. Will continue to work [place]. Total of 21 minutes spent in discussion and coordination of advance care planning.     Delirium  Assessment & Plan  CTH with atrophy and microvascular disease, probable mild underlying cognitive impairment unmasked in hospital  MAR reviewed - no centrally-acting medications  Melatonin QHS, trazodone HS PRN to promote sleep-wake cycle  Re-orient frequently, familiar persons at bedside when able  No metabolic abnormalities on BMP    Acquired hypothyroidism- (present on admission)  Assessment & Plan  Continue levothyroxine    Primary hypertension- (present on admission)  Assessment & Plan  Continue cardiac meds per CAD plan    Coronary artery disease of native artery of native heart with stable angina pectoris (HCC)- (present on admission)  Assessment & Plan  Stable  Due to multivessel CAD which was not amenable to PCI, not a candidate for CABG  EKG and troponin not indicative of acute ischemia  Continue lisinopril, apixaban, ranolazine, imdur, rosuvastatin, metoprolol  SLN, EKG, troponin PRN chest pain      VTE prophylaxis:   SCDs/TEDs    I have performed a physical exam and reviewed and updated ROS and Plan today (7/4/2024). In review of yesterday's note (7/3/2024), there are no changes except as documented above.      Progress noted updated on 07/29 at request of DME provider for reason of hospital bed request and order.     Patient discharge on end of  life care. No hospice provider geographically available to patient and therefore no hospice provider able to provide DME. Therefore order placed for DME provider to provide appropriate DME for patients care including hospital bed.

## 2024-07-04 NOTE — PROGRESS NOTES
Monitor summary: SB, HR 48-54, WI .16, QRS .09, QT .62 with rare pvc per strip from monitor room

## 2024-07-04 NOTE — CARE PLAN
The patient is Stable - Low risk of patient condition declining or worsening    Shift Goals  Clinical Goals: Monitor Neuro Status  Patient Goals: Rest  Family Goals: DEBBIE    Progress made toward(s) clinical / shift goals:      Problem: Neuro Status  Goal: Neuro status will remain stable or improve  Outcome: Progressing   Qshift neuro checks in place. Pt's neurological status (A/Ox2) remains unchanged throughout shift. Bed alarm is on, call light within reach.     Patient is not progressing towards the following goals:

## 2024-07-04 NOTE — THERAPY
Physical Therapy   Daily Treatment     Patient Name: Nancy King  Age:  88 y.o., Sex:  female  Medical Record #: [id number]  Today's Date: 7/4/2024     Precautions  Precautions: Fall Risk    Assessment    The pt presents today agreeable to participate in tx session, and w/ her /caregiver present throughout.  She displays overall improvement in bed mobility and activity tolerance requiring modA for supine<>sit w/ HOB flat and use of bed rails.  The pt was instructed on supine and seated there-ex (see flowsheet), and provided w/ a handout for reference w/ good carryover demonstrated.  She was also able to complete STS x10 EOB w/ FWW and maxA for anterior weight shift and posterior support.  Further OOB mobility was deferred given pt fatigue, and inability to achieve full extension w/ posterior lean d/t what appears to be apprehension.  Recommend placement v. home on hospice (as pt's  reports this is currently the plan) given pt's inability to care for self and high risk of future falls.  Will continue to follow for acute PT needs.     Plan    Treatment Plan Status: Continue Current Treatment Plan  Type of Treatment: Bed Mobility, Gait Training, Neuro Re-Education / Balance, Therapeutic Activities, Therapeutic Exercise  Treatment Frequency: 4 Times per Week  Treatment Duration: Until Therapy Goals Met    DC Equipment Recommendations: Unable to determine at this time  Discharge Recommendations: Other - (SNF v. home on hospice depending on pt and her 's goals)      Subjective/Objective       07/04/24 1042   Cognition    Cognition / Consciousness WDL   Level of Consciousness Alert   Comments pt pleasant and cooperative   Passive ROM Lower Body   Passive ROM Lower Body WDL   Active ROM Lower Body    Active ROM Lower Body  WDL   Comments observed during functional mobility tasks   Strength Lower Body   Lower Body Strength  X   Comments Gross BLE weakness   Sensation Lower Body   Lower Extremity  Sensation   X   Comments pt endorses numbness in B feet, sensation intact to LT/DP   Supine Lower Body Exercise   Supine Lower Body Exercises Yes   Ankle Pumps Reciprocal;1 set of 15   Sitting Lower Body Exercises   Sitting Lower Body Exercises Yes   Long Arc Quad 1 set of 10;Bilateral   Marching Reciprocal;1 set of 15   Sit to Stand 1 set of 10   Standing Lower Body Exercises   Standing Lower Body Exercises Yes   Balance   Sitting Balance (Static) Fair   Sitting Balance (Dynamic) Fair   Standing Balance (Static) Poor -   Standing Balance (Dynamic) Trace +   Weight Shift Sitting Fair   Weight Shift Standing Poor   Skilled Intervention Compensatory Strategies;Facilitation;Tactile Cuing;Verbal Cuing   Comments stand w/ FWW   Bed Mobility    Supine to Sit Moderate Assist   Sit to Supine Moderate Assist   Scooting Moderate Assist   Rolling Minimal Assist to Rt.;Minimum Assist to Lt.   Skilled Intervention Compensatory Strategies;Sequencing;Tactile Cuing;Verbal Cuing;Facilitation   Comments HOB flat, use of bed rails   Gait Analysis   Gait Level Of Assist Unable to Participate   Weight Bearing Status no restrictions   Comments Pt unable to achieve full extension when standing 2/2 apprehension and posterior lean   Functional Mobility   Sit to Stand Maximal Assist   Bed, Chair, Wheelchair Transfer Unable to Participate   Mobility STS EOBx10 using FWW, unable to achieve full extension d/t apprehension and posterior lean   Skilled Intervention Facilitation;Compensatory Strategies;Sequencing;Tactile Cuing;Verbal Cuing   Activity Tolerance   Sitting Edge of Bed 20min   Standing <1min   Short Term Goals    Short Term Goal # 1 Pt will perform supine to/from sit with flat bed and no features SBA in 6 visits to progress bed mobility   Goal Outcome # 1 goal not met   Short Term Goal # 2 Pt will perform sit to/from stand with FWW SBA in 6 visits to progress transfers   Goal Outcome # 2 Goal not met   Short Term Goal # 3 Pt will amb  with FWW 200ft SBA in 6 visits to progress with functional household mobility.   Goal Outcome # 3 Goal not met   Education Group   Education Provided Role of Physical Therapist;Exercises - Supine;Exercises - Seated   Role of Physical Therapist Patient Response Patient;Significant Other;Acceptance;Explanation;Demonstration;Action Demonstration   Exercises - Supine Patient Response Patient;Significant Other;Acceptance;Explanation;Demonstration;Handout;Verbal Demonstration;Action Demonstration   Exercises - Seated Patient Response Patient;Significant Other;Acceptance;Explanation;Demonstration;Handout;Verbal Demonstration;Action Demonstration   Additional Comments pt and pt's  both receptive of edu provided   Interdisciplinary Plan of Care Collaboration   IDT Collaboration with  Nursing;Family / Caregiver   Patient Position at End of Therapy In Bed;Bed Alarm On;Call Light within Reach;Tray Table within Reach;Phone within Reach;Family / Friend in Room  (pt's  present throughout)   Collaboration Comments regarding outcome of tx session   Session Information   Date / Session Number  7/4- 2 (2/4, 7/7)

## 2024-07-05 VITALS
BODY MASS INDEX: 21.67 KG/M2 | DIASTOLIC BLOOD PRESSURE: 61 MMHG | TEMPERATURE: 98.1 F | HEIGHT: 65 IN | OXYGEN SATURATION: 93 % | SYSTOLIC BLOOD PRESSURE: 150 MMHG | HEART RATE: 53 BPM | WEIGHT: 130.07 LBS | RESPIRATION RATE: 16 BRPM

## 2024-07-05 PROCEDURE — A9270 NON-COVERED ITEM OR SERVICE: HCPCS | Performed by: STUDENT IN AN ORGANIZED HEALTH CARE EDUCATION/TRAINING PROGRAM

## 2024-07-05 PROCEDURE — 99239 HOSP IP/OBS DSCHRG MGMT >30: CPT | Performed by: STUDENT IN AN ORGANIZED HEALTH CARE EDUCATION/TRAINING PROGRAM

## 2024-07-05 PROCEDURE — 700102 HCHG RX REV CODE 250 W/ 637 OVERRIDE(OP): Performed by: STUDENT IN AN ORGANIZED HEALTH CARE EDUCATION/TRAINING PROGRAM

## 2024-07-05 RX ADMIN — AMIODARONE HYDROCHLORIDE 200 MG: 200 TABLET ORAL at 04:51

## 2024-07-05 RX ADMIN — SERTRALINE HYDROCHLORIDE 50 MG: 50 TABLET ORAL at 04:53

## 2024-07-05 RX ADMIN — APIXABAN 5 MG: 5 TABLET, FILM COATED ORAL at 04:52

## 2024-07-05 RX ADMIN — LEVOTHYROXINE SODIUM 50 MCG: 0.05 TABLET ORAL at 04:50

## 2024-07-05 ASSESSMENT — FIBROSIS 4 INDEX: FIB4 SCORE: 2.19

## 2024-07-05 ASSESSMENT — PAIN DESCRIPTION - PAIN TYPE: TYPE: ACUTE PAIN

## 2024-07-05 NOTE — CARE PLAN
The patient is Stable - Low risk of patient condition declining or worsening    Shift Goals  Clinical Goals: Safety, comfort  Patient Goals: Rest  Family Goals: Updates    Progress made toward(s) clinical / shift goals:    Problem: Knowledge Deficit - Standard  Goal: Patient and family/care givers will demonstrate understanding of plan of care, disease process/condition, diagnostic tests and medications  Outcome: Progressing     Problem: Fall Risk  Goal: Patient will remain free from falls  Outcome: Progressing     Problem: Neuro Status  Goal: Neuro status will remain stable or improve  Outcome: Progressing     Problem: Care Map:  Day Before Discharge Optimal Outcome for the Heart Failure Patient  Goal: Day Before Discharge:  Optimal Care of the heart failure patient  Outcome: Progressing       Patient is not progressing towards the following goals:

## 2024-07-05 NOTE — PROGRESS NOTES
Report received from Elvia Bennett. Assumed pt care. Pt sitting up for meal. Pt A&O x 2. Fall precautions in place, call light and belongings within reach, bed in lowest position. No signs of distress.

## 2024-07-05 NOTE — PROGRESS NOTES
Monitor summary: SB 47-51, ND 0.18, QRS 0.09, QT 0.64, with rare PVCs, PACs and couplets per strip from monitor room.

## 2024-07-05 NOTE — PROGRESS NOTES
Assessment completed.  Pt A&Ox2 (self, place).  Pt denies any pain at this time. POC discussed; communication board updated.    Bed in locked, lowest position.  Call light and belongings within reach.  Needs met.

## 2024-07-05 NOTE — DISCHARGE INSTRUCTIONS
Follow-up with hospice    Hypoglycemia  Hypoglycemia is when the sugar (glucose) level in your blood is too low. Low blood sugar can happen to people who have diabetes and people who do not have diabetes. Low blood sugar can happen quickly, and it can be an emergency.  What are the causes?  This condition happens most often in people who have diabetes. It may be caused by:  Diabetes medicine.  Not eating enough, or not eating often enough.  Doing more physical activity.  Drinking alcohol on an empty stomach.  If you do not have diabetes, this condition may be caused by:  A tumor in the pancreas.  Not eating enough, or not eating for long periods at a time (fasting).  A very bad infection or illness.  Problems after having weight loss (bariatric) surgery.  Kidney failure or liver failure.  Certain medicines.  What increases the risk?  This condition is more likely to develop in people who:  Have diabetes and take medicines to lower their blood sugar.  Abuse alcohol.  Have a very bad illness.  What are the signs or symptoms?  Mild  Hunger.  Sweating and feeling clammy.  Feeling dizzy or light-headed.  Being sleepy or having trouble sleeping.  Feeling like you may vomit (nauseous).  A fast heartbeat.  A headache.  Blurry vision.  Mood changes, such as:  Being grouchy.  Feeling worried or nervous (anxious).  Tingling or loss of feeling (numbness) around your mouth, lips, or tongue.  Moderate  Confusion and poor judgment.  Behavior changes.  Weakness.  Uneven heartbeat.  Trouble with moving (coordination).  Very low  Very low blood sugar (severe hypoglycemia) is a medical emergency. It can cause:  Fainting.  Seizures.  Loss of consciousness (coma).  Death.  How is this treated?  Treating low blood sugar  Low blood sugar is often treated by eating or drinking something that has sugar in it right away. The food or drink should contain 15 grams of a fast-acting carb (carbohydrate). Options include:  4 oz (120 mL) of fruit  juice.  4 oz (120 mL) of regular soda (not diet soda).  A few pieces of hard candy. Check food labels to see how many pieces to eat for 15 grams.  1 Tbsp (15 mL) of sugar or honey.  4 glucose tablets.  1 tube of glucose gel.  Treating low blood sugar if you have diabetes  If you can think clearly and swallow safely, follow the 15:15 rule:  Take 15 grams of a fast-acting carb. Talk with your doctor about how much you should take.  Always keep a source of fast-acting carb with you, such as:  Glucose tablets (take 4 tablets).  A few pieces of hard candy. Check food labels to see how many pieces to eat for 15 grams.  4 oz (120 mL) of fruit juice.  4 oz (120 mL) of regular soda (not diet soda).  1 Tbsp (15 mL) of honey or sugar.  1 tube of glucose gel.  Check your blood sugar 15 minutes after you take the carb.  If your blood sugar is still at or below 70 mg/dL (3.9 mmol/L), take 15 grams of a carb again.  If your blood sugar does not go above 70 mg/dL (3.9 mmol/L) after 3 tries, get help right away.  After your blood sugar goes back to normal, eat a meal or a snack within 1 hour.    Treating very low blood sugar  If your blood sugar is below 54 mg/dL (3 mmol/L), you have very low blood sugar, or severe hypoglycemia. This is an emergency. Get medical help right away.  If you have very low blood sugar and you cannot eat or drink, you will need to be given a hormone called glucagon. A family member or friend should learn how to check your blood sugar and how to give you glucagon. Ask your doctor if you need to have an emergency glucagon kit at home.  Very low blood sugar may also need to be treated in a hospital.  Follow these instructions at home:  General instructions  Take over-the-counter and prescription medicines only as told by your doctor.  Stay aware of your blood sugar as told by your doctor.  If you drink alcohol:  Limit how much you have to:  0-1 drink a day for women who are not pregnant.  0-2 drinks a day for  men.  Know how much alcohol is in your drink. In the [place] one drink equals one 12 oz bottle of beer (355 mL), one 5 oz glass of wine (148 mL), or one 1½ oz glass of hard liquor (44 mL).  Be sure to eat food when you drink alcohol.  Know that your body absorbs alcohol quickly. This may lead to low blood sugar later. Be sure to keep checking your blood sugar.  Keep all follow-up visits.  If you have diabetes:    Always have a fast-acting carb (15 grams) with you to treat low blood sugar.  Follow your diabetes care plan as told by your doctor. Make sure you:  Know the symptoms of low blood sugar.  Check your blood sugar as often as told. Always check it before and after exercise.  Always check your blood sugar before you drive.  Take your medicines as told.  Follow your meal plan.  Eat on time. Do not skip meals.  Share your diabetes care plan with:  Your work or school.  People you live with.  Carry a card or wear jewelry that says you have diabetes.  Where to find more information  American Diabetes Association: www.diabetes.org  Contact a doctor if:  You have trouble keeping your blood sugar in your target range.  You have low blood sugar often.  Get help right away if:  You still have symptoms after you eat or drink something that contains 15 grams of fast-acting carb, and you cannot get your blood sugar above 70 mg/dL by following the 15:15 rule.  Your blood sugar is below 54 mg/dL (3 mmol/L).  You have a seizure.  You faint.  These symptoms may be an emergency. Get help right away. Call your local emergency services (911 in the [place]  Do not wait to see if the symptoms will go away.  Do not drive yourself to the hospital.  Summary  Hypoglycemia happens when the level of sugar (glucose) in your blood is too low.  Low blood sugar can happen to people who have diabetes and people who do not have diabetes. Low blood sugar can happen quickly, and it can be an emergency.  Make sure you know the symptoms of low blood  sugar and know how to treat it.  Always keep a source of sugar (fast-acting carb) with you to treat low blood sugar.  This information is not intended to replace advice given to you by your health care provider. Make sure you discuss any questions you have with your health care provider.  Document Revised: 11/18/2021 Document Reviewed: 11/18/2021  [employer] Patient Education © 2023 [employer]      Heart Failure Action Plan  A heart failure action plan helps you understand what to do when you have symptoms of heart failure. Your action plan is a color-coded plan that lists the symptoms to watch for and indicates what actions to take.  If you have symptoms in the red zone, you need medical care right away.  If you have symptoms in the yellow zone, you are having problems.  If you have symptoms in the green zone, you are doing well.  Follow the plan that was created by you and your health care provider. Review your plan each time you visit your health care provider.  Red zone  These signs and symptoms mean you should get medical help right away:  You have trouble breathing when resting.  You have a dry cough that is getting worse.  You have swelling or pain in your legs or abdomen that is getting worse.  You suddenly gain more than 2-3 lb (0.9-1.4 kg) in 24 hours, or more than 5 lb (2.3 kg) in a week. This amount may be more or less depending on your condition.  You have trouble staying awake or you feel confused.  You have chest pain.  You do not have an appetite.  You pass out.  You have worsening sadness or depression.  If you have any of these symptoms, call your local emergency services (911 in the [place] right away. Do not drive yourself to the hospital.  Yellow zone  These signs and symptoms mean your condition may be getting worse and you should make some changes:  You have trouble breathing when you are active, or you need to sleep with your head raised on extra pillows to help you breathe.  You have swelling  in your legs or abdomen.  You gain 2-3 lb (0.9-1.4 kg) in 24 hours, or 5 lb (2.3 kg) in a week. This amount may be more or less depending on your condition.  You get tired easily.  You have trouble sleeping.  You have a dry cough.  If you have any of these symptoms:  Contact your health care provider within the next day.  Your health care provider may adjust your medicines.  Green zone  These signs mean you are doing well and can continue what you are doing:  You do not have shortness of breath.  You have very little swelling or no new swelling.  Your weight is stable (no gain or loss).  You have a normal activity level.  You do not have chest pain or any other new symptoms.  Follow these instructions at home:  Take over-the-counter and prescription medicines only as told by your health care provider.  Weigh yourself daily. Your target weight is __________ lb (__________ kg).  Call your health care provider if you gain more than __________ lb (__________ kg) in 24 hours, or more than __________ lb (__________ kg) in a week.  Health care provider name: _____________________________________________________  Health care provider phone number: _____________________________________________________  Eat a heart-healthy diet. Work with a diet and nutrition specialist (dietitian) to create an eating plan that is best for you.  Keep all follow-up visits. This is important.  Where to find more information  American Heart Association:  Summary  A heart failure action plan helps you understand what to do when you have symptoms of heart failure.  Follow the action plan that was created by you and your health care provider.  Get help right away if you have any symptoms in the red zone.  This information is not intended to replace advice given to you by your health care provider. Make sure you discuss any questions you have with your health care provider.  Document Revised: 03/28/2023 Document Reviewed: 08/02/2021  Sandra  Patient Education © 2023 [employer]

## 2024-07-05 NOTE — DIETARY
Nutrition Update: Follow-up for PO intake care plan  Day 6 of admit.  Nancy King is a 88 y.o. female with admitting DX of Acute encephalopathy [G93.40]  Acute on chronic HFrEF (heart failure with reduced ejection fraction) ([facility]) [I50.23].    Current Diet: Regular: BID Ensure Plus strawberry, QD TwoCal. PO intake per ADLs was 0-<25% in the past 2 days, improved to % of breakfast this a.m; intake of Ensure supplements has been % per ADLs. PO intake improving w/ addition of supplements. Per chart notes, pt is to discharge home on hospice with family today.    Malnutrition risk Moderate malnutrition in the context of chronic illness r/t MDD and other disease states as evidenced by mild muscle and mild fat wasting. Dx already on problem list     Problem: Nutritional:  Goal: Achieve adequate nutritional intake  Description: Patient will consume >25-50% of meals and supplements or ~25% of meals w/ ~75% of TID supplements  Outcome: Progressing    RD continues to follow.

## 2024-07-05 NOTE — PROGRESS NOTES
Patient to be discharged today - patient aware and agreeable to plan. D/c instructions reviewed with patient - ?'s/concerns answered. 1 piv removed and no meds to beds.

## 2024-07-05 NOTE — DISCHARGE SUMMARY
Discharge Summary    CHIEF COMPLAINT ON ADMISSION  Chief Complaint   Patient presents with    ALOC     Pt biba [employer] from home, pt family called due to pt having altered level of consciousness, last known well two weeks ago, pt alert and oriented x2, on arrival pt had blood sugar 39, pt was given oral glucose tab 15mg, blood sugar check after 256, pt has no complains, GCS 14, pt is incontinent of urine, on transport pt was given 4mg zofran, blood sugar check again 151, 174/71, HR 59, history of DM, afib on eliquist, hyperlipidemia, hypothyroid       Reason for Admission  EMS     Admission Date  6/29/2024    CODE STATUS  DNAR/DNI    HPI & HOSPITAL COURSE  Nancy King is a 88 y.o. female with HTN, HFpEF, Afib on apixaban, T2DM, CAD s/p NSTEMI who presented 6/29/2024 with AMS prompting family to call EMS. BG was reportedly 39 and she responded well to administration of dextrose. In the ED she is bradycardic.UA negative for UTI. CXR demonstrates Right-sided pleural effusion and vascular congestion consistent with pulmonary edema. CTH no acute process, some microvascular disease and atrophy.  Patient was found acute respiratory failure due to acute on chronic CHF with pleural effusion.  Patient has been treated with IV Lasix. Goals of care discussion with patient and . Patient is A&O x1-2 and unable to make her own decisions. Discussed patients underlying dementia in addition to her chronic medical conditions. Patients  has decided for patient to discharge home on hospice.  Thus patient is discharged to home hospice.    Therefore, she is discharged in guarded and stable condition to hospice.    The patient met 2-midnight criteria for an inpatient stay at the time of discharge.    Discharge Date  7/5/2024    FOLLOW UP ITEMS POST DISCHARGE  Follow-up with hospice    DISCHARGE DIAGNOSES  Principal Problem:    Acute on chronic heart failure with preserved ejection fraction (HCC) (POA: Yes)  Active  Problems:    Coronary artery disease of native artery of native heart with stable angina pectoris (HCC) (POA: Yes)    Primary hypertension (POA: Yes)    Severe episode of recurrent major depressive disorder, without psychotic features (HCC) (POA: Yes)      Overview: H/O    Type 2 diabetes mellitus with hypoglycemia without coma, without long-term current use of insulin (HCC) (POA: Yes)    Paroxysmal atrial fibrillation (HCC) (POA: Yes)    Acute encephalopathy (POA: Yes)    Moderate protein-calorie malnutrition (HCC) (POA: Yes)    Acquired hypothyroidism (POA: Yes)    Pulmonary infiltrates (POA: Yes)    Pleural effusion on right (POA: Yes)    Acute respiratory failure with hypoxia (HCC) (POA: Yes)    Delirium (POA: No)    Advance care planning (POA: Unknown)  Resolved Problems:    Cardiogenic pulmonary edema (HCC) (POA: Unknown)      FOLLOW UP  Future Appointments   Date Time Provider Department Center   8/15/2024 12:45 PM Perla Solis P.A.-C. [facility] None     Dee Slater, F.N.P.  [address] [facility] [id number] [phone]    Follow up  Please call your primary care provider to schedule, recent hospitalization follow up, As needed      MEDICATIONS ON DISCHARGE     Medication List        CONTINUE taking these medications        Instructions   amiodarone 200 MG Tabs  Commonly known as: Cordarone   Take 1 Tablet by mouth every day.  Dose: 200 mg     apixaban 5mg Tabs  Commonly known as: Eliquis   Take 1 Tablet by mouth 2 times a day. Indications: Thromboembolism secondary to Atrial Fibrillation  Dose: 5 mg     furosemide 20 MG Tabs  Commonly known as: Lasix   Take 20 mg by mouth 1 time a day as needed (shortness of breath, cough, leg swelling).  Dose: 20 mg     isosorbide mononitrate SR 60 MG Tb24  Commonly known as: Imdur   Take 1 Tablet by mouth every morning.  Dose: 60 mg     levothyroxine 50 MCG Tabs  Commonly known as: Synthroid   Take 50 mcg by mouth 2 times a day.  Dose: 50 mcg      lisinopril 20 MG Tabs  Commonly known as: Prinivil   Take 1 Tablet by mouth every day.  Dose: 20 mg     metoprolol SR 50 MG Tb24  Commonly known as: Toprol XL   Take 1 Tablet by mouth every day.  Dose: 50 mg     nitroglycerin 0.4 MG Subl  Commonly known as: Nitrostat   Doctor's comments: Requesting 1 year supply  DISSOLVE 1 TABLET UNDER THE TONGUE EVERY 5 MINUTES AS  NEEDED FOR CHEST PAIN. MAX  OF 3 TABLETS IN 15 MINUTES. CALL 911 IF PAIN PERSISTS.     ranolazine 500 MG Tb12  Commonly known as: Ranexa   Take 1 Tablet by mouth 2 times a day.  Dose: 500 mg     rosuvastatin 20 MG Tabs  Commonly known as: Crestor   Take 1 Tablet by mouth every evening.  Dose: 20 mg     SITagliptin 100 MG Tabs  Commonly known as: Januvia   Doctor's comments: Requesting 1 year supply  Take 1 Tablet by mouth every day.  Dose: 100 mg     Tylenol 8 Hour Arthritis Pain 650 MG CR tablet  Generic drug: acetaminophen   Take 1,300 mg by mouth every 6 hours as needed for Moderate Pain.  Dose: 1,300 mg     Vitamin B Complex Tabs   Take 1 Tablet by mouth every morning.  Dose: 1 Tablet              Allergies  Allergies   Allergen Reactions    Sulfa Drugs      Swelling    Levofloxacin Rash     Rash         DIET  Orders Placed This Encounter   Procedures    Diet Order Diet: Regular     Standing Status:   Standing     Number of Occurrences:   1     Order Specific Question:   Diet:     Answer:   Regular [1]       ACTIVITY  As tolerated.  Weight bearing as tolerated    CONSULTATIONS      PROCEDURES      LABORATORY  Lab Results   Component Value Date    SODIUM 135 07/03/2024    POTASSIUM 3.0 (L) 07/03/2024    CHLORIDE 93 (L) 07/03/2024    CO2 29 07/03/2024    GLUCOSE 164 (H) 07/03/2024    BUN 37 (H) 07/03/2024    CREATININE 1.64 (H) 07/03/2024        Lab Results   Component Value Date    WBC 8.1 06/29/2024    HEMOGLOBIN 13.5 06/29/2024    HEMATOCRIT 41.7 06/29/2024    PLATELETCT 211 06/29/2024        Total time of the discharge process exceeds 34  minutes.   no...

## 2024-07-05 NOTE — DISCHARGE PLANNING
Care Transition Team Final Discharge Disposition    Actual Discharge Information  Discharge Disposition: D/T to hospice home (50)    Case Management Discharge Planning    Admission Date: 6/29/2024  GMLOS: 3.9  ALOS: 6    6-Clicks ADL Score: 14  6-Clicks Mobility Score: 10  PT and/or OT Eval ordered: Yes  Post-acute Referrals Ordered: Yes  Post-acute Choice Obtained: No  Has referral(s) been sent to post-acute provider:  Yes      Anticipated Discharge Dispo: Discharge Disposition: D/T to hospice home (50)  Discharge Address: [address] [place] [id number]  Discharge Contact Phone Number: [phone]    DME Needed: Yes    DME Ordered: Yes  [facility].  Referall and clinicals have been sent.    Action(s) Taken: Updated Provider/Nurse on Discharge Plan and Transport Arranged     0830 - TC from pt's son Cory who confirmed he and family will be coming at 1100 to  pt to discharge home.     Escalations Completed: None    Medically Clear: Yes    Next Steps: LMSW will continue to follow pt for any case management needs.    Barriers to Discharge: None    Is the patient up for discharge tomorrow: No  Pt is up for DC today at 1100 with family transportation.

## 2024-07-05 NOTE — CARE PLAN
The patient is Stable - Low risk of patient condition declining or worsening    Shift Goals  Clinical Goals: Maintain skin integrity  Patient Goals: Rest  Family Goals: DEBBIE    Progress made toward(s) clinical / shift goals:    Problem: Nutrition  Goal: Patient's nutritional and fluid intake will be adequate or improve  Outcome: Not Progressing  Note: Patient is still being encouraged to eat more with every meal,  has been continuing to bring in snacks but patient has continued to say she is not hungry. Patient was able to try strawberry ensure plus that she was able to drink 100% of.       Problem: Skin Integrity  Goal: Skin integrity is maintained or improved  Outcome: Progressing  Note: Patient has Q2 turns, TAPs system, SYMONE, and barrier cream in place.

## 2024-07-12 ENCOUNTER — TELEPHONE (OUTPATIENT)
Dept: CARDIOLOGY | Facility: MEDICAL CENTER | Age: 89
End: 2024-07-12
Payer: MEDICARE

## 2024-07-12 DIAGNOSIS — I82.409 ACUTE THROMBOEMBOLISM OF DEEP VEINS OF LOWER EXTREMITY, UNSPECIFIED LATERALITY (HCC): Chronic | ICD-10-CM

## 2024-07-12 DIAGNOSIS — R07.89 OTHER CHEST PAIN: ICD-10-CM

## 2024-07-12 DIAGNOSIS — I21.4 NSTEMI (NON-ST ELEVATED MYOCARDIAL INFARCTION) (HCC): ICD-10-CM

## 2024-07-12 DIAGNOSIS — I25.10 CORONARY ARTERY DISEASE INVOLVING NATIVE CORONARY ARTERY OF NATIVE HEART WITHOUT ANGINA PECTORIS: ICD-10-CM

## 2024-07-12 DIAGNOSIS — E78.5 HYPERLIPOPROTEINEMIA: ICD-10-CM

## 2024-07-12 DIAGNOSIS — E78.5 DYSLIPIDEMIA: ICD-10-CM

## 2024-07-12 DIAGNOSIS — I48.0 PAROXYSMAL ATRIAL FIBRILLATION (HCC): ICD-10-CM

## 2024-07-15 RX ORDER — AMIODARONE HYDROCHLORIDE 200 MG/1
200 TABLET ORAL DAILY
Qty: 90 TABLET | Refills: 3 | Status: SHIPPED | OUTPATIENT
Start: 2024-07-15

## 2024-07-16 RX ORDER — METOPROLOL SUCCINATE 50 MG/1
50 TABLET, EXTENDED RELEASE ORAL DAILY
Qty: 90 TABLET | Refills: 3 | Status: SHIPPED | OUTPATIENT
Start: 2024-07-16

## 2024-07-16 RX ORDER — ROSUVASTATIN CALCIUM 20 MG/1
20 TABLET, COATED ORAL EVERY EVENING
Qty: 90 TABLET | Refills: 3 | Status: SHIPPED | OUTPATIENT
Start: 2024-07-16

## 2024-07-16 RX ORDER — ISOSORBIDE MONONITRATE 60 MG/1
60 TABLET, EXTENDED RELEASE ORAL EVERY MORNING
Qty: 90 TABLET | Refills: 3 | Status: SHIPPED | OUTPATIENT
Start: 2024-07-16

## 2024-07-16 RX ORDER — RANOLAZINE 500 MG/1
500 TABLET, EXTENDED RELEASE ORAL 2 TIMES DAILY
Qty: 180 TABLET | Refills: 3 | Status: SHIPPED | OUTPATIENT
Start: 2024-07-16

## 2024-07-17 RX ORDER — LISINOPRIL 20 MG/1
20 TABLET ORAL DAILY
Qty: 90 TABLET | Refills: 3 | Status: SHIPPED | OUTPATIENT
Start: 2024-07-17

## 2024-07-17 RX ORDER — AMIODARONE HYDROCHLORIDE 200 MG/1
200 TABLET ORAL DAILY
Qty: 10 TABLET | Refills: 0 | Status: SHIPPED | OUTPATIENT
Start: 2024-07-17

## 2024-07-17 RX ORDER — NITROGLYCERIN 0.4 MG/1
TABLET SUBLINGUAL
Qty: 25 TABLET | Refills: 3 | Status: SHIPPED | OUTPATIENT
Start: 2024-07-17

## 2024-07-29 NOTE — DISCHARGE PLANNING
"THA received TC from pt's spouse who reports that DME hospital bed has not been delivered.  LMSW requested DPA to reach out to [employer] to inquire about barriers to DME delivery. Per DPA, [employer] is requesting that DME order comments be placed in a progress note.      1357 - Via [employer], THA messaged dwight Delatorre who placed DME order for hospital bed, requesting that DME comments hopsitalist made be inserted as an addendum to the last progress note.  Dwight Delatorre confirmed \"Ok\" via [employer].      1522 - TC to pt's spouse Will THA King notified Will that efforts are being made to resolve the barriers to DME delivery.  Russ was grateful and appreciative of our efforts.  "

## 2024-07-29 NOTE — DISCHARGE PLANNING
0410  Agency/Facility Name: [facility]  Spoke To: Angle  Outcome: DPA inquired follow up on DME, as Pt advised Pt hasn't gotten DME equipment. Per [facility] advised she has order but was on hold due to Pts insurance needing a verbiage. Stating why Pt will need Dme.   Advised LSW via person     8108  Agency/Facility Name: [facility]  Spoke To: Angle  Outcome: DPA advised [facility] an addendum has been added for Pt DME.  DPA will fax Progress note to [facility]    7866  DPA HARD FAXED REFERRAL :  Agency[facility]Facility Name: [facility]  Fax#: [phone]  Time: 6879

## 2024-07-30 NOTE — DISCHARGE PLANNING
2445  Agency/Facility Name: [facility]  Spoke To: Angélica   Outcome: LONG inquired follow up on DME, kathi Lindsay will ask their medicare referral team if the verbiage DPA faxed will work. Kathi Lindsay will call back with more information.  Advised LSW via person.

## 2024-07-31 NOTE — DISCHARGE PLANNING
1041  Agency/Facility Name: [facility]  Spoke To: Angélica  Outcome: DPA received Ph call from [facility] advising she sent fax for [employer]2 media so Medicare can cover DME.

## 2024-08-15 ENCOUNTER — APPOINTMENT (OUTPATIENT)
Dept: CARDIOLOGY | Facility: MEDICAL CENTER | Age: 89
End: 2024-08-15
Attending: PHYSICIAN ASSISTANT
Payer: MEDICARE

## 2024-09-11 DIAGNOSIS — I25.10 CORONARY ARTERY DISEASE INVOLVING NATIVE CORONARY ARTERY OF NATIVE HEART WITHOUT ANGINA PECTORIS: ICD-10-CM

## 2024-09-11 DIAGNOSIS — R07.89 OTHER CHEST PAIN: ICD-10-CM

## 2024-09-12 RX ORDER — NITROGLYCERIN 0.4 MG/1
TABLET SUBLINGUAL
Qty: 25 TABLET | Refills: 3 | OUTPATIENT
Start: 2024-09-12

## 2024-09-12 NOTE — TELEPHONE ENCOUNTER
Is the patient due for a refill? Yes    Was the patient seen the past year? Yes    Date of last office visit: 04.23.2024    Does the patient have an upcoming appointment?  No    Provider to refill:JA    Does the patient have long term Plus and need 100-day supply? (This applies to ALL medications) Patient does not have SCP